# Patient Record
Sex: FEMALE | Race: WHITE | NOT HISPANIC OR LATINO | Employment: FULL TIME | URBAN - METROPOLITAN AREA
[De-identification: names, ages, dates, MRNs, and addresses within clinical notes are randomized per-mention and may not be internally consistent; named-entity substitution may affect disease eponyms.]

---

## 2018-01-11 NOTE — TELEPHONE ENCOUNTER
APPT INFO    Date /Time: 1/24/18 at 2:15PM   Reason for Appt: Wegener's syndrome   Ref Provider/Clinic: Self   Are there internal records? Yes/No?  IF YES, list clinic names: [facility] - returning pt - last seen 2013   Are there outside records? Yes/No? No   Patient Contact (Y/N) & Call Details: No   Action: Chart reviewed

## 2018-01-24 ENCOUNTER — PRE VISIT (OUTPATIENT)
Dept: OTOLARYNGOLOGY | Facility: CLINIC | Age: 68
End: 2018-01-24

## 2018-01-24 ENCOUNTER — OFFICE VISIT (OUTPATIENT)
Dept: OTOLARYNGOLOGY | Facility: CLINIC | Age: 68
End: 2018-01-24
Payer: COMMERCIAL

## 2018-01-24 VITALS — BODY MASS INDEX: 33.79 KG/M2 | HEIGHT: 61 IN | WEIGHT: 179 LBS

## 2018-01-24 DIAGNOSIS — M31.30 GRANULOMATOSIS WITH POLYANGIITIS (H): Primary | ICD-10-CM

## 2018-01-24 ASSESSMENT — PAIN SCALES - GENERAL: PAINLEVEL: NO PAIN (0)

## 2018-01-24 NOTE — LETTER
2018       RE: Susan Liu  [address] [place] [address]     Dear Colleague,    Thank you for referring your patient, Susan Liu, to the [facility] EAR NOSE AND THROAT at [facility]. Please see a copy of my visit note below.    Otolaryngology Adult Consultation    Patient: Susan Liu   : 1950     Patient presents with:  Consult:   Chief Complaint   Patient presents with     Consult     Perla's Syndrome         Referring Provider: Referred Self   Consulting Physician:  Beti Bhardwaj MD       Assessment/Plan: Vidoe recording obtained, will facilitate scheduling a visit and procedure with Dr. Jordan.     HPI: Susan Liu is a 67 year old female seen today in the Otolaryngology Clinic for a history of Wegener's granulomatosis.  She is s/p previous dilations with steroid injections, last was .  She is now noting progressive SOB, feels she is ready for another procedure.  She has previously seen Dr. Jordan - so we discussed today that we would prepare to have her meet with Dr. Jordan for repeat procedure.    Current Outpatient Prescriptions on File Prior to Visit:  dextromethorphan-guaiFENesin (MUCINEX DM)  MG per 12 hr tablet Take 1 tablet by mouth as needed    calcium carbonate-vitamin D 600-200 MG-UNIT TABS Take by mouth every morning    cetirizine (ZYRTEC) 10 MG tablet Take 10 mg by mouth At Bedtime    folic acid (FOLVITE) 1 MG tablet Take 1 mg by mouth every morning    ipratropium - albuterol 0.5 mg/2.5 mg/3 mL (DUONEB) 0.5-2.5 (3) MG/3ML nebulization Take 1 ampule by nebulization every 6 hours as needed   levothyroxine (SYNTHROID, LEVOTHROID) 50 MCG tablet Take 50 mcg by mouth every morning    methotrexate 2.5 MG tablet Take 15 mg by mouth once a week ON FRIDAY   Multiple Vitamin (DAILY MULTIVITAMIN PO) Take by mouth daily (with lunch)    omeprazole 20 MG tablet Take 40 mg by mouth daily (with lunch)    predniSONE (DELTASONE) 20 MG tablet  Take 2.5 mg by mouth every morning    sulfamethoxazole-trimethoprim (BACTRIM) 400-80 MG per tablet Take 1 tablet by mouth every morning      No current facility-administered medications on file prior to visit.        Allergies   Allergen Reactions     Penicillins Anaphylaxis     Codeine Nausea and Vomiting     Codeine Camsylate Nausea and Vomiting     Tramadol GI Disturbance     Tramadol Hcl        Past Medical History:   Diagnosis Date     Allergic rhinitis      Allergic rhinitis, cause unspecified      Anemia      Anxiety      Balance problem      Bleeding disorder (H)      Chronic sinusitis      Depression      Family history of thyroid problem      Hoarseness      Joint disease      Osteoarthritis      Osteoporosis      Reflux      Sinus problem      Skin disease      Thyroid disease      Tinnitus      Wesley syndrome          Review of Systems  [facility] ROS 1/29/2018   Constitutional Unexplained fatigue   Neurology Dizzy spells, Numbness, Unexplained weakness, Headache   Psychology Frequently feeling depressed or sad, Frequently feeling anxious   Eyes Visual loss   Ears, Nose, Throat Hearing loss, Ringing/noise in ears, Nasal congestion or drainage, Trouble swallowing, Hoarseness, Coughing up blood   Cardiopulmonary Cough, Breathing problems, Wheezing   Gastrointestinal/Genitourinary Heartburn/indigestion   Musculoskeletal Sore or stiff joints, Swollen joints, Swollen legs/feet   Allergy/Immunology Skin changes, Rash   Hematologic Easy bruising   Other Rash        14 point ROS neg other than the symptoms noted above.    Physical Exam:    General assessment:  Head and neck exam grossly normal, she is conversant, speaking in full sentences, but does have slightly audible inspiratory stridor and mild hoarseness.    Laryngoscopy  performed to examine the upper airway for pathology, functional or anatomic abnormality.  Verbal consent is obtained.  Topical anesthetic/decongestant solution is applied per patient  request.  The flexible endoscope was passed into each nasal cavity separately, and advanced to the larynx.  Findings are as follows:   Nasal passages without abnormality.     Nasopharynx:  Clear, no lesions, crusting or inflammation   Base of tongue, vallecula, epiglottis, aryepiglottic folds, false vocal folds without  mucosal lesions, masses or anatomic abnormality.   Glottis with normal movement of vocal folds, normal mucosa and no lesions, subglottic larynx visualized and image stream recording performed.   Pyriform sinuses, post-cricoid area, and posterior pharyngeal wall without lesions         Again, thank you for allowing me to participate in the care of your patient.      Sincerely,    Beti Bhardwaj MD

## 2018-01-24 NOTE — MR AVS SNAPSHOT
After Visit Summary   2018    Susan Liu    MRN: [id number]           Patient Information     Date Of Birth          1950        Visit Information        Provider Department      2018 2:15 PM Beti Bhardwaj MD [facility] Ear Nose and Throat        Today's Diagnoses     Granulomatosis with polyangiitis (H)    -  1      Care Instructions    Plan of care:  Dr Bhardwaj will have Dr Jordan review the video from today and see if she is ok scheduling your surgery and between now and the surgery date, have you come and see Dr Jodran.   Dr Jordan's care coordinator will contact [facility] contact information:  1. To schedule an appointment call [phone], option 1  2. To talk to the Triage RN call [phone], option 3  3. If you need to speak to Deanna or get a message to your doctor on a Friday, call the triage RN  4. DeannaRN: [phone]  5. Surgery scheduling:      Dorinda Borja: [phone]      Coretta Redding: [phone]  6. Fax: [phone]  7. [phone]            Follow-ups after your visit        Your next 10 appointments already scheduled     2018 10:40 AM CST   (Arrive by 10:25 AM)   Return Visit with Marcelina Jordan MD   [facility] Ear Nose and Throat ([facility])    [address] [place] [id number] [phone]           This is a multi-disciplinary care team visit as patients with your type of problem are usually seen by a team of an MD and a Speech-Language Pathologist (who is a specialist in disorders of the voice, throat, and breathing).  Please plan about 2 hours for your visit, which will likely include Laryngeal Function Studies, a Voice/Swallow/Breathing Evaluation, and an Endoscopic Laryngeal Examination to provide a comprehensive evaluation.  Please check with your insurance company to ensure you are covered for these services. - It is important to know that if you are evaluated and/or  treated by both a physician and a speech pathologist during your visit, your billing will reflect the input that you receive from both providers as separate professionals. Although most insurance plans do cover these services, we encourage you to contact your insurance company prior to your visit to determine whether there are any coverage limitations that might affect you financially. - Billing/procedure codes that are frequently associated with visits to our clinic include (but are not limited to) the ones listed below. Most patients will not need all of these items, but it may be useful to ask your insurance company's patient . 79940: Flexible fiberoptic laryngoscopy, 09731: Laryngoscopy; flexible or rigid fiberoptic, with stroboscopy, 62487: Flexible endoscopic evaluation of swallowing, 51158: Laryngeal function aerodynamic evaluation, 27291: Evaluation of Voice and Resonance, 52818: Speech pathology treatment for voice, speech, communication, 17658: Speech pathology treatment for swallowing/oral function for feeding - If you have any concerns or questions, or if you would prefer not to have a speech pathologist involved in your visit, please contact our Clinic Coordinator at [phone], at least 24 hours prior to your appointment.              Who to contact     Please call your clinic at [phone] to:    Ask questions about your health    Make or cancel appointments    Discuss your medicines    Learn about your test results    Speak to your doctor   If you have compliments or concerns about an experience at your clinic, or if you wish to file a complaint, please contact [facility] Patient Relations at [phone] or email us at [email or web address]         Additional Information About Your Visit        [employer] Information     [employer] River is an electronic gateway that provides easy, online access to your medical records. With [employer], you can  "request a clinic appointment, read your test results, renew a prescription or communicate with your care team.     To sign up for [facility] visit the website at [email or web address]   You will be asked to enter the access code listed below, as well as some personal information. Please follow the directions to create your username and password.     Your access code is: [email or web address]  Expires: 4/10/2018  6:30 AM     Your access code will  in 90 days. If you need help or a new code, please contact your [facility] or call [phone] for assistance.        Care EveryWhere ID     This is your Care EveryWhere ID. This could be used by other organizations to access your [facility] medical records  [id number]        Your Vitals Were     Height BMI (Body Mass Index)                1.55 m (5' 1.02\") 33.8 kg/m2           Blood Pressure from Last 3 Encounters:   18 138/89   18 (!) 168/95   13 122/62    Weight from Last 3 Encounters:   18 79.9 kg (176 lb 2.4 oz)   18 80.2 kg (176 lb 12.8 oz)   18 80.3 kg (177 lb)              We Performed the Following     IMAGESTREAM RECORDING ORDER     LARYNGOSCOPY FLEX FIBEROPTIC, DIAGNOSTIC        Primary Care Provider    None Specified       [facility] [address] [facility] [address]        Equal Access to Services     [facility] JESSEE AH: Hadii aad ku hadasho Soomaali, waaxda luqadaha, qaybta kaalmada adeegyada, waxay danielle gary'swati . So [facility] [phone].    ATENCIÓN: Si habla español, tiene a apple disposición servicios gratuitos de asistencia lingüística. Llame al [phone].    We comply with applicable federal civil rights laws and [place] laws. We do not discriminate on the basis of race, color, national origin, age, disability, sex, sexual orientation, or gender identity.            Thank you!     Thank you for choosing [facility] EAR NOSE AND THROAT  for your care. Our " goal is always to provide you with excellent care. Hearing back from our patients is one way we can continue to improve our services. Please take a few minutes to complete the written survey that you may receive in the mail after your visit with us. Thank you!             Your Updated Medication List - Protect others around you: Learn how to safely use, store and throw away your medicines at www.disposemymeds.org.          This list is accurate as of 1/24/18 11:59 PM.  Always use your most recent med list.                   Brand Name Dispense Instructions for use Diagnosis    BACTRIM 400-80 MG per tablet   Generic drug:  sulfamethoxazole-trimethoprim      Take 1 tablet by mouth every morning        calcium carbonate-vitamin D 600-200 MG-UNIT Tabs      Take by mouth every morning        cetirizine 10 MG tablet    zyrTEC     Take 10 mg by mouth At Bedtime        DAILY MULTIVITAMIN PO      Take by mouth daily (with lunch)        dextromethorphan-guaiFENesin  MG per 12 hr tablet    MUCINEX DM     Take 1 tablet by mouth as needed        DUONEB 0.5-2.5 (3) MG/3ML neb solution   Generic drug:  ipratropium - albuterol 0.5 mg/2.5 mg/3 mL      Take 1 ampule by nebulization every 6 hours as needed        folic acid 1 MG tablet    FOLVITE     Take 1 mg by mouth every morning        levothyroxine 50 MCG tablet    SYNTHROID/LEVOTHROID     Take 50 mcg by mouth every morning        methotrexate 2.5 MG tablet CHEMO      Take 15 mg by mouth once a week ON FRIDAY        omeprazole 20 MG tablet      Take 40 mg by mouth daily (with lunch)        predniSONE 20 MG tablet    DELTASONE     Take 2.5 mg by mouth every morning        TRAZODONE HCL PO      Take 50 mg by mouth At Bedtime

## 2018-01-24 NOTE — NURSING NOTE
Chief Complaint   Patient presents with     Consult     Perla's Syndrome        Yudith Poe Medical Assistant

## 2018-01-25 ENCOUNTER — TELEPHONE (OUTPATIENT)
Dept: OTOLARYNGOLOGY | Facility: CLINIC | Age: 68
End: 2018-01-25

## 2018-01-25 NOTE — TELEPHONE ENCOUNTER
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  RN left triage number for follow up.    Suzy KRISHNAMURTHYN, RN  [phone]  [facility]   Head & Neck Surgery   1/25/2018 9:24 AM

## 2018-01-29 ENCOUNTER — OFFICE VISIT (OUTPATIENT)
Dept: OTOLARYNGOLOGY | Facility: CLINIC | Age: 68
End: 2018-01-29
Payer: COMMERCIAL

## 2018-01-29 VITALS — WEIGHT: 177 LBS | BODY MASS INDEX: 33.42 KG/M2 | HEIGHT: 61 IN

## 2018-01-29 DIAGNOSIS — J38.6 SUBGLOTTIC STENOSIS: Primary | ICD-10-CM

## 2018-01-29 DIAGNOSIS — M31.30 WEGENER'S GRANULOMATOSIS: ICD-10-CM

## 2018-01-29 ASSESSMENT — PAIN SCALES - GENERAL: PAINLEVEL: NO PAIN (0)

## 2018-01-29 NOTE — PROGRESS NOTES
Dear Colleague:    I had the pleasure of meeting Susan Liu in consultation at the [facility] of the [facility] Otolaryngology Clinic on a self-referred basis, for evaluation of breathing problems. The note from our visit follows.  I appreciate the opportunity to participate in the care of this pleasant patient.    Please feel free to contact me with any questions.    Sincerely yours,    Marcelina Jordan M.D., M.P.H.  , Laryngology  Director, [facility]  Otolaryngology- Head & Neck Surgery  [phone]          =====    History of Present Illness:   Susan Liu is a pleasant 67-year-old female with a medical history including atrial fibrillation, Wegeners, and subglottic stenosis who presents with a three month history of breathing problems. She underwent a Microdirect laryngoscopy with incision, dilation, steroid injection of subglottic stenosis in 2013 and did well for years.       Voice  Her voice has been stable since she was last seen.      Swallowing  She feels like her swallowing has gotten a little weaker over time. She describes herself as a slow eater all her life. Things get down the wrong tube occasionally, mostly if she is trying to rush. She does experience mildly increased swallowing effort with all textures.      Cough/Throat-clearing  She notes that she often has a cough and it is typically exacerbated by spring/fall environmental allergens.      Breathing  Her daughter notes that the patient was wheezy in November, and then her breathing got a little better when it got cold. She is able to walk about 50 feet before she needs to stop to rest. She also has joint pain in her lower extremities which compounds her difficulty.      Throat discomfort  No concerns.       Reflux-type symptoms  She experiences heartburn/indigestion daily. She is taking reflux medications.      Prior EPIC records were reviewed for this visit.  She continues to follow with her rheumatologist Dr. Abilio Greene.      MEDICATIONS:     Current Outpatient Prescriptions   Medication Sig Dispense Refill     TRAZODONE HCL PO        HYDROcodone-acetaminophen 5-325 MG per tablet Take 1-2 tablets by mouth every 4 hours as needed for other (Moderate to Severe Pain) 20 tablet 0     dextromethorphan-guaiFENesin (MUCINEX DM)  MG per 12 hr tablet Take 1 tablet by mouth every 12 hours       albuterol (2.5 MG/3ML) 0.083% nebulizer solution Take 1 ampule by nebulization every 6 hours as needed       calcium carbonate-vitamin D 600-200 MG-UNIT TABS Take by mouth daily       cetirizine (ZYRTEC) 10 MG tablet Take 10 mg by mouth daily       folic acid (FOLVITE) 1 MG tablet Take 1 mg by mouth daily       ipratropium - albuterol 0.5 mg/2.5 mg/3 mL (DUONEB) 0.5-2.5 (3) MG/3ML nebulization Take 1 ampule by nebulization every 6 hours as needed       levothyroxine (SYNTHROID, LEVOTHROID) 50 MCG tablet Take by mouth daily       meloxicam (MOBIC) 15 MG tablet Take 15 mg by mouth daily       methotrexate 2.5 MG tablet Take by mouth once a week       Multiple Vitamin (DAILY MULTIVITAMIN PO) Take by mouth daily       omeprazole 20 MG tablet Take 40 mg by mouth daily        predniSONE (DELTASONE) 20 MG tablet Take by mouth daily       sulfamethoxazole-trimethoprim (BACTRIM) 400-80 MG per tablet Take 1 tablet by mouth 2 times daily         ALLERGIES:    Allergies   Allergen Reactions     Penicillins Anaphylaxis     Codeine Nausea and Vomiting     Codeine Camsylate Nausea and Vomiting     Tramadol GI Disturbance     Tramadol Hcl        PAST MEDICAL HISTORY:   Past Medical History:   Diagnosis Date     Allergic rhinitis      Allergic rhinitis, cause unspecified      Anemia      Anxiety      Atrial fibrillation (H)      Balance problem      Bleeding disorder (H)      Chronic sinusitis      Depression      Family history of thyroid problem      Hoarseness      Joint disease       Osteoarthritis      Osteoporosis      Pneumonia      Reflux      Sinus problem      Skin disease      Thyroid disease      Tinnitus         PAST SURGICAL HISTORY:   Past Surgical History:   Procedure Laterality Date     BUNIONECTOMY      both feet      SECTION      three     dilation for subglotic stenosis[       INJECT STEROID (LOCATION)  2013    Procedure: INJECT STEROID (LOCATION);;  Surgeon: Marcelina Jordan MD;  Location: [facility] OR     LASER CO2 LARYNGOSCOPY, COMPLEX  2013    Procedure: LASER CO2 LARYNGOSCOPY, COMPLEX;  Co2 Lumenis Laser Micro Direct Laryngoscopy, Incision and Dilation Subglottic stenosis, Steroid Injection, Mitomycin application. ;  Surgeon: Marcelina Jordan MD;  Location: [facility] OR       HABITS/SOCIAL HISTORY:    Social History   Substance Use Topics     Smoking status: Never Smoker     Smokeless tobacco: Never Used     Alcohol use Yes      Comment: 2 glasses wine a day         FAMILY HISTORY:    Family History   Problem Relation Age of Onset     Hypertension Mother      Dementia Mother      Depression Mother      Thyroid Disease Mother      Migraines Mother      Hypertension Father      Alcohol/Drug Father      Depression Father      HEART DISEASE Brother      CANCER Brother      Thyroid Disease Daughter      Migraines Daughter      2 daughters       REVIEW OF SYSTEMS:  The patient completed a comprehensive 11 point review of systems (below), which was reviewed. Positives are as noted below; pertinent findings are as noted in the HPI.     Patient Supplied Answers to Review of Systems   ENT ROS 2018   Constitutional Unexplained fatigue   Neurology Dizzy spells, Numbness, Unexplained weakness, Headache   Psychology Frequently feeling depressed or sad, Frequently feeling anxious   Eyes Visual loss   Ears, Nose, Throat Hearing loss, Ringing/noise in ears, Nasal congestion or drainage, Trouble swallowing, Hoarseness, Coughing up blood   Cardiopulmonary Cough,  Breathing problems, Wheezing   Gastrointestinal/Genitourinary Heartburn/indigestion   Musculoskeletal Sore or stiff joints, Swollen joints, Swollen legs/feet   Allergy/Immunology Skin changes, Rash   Hematologic Easy bruising   Other Rash       PHYSICAL EXAMINATION:  General: The patient was alert and conversant, and in no acute distress. Patient accompanied by her daughter.  Eyes: PERRL, conjunctiva and lids normal, sclera nonicteric.  Nose: Anterior rhinoscopy: no gross abnormalities. no  bleeding; no  mucopurulence; septum grossly normal, moderate  mucoid drainage and/or crusting.  Oral cavity/oropharynx: No masses or lesions. Edentulous. Tongue mobility and palate elevation intact and symmetric.  Ears: Normal auricles, external auditory canals bilaterally. Visualized portions of tympanic membranes normal bilaterally. Moderate cerumen noted bilaterally.  Neck: No palpable cervical lymphadenopathy. There was moderate  tenderness to palpation of the thyrohyoid space, which was narrow. No obvious thyroid abnormality. Landmarks palpable.  Resp: Breathing comfortably at rest; audible inhalation.  Neuro: Symmetric facial function. Other cranial nerves as documented above.  Psych: Normal affect, pleasant and cooperative.  Voice/speech: Mild to moderate dysphonia characterized by breathiness, roughness and strain.  Extremities: No cyanosis, clubbing, or edema of the upper extremities.    Intake scores  Total Score for Last Patient-Answered VHI Questionnaire  VHI Total Score 1/29/2018   VHI Total Score 18     Total Score for Last Patient-Answered EAT Questionnaire  EAT Total Score 1/29/2018   EAT Total Score 9     Total Score for Last Patient-Answered CSI Questionnaire  CSI Total Score 1/29/2018   CSI Total Score 18       PROCEDURE:   Flexible fiberoptic laryngoscopy  Indications: This procedure was warranted to evaluate the patient's laryngeal anatomy and function. Risks, benefits, and alternatives were  discussed.  Description: After written informed consent was obtained, a time-out was performed to confirm patient identity, procedure, and procedure site. Topical 3% lidocaine with 0.25% phenylephrine was applied to the nasal cavities. I performed the endoscopy and no complications were apparent.  Performed by: Marcelina Jordan MD MPH  SLP: NA  Findings: Normal nasopharynx. Normal base of tongue, valleculae, and epiglottis. The right true vocal fold demonstrated normal mobility. The left true vocal fold demonstrated normal mobility. The medial edges of the vocal folds appeared smooth and straight. No focal mucosal lesions were observed on the true vocal folds. Glissade produced appropriate elongation. Mucosa of the false vocal folds, aryepiglottic folds, piriform sinuses, and posterior glottis unremarkable. Subglottic airway with visible inflammation beginning just below true vocal folds, moderately narrowed. Normal tracheal rings partially visible distal to stenotic region.           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     We discussed potential means of managing airway stenosis, including open resection, and endoscopic incision and dilation. She was more interested in the endoscopic approach, having undergone it previously with both Dr. Penny and myself. Risks of surgery, including injury to lips/ teeth/ tongue/ jaw/ throat/ airway/lungs, risks of general anesthesia, and temporary/permanent hoarseness were discussed. This included temporary or permanent dysgeusia, hypesthesia, or motion abnormalities of the tongue. We also discussed a potential need for additional procedures in the future, particularly if the stenosis recurs, which is relatively common. We discussed that tracheotomy is rarely necessary, and the airway would be managed with jet ventilation, which does carry a small risk of airway/lung injury. She was comfortable with  steroid injection into the stenosis. She would also like to receive mitomycin again. We discussed that the procedure will be outpatient with a low threshold for overnight observation if there are any concerns, since it is an airway procedure.    After hearing the risks and benefits, she indicated that she would like to proceed with endoscopic surgery. We will get this scheduled over the next few weeks. I appreciate the opportunity to participate in the care of this patient.

## 2018-01-29 NOTE — LETTER
1/29/2018      RE: Susan Liu  [address] [place] [address]       Dear Colleague:    I had the pleasure of meeting Susan Liu in consultation at the [facility] of the [facility] on a self-referred basis, for evaluation of breathing problems. The note from our visit follows.  I appreciate the opportunity to participate in the care of this pleasant patient.    Please feel free to contact me with any questions.    Sincerely yours,    Marcelina Jordan M.D., M.P.H.  , Laryngology  Director, [facility]  Otolaryngology- Head & Neck Surgery  [phone]          =====    History of Present Illness:   Susan Liu is a pleasant 67 year old female with a medical history including atrial fibrillation, Wegeners, and subglottic stenosis who presents with a 3 month history of breathing problems. She underwent a Microdirect laryngoscopy with incision, dilation, steroid injection of subglottic stenosis in 2013 and did well for years.     Voice  Her voice has been stable since she was last seen.    Swallowing  She feels like her swallowing has gotten a little weaker over time; she describes herself as a slow eater all her life. Things get down the wrong tube occasionally, mostly if she is trying to rush. She does experience mildly increased swallowing effort with all textures.    Cough/Throat-clearing  She notes that she often has a cough and it is typically exacerbated by spring/fall environmental allergens.    Breathing  Her daughter notes that the patient was wheezy in November, and then her breathing got a little better when it got cold. She is able to go about 50 ft before she needs to stop to rest; she also has joint pain in her lower extremities which compounds her difficulty.    Throat discomfort  No concerns.     Reflux-type symptoms  She experiences heartburn/indigestion daily. She is taking reflux  medications.    Prior [facility] records were reviewed for this visit. She continues to follow with her rheumatologist Dr. Abilio Greene.      MEDICATIONS:     Current Outpatient Prescriptions   Medication Sig Dispense Refill     TRAZODONE HCL PO        HYDROcodone-acetaminophen 5-325 MG per tablet Take 1-2 tablets by mouth every 4 hours as needed for other (Moderate to Severe Pain) 20 tablet 0     dextromethorphan-guaiFENesin (MUCINEX DM)  MG per 12 hr tablet Take 1 tablet by mouth every 12 hours       albuterol (2.5 MG/3ML) 0.083% nebulizer solution Take 1 ampule by nebulization every 6 hours as needed       calcium carbonate-vitamin D 600-200 MG-UNIT TABS Take by mouth daily       cetirizine (ZYRTEC) 10 MG tablet Take 10 mg by mouth daily       folic acid (FOLVITE) 1 MG tablet Take 1 mg by mouth daily       ipratropium - albuterol 0.5 mg/2.5 mg/3 mL (DUONEB) 0.5-2.5 (3) MG/3ML nebulization Take 1 ampule by nebulization every 6 hours as needed       levothyroxine (SYNTHROID, LEVOTHROID) 50 MCG tablet Take by mouth daily       meloxicam (MOBIC) 15 MG tablet Take 15 mg by mouth daily       methotrexate 2.5 MG tablet Take by mouth once a week       Multiple Vitamin (DAILY MULTIVITAMIN PO) Take by mouth daily       omeprazole 20 MG tablet Take 40 mg by mouth daily        predniSONE (DELTASONE) 20 MG tablet Take by mouth daily       sulfamethoxazole-trimethoprim (BACTRIM) 400-80 MG per tablet Take 1 tablet by mouth 2 times daily         ALLERGIES:    Allergies   Allergen Reactions     Penicillins Anaphylaxis     Codeine Nausea and Vomiting     Codeine Camsylate Nausea and Vomiting     Tramadol GI Disturbance     Tramadol Hcl        PAST MEDICAL HISTORY:   Past Medical History:   Diagnosis Date     Allergic rhinitis      Allergic rhinitis, cause unspecified      Anemia      Anxiety      Atrial fibrillation (H)      Balance problem      Bleeding disorder (H)      Chronic sinusitis      Depression      Family history of  thyroid problem      Hoarseness      Joint disease      Osteoarthritis      Osteoporosis      Pneumonia      Reflux      Sinus problem      Skin disease      Thyroid disease      Tinnitus         PAST SURGICAL HISTORY:   Past Surgical History:   Procedure Laterality Date     BUNIONECTOMY      both feet      SECTION      three     dilation for subglotic stenosis[       INJECT STEROID (LOCATION)  2013    Procedure: INJECT STEROID (LOCATION);;  Surgeon: Marcelina Jordan MD;  Location: [facility] OR     LASER CO2 LARYNGOSCOPY, COMPLEX  2013    Procedure: LASER CO2 LARYNGOSCOPY, COMPLEX;  Co2 Lumenis Laser Micro Direct Laryngoscopy, Incision and Dilation Subglottic stenosis, Steroid Injection, Mitomycin application. ;  Surgeon: Marcelina Jordan MD;  Location: [facility] OR       HABITS/SOCIAL HISTORY:    Social History   Substance Use Topics     Smoking status: Never Smoker     Smokeless tobacco: Never Used     Alcohol use Yes      Comment: 2 glasses wine a day         FAMILY HISTORY:    Family History   Problem Relation Age of Onset     Hypertension Mother      Dementia Mother      Depression Mother      Thyroid Disease Mother      Migraines Mother      Hypertension Father      Alcohol/Drug Father      Depression Father      HEART DISEASE Brother      CANCER Brother      Thyroid Disease Daughter      Migraines Daughter      2 daughters       REVIEW OF SYSTEMS:  The patient completed a comprehensive 11 point review of systems (below), which was reviewed. Positives are as noted below; pertinent findings are as noted in the HPI.     Patient Supplied Answers to Review of Systems  [facility] ENT ROS 2018   Constitutional Unexplained fatigue   Neurology Dizzy spells, Numbness, Unexplained weakness, Headache   Psychology Frequently feeling depressed or sad, Frequently feeling anxious   Eyes Visual loss   Ears, Nose, Throat Hearing loss, Ringing/noise in ears, Nasal congestion or drainage, Trouble swallowing,  Hoarseness, Coughing up blood   Cardiopulmonary Cough, Breathing problems, Wheezing   Gastrointestinal/Genitourinary Heartburn/indigestion   Musculoskeletal Sore or stiff joints, Swollen joints, Swollen legs/feet   Allergy/Immunology Skin changes, Rash   Hematologic Easy bruising   Other Rash       PHYSICAL EXAMINATION:  General: The patient was alert and conversant, and in no acute distress. Patient accompanied by her daughter.  Eyes: PERRL, conjunctiva and lids normal, sclera nonicteric.  Nose: Anterior rhinoscopy: no gross abnormalities. no  bleeding; no  mucopurulence; septum grossly normal, moderate  mucoid drainage and/or crusting.  Oral cavity/oropharynx: No masses or lesions. Edentulous. Tongue mobility and palate elevation intact and symmetric.  Ears: Normal auricles, external auditory canals bilaterally. Visualized portions of tympanic membranes normal bilaterally. Moderate cerumen noted bilaterally.  Neck: No palpable cervical lymphadenopathy. There was moderate  tenderness to palpation of the thyrohyoid space, which was narrow. No obvious thyroid abnormality. Landmarks palpable.  Resp: Breathing comfortably at rest; audible inhalation.  Neuro: Symmetric facial function. Other cranial nerves as documented above.  Psych: Normal affect, pleasant and cooperative.  Voice/speech: Mild to moderate dysphonia characterized by breathiness, roughness and strain.  Extremities: No cyanosis, clubbing, or edema of the upper extremities.    Intake scores  Total Score for Last Patient-Answered VHI Questionnaire  VHI Total Score 1/29/2018   VHI Total Score 18     Total Score for Last Patient-Answered EAT Questionnaire  EAT Total Score 1/29/2018   EAT Total Score 9     Total Score for Last Patient-Answered CSI Questionnaire  CSI Total Score 1/29/2018   CSI Total Score 18       PROCEDURE:   Flexible fiberoptic laryngoscopy  Indications: This procedure was warranted to evaluate the patient's laryngeal anatomy and function.  Risks, benefits, and alternatives were discussed.  Description: After written informed consent was obtained, a time-out was performed to confirm patient identity, procedure, and procedure site. Topical 3% lidocaine with 0.25% phenylephrine was applied to the nasal cavities. I performed the endoscopy and no complications were apparent.  Performed by: Marcelina Jordan MD MPH  SLP: NA  Findings: Normal nasopharynx. Normal base of tongue, valleculae, and epiglottis. The right true vocal fold demonstrated normal mobility. The left true vocal fold demonstrated normal mobility. The medial edges of the vocal folds appeared smooth and straight. No focal mucosal lesions were observed on the true vocal folds. Glissade produced appropriate elongation. Mucosa of the false vocal folds, aryepiglottic folds, piriform sinuses, and posterior glottis unremarkable. Subglottic airway with visible inflammation beginning just below true vocal folds, moderately narrowed. Normal tracheal rings partially visible distal to stenotic region.           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 We discussed potential means of managing airway stenosis, including open resection, and endoscopic incision and dilation. She was more interested in the endoscopic approach, having undergone it previously with both Dr Penny and myself. Risks of surgery, including injury to lips/ teeth/ tongue/ jaw/ throat/ airway/lungs, risks of general anesthesia, and temporary/permanent hoarseness were discussed. This included temporary or permanent dysgeusia, hypesthesia, or motion abnormalities of the tongue. We also discussed a potential need for additional procedures in the future, particularly if the stenosis recurs, which is relatively common. We discussed that tracheotomy is rarely necessary, and the airway would be managed with jet ventilation, which does carry a small risk of airway/lung  injury. She was comfortable with steroid injection into the stenosis. She would also like to receive mitomycin again. We discussed that the procedure will be outpatient with a low threshold for overnight observation if there are any concerns, since it is an airway procedure.    After hearing the risks and benefits, she indicated that she would like to proceed with endoscopic surgery. We will get this scheduled over the next few weeks. I appreciate the opportunity to participate in the care of this patient.             Marcelina Jordan MD

## 2018-01-29 NOTE — NURSING NOTE
"Chief Complaint   Patient presents with     RECHECK     Follow up for subglotic stenosis      Height 1.549 m (5' 1\"), weight 80.3 kg (177 lb).    Nelson Abreu    "

## 2018-01-29 NOTE — MR AVS SNAPSHOT
After Visit Summary   1/29/2018    Susan Liu    MRN: [id number]           Patient Information     Date Of Birth          1950        Visit Information        Provider Department      1/29/2018 10:40 AM Marcelina Jordan MD [facility] Ear Nose and Throat        Today's Diagnoses     Subglottic stenosis    -  1    Wegener's granulomatosis (H)          Care Instructions    1.  You were seen in the ENT Clinic today by Dr. Jordan.  If you have any questions or concerns after your appointment, please call [phone].  Press option #1 for scheduling related needs.  Press option #3 for Nurse advice.  2.  Plan is to schedule surgery for dilation.      Suzy POLK, RN  [facility] ENT   Head & Neck Surgery               Follow-ups after your visit        Additional Services     PAC Visit Referral (For [facility] Only)       Does this visit require an Anesthesia consult?  Yes - Evaluate for medical necessity related to one of the following conditions:  Hx Wegener's granulomatosis     H&P done by:  PAC      Please be aware that coverage of these services is subject to the terms and limitations of your health insurance plan.  Call member services at your health plan with any benefit or coverage questions.      Please bring the following to your appointment:  >>   Any x-rays, CTs or MRIs which have been performed.  Contact the facility where they were done to arrange for  prior to your scheduled appointment.  Any new CT, MRI or other procedures ordered by your specialist must be performed at a [place] facility or coordinated by your clinic's referral office.    >>   List of current medications  >>   This referral request   >>   Any documents/labs given to you for this referral                  Your next 10 appointments already scheduled     Feb 01, 2018   Procedure with Marcelina Jordan MD   [facility], [place], Same Day Surgery (--)    [address] [address]    [phone]            Feb 26, 2018 10:40 AM CST   (Arrive by 10:25 AM)   Return Visit with Marcelina Jordan MD   [facility] Ear Nose and Throat ([facility] [place])    [address] [id number] [phone]           This is a multi-disciplinary care team visit as patients with your type of problem are usually seen by a team of an MD and a Speech-Language Pathologist (who is a specialist in disorders of the voice, throat, and breathing).  Please plan about 2 hours for your visit, which will likely include Laryngeal Function Studies, a Voice/Swallow/Breathing Evaluation, and an Endoscopic Laryngeal Examination to provide a comprehensive evaluation.  Please check with your insurance company to ensure you are covered for these services. - It is important to know that if you are evaluated and/or treated by both a physician and a speech pathologist during your visit, your billing will reflect the input that you receive from both providers as separate professionals. Although most insurance plans do cover these services, we encourage you to contact your insurance company prior to your visit to determine whether there are any coverage limitations that might affect you financially. - Billing/procedure codes that are frequently associated with visits to our clinic include (but are not limited to) the ones listed below. Most patients will not need all of these items, but it may be useful to ask your insurance company's patient . 04762: Flexible fiberoptic laryngoscopy, 30400: Laryngoscopy; flexible or rigid fiberoptic, with stroboscopy, 00459: Flexible endoscopic evaluation of swallowing, 03067: Laryngeal function aerodynamic evaluation, 12144: Evaluation of Voice and Resonance, 47476: Speech pathology treatment for voice, speech, communication, 88590: Speech pathology treatment for swallowing/oral function for feeding - If you have any  "concerns or questions, or if you would prefer not to have a speech pathologist involved in your visit, please contact our Clinic Coordinator at [phone], at least 24 hours prior to your appointment.              Who to contact     Please call your clinic at [phone] to:    Ask questions about your health    Make or cancel appointments    Discuss your medicines    Learn about your test results    Speak to your doctor   If you have compliments or concerns about an experience at your clinic, or if you wish to file a complaint, please contact [facility] Patient Relations at [phone] or email us at [email or web address]         Additional Information About Your Visit        [employer] Information     MTPV is an electronic gateway that provides easy, online access to your medical records. With MTPV, you can request a clinic appointment, read your test results, renew a prescription or communicate with your care team.     To sign up for MTPV visit the website at [email or web address]   You will be asked to enter the access code listed below, as well as some personal information. Please follow the directions to create your username and password.     Your access code is: [email or web address]  Expires: 4/10/2018  6:30 AM     Your access code will  in 90 days. If you need help or a new code, please contact your [facility] Clinic or call [phone] for assistance.        Care EveryWhere ID     This is your Care EveryWhere ID. This could be used by other organizations to access your [place] medical records  [id number]        Your Vitals Were     Height BMI (Body Mass Index)                1.549 m (5' 1\") 33.44 kg/m2           Blood Pressure from Last 3 Encounters:   18 (!) 168/95   13 122/62    Weight from Last 3 Encounters:   18 80.2 kg (176 lb 12.8 oz)   18 80.3 kg (177 lb)   18 81.2 kg (179 lb)            "   We Performed the Following     IMAGESTREAM RECORDING ORDER     LARYNGOSCOPY FLEX FIBEROPTIC, DIAGNOSTIC     PAC Visit Referral (For [facility] Only)     Molly-Operative Worksheet        Primary Care Provider Office Phone # Fax #    Dea Tolbert [phone]       [facility] [address]  [place] [address]        Equal Access to Services     [facility] : Hadii aad ku hadasho Soomaali, waaxda luqadaha, qaybta kaalmada adeegyada, waxay idiin hayaan adeeg kharash la'aan ah. So [facility] [phone].    ATENCIÓN: Si habla español, tiene a apple disposición servicios gratuitos de asistencia lingüística. Pierce al [phone].    We comply with applicable federal civil rights laws and [place] laws. We do not discriminate on the basis of race, color, national origin, age, disability, sex, sexual orientation, or gender identity.            Thank you!     Thank you for choosing [facility] EAR NOSE AND THROAT  for your care. Our goal is always to provide you with excellent care. Hearing back from our patients is one way we can continue to improve our services. Please take a few minutes to complete the written survey that you may receive in the mail after your visit with us. Thank you!             Your Updated Medication List - Protect others around you: Learn how to safely use, store and throw away your medicines at www.disposemymeds.org.          This list is accurate as of 1/29/18 11:59 PM.  Always use your most recent med list.                   Brand Name Dispense Instructions for use Diagnosis    BACTRIM 400-80 MG per tablet   Generic drug:  sulfamethoxazole-trimethoprim      Take 1 tablet by mouth every morning        calcium carbonate-vitamin D 600-200 MG-UNIT Tabs      Take by mouth every morning        cetirizine 10 MG tablet    zyrTEC     Take 10 mg by mouth At Bedtime        DAILY MULTIVITAMIN PO      Take by mouth daily (with lunch)        dextromethorphan-guaiFENesin  MG per 12 hr  tablet    MUCINEX DM     Take 1 tablet by mouth as needed        DUONEB 0.5-2.5 (3) MG/3ML neb solution   Generic drug:  ipratropium - albuterol 0.5 mg/2.5 mg/3 mL      Take 1 ampule by nebulization every 6 hours as needed        folic acid 1 MG tablet    FOLVITE     Take 1 mg by mouth every morning        levothyroxine 50 MCG tablet    SYNTHROID/LEVOTHROID     Take 50 mcg by mouth every morning        methotrexate 2.5 MG tablet CHEMO      Take 15 mg by mouth once a week ON FRIDAY        omeprazole 20 MG tablet      Take 40 mg by mouth daily (with lunch)        predniSONE 20 MG tablet    DELTASONE     Take 2.5 mg by mouth every morning        TRAZODONE HCL PO      Take 50 mg by mouth At Bedtime

## 2018-01-29 NOTE — PATIENT INSTRUCTIONS
1.  You were seen in the ENT Clinic today by Dr. Jordan.  If you have any questions or concerns after your appointment, please call [phone].  Press option #1 for scheduling related needs.  Press option #3 for Nurse advice.  2.  Plan is to schedule surgery for dilation.      Suzy KRISHNAMURTHYN, RN  [facility] ENT   Head & Neck Surgery

## 2018-01-30 ENCOUNTER — ANESTHESIA EVENT (OUTPATIENT)
Dept: SURGERY | Facility: CLINIC | Age: 68
End: 2018-01-30
Payer: MEDICARE

## 2018-01-31 ENCOUNTER — ALLIED HEALTH/NURSE VISIT (OUTPATIENT)
Dept: SURGERY | Facility: CLINIC | Age: 68
End: 2018-01-31
Payer: COMMERCIAL

## 2018-01-31 ENCOUNTER — APPOINTMENT (OUTPATIENT)
Dept: SURGERY | Facility: CLINIC | Age: 68
End: 2018-01-31
Payer: COMMERCIAL

## 2018-01-31 ENCOUNTER — OFFICE VISIT (OUTPATIENT)
Dept: SURGERY | Facility: CLINIC | Age: 68
End: 2018-01-31
Payer: COMMERCIAL

## 2018-01-31 VITALS
HEART RATE: 91 BPM | SYSTOLIC BLOOD PRESSURE: 168 MMHG | TEMPERATURE: 98.2 F | HEIGHT: 61 IN | WEIGHT: 176.8 LBS | RESPIRATION RATE: 18 BRPM | OXYGEN SATURATION: 98 % | BODY MASS INDEX: 33.38 KG/M2 | DIASTOLIC BLOOD PRESSURE: 95 MMHG

## 2018-01-31 DIAGNOSIS — J38.6 SUBGLOTTIC STENOSIS: ICD-10-CM

## 2018-01-31 DIAGNOSIS — Z01.818 PRE-OP EXAM: Primary | ICD-10-CM

## 2018-01-31 RX ORDER — PHENOL 1.4 %
10 AEROSOL, SPRAY (ML) MUCOUS MEMBRANE AT BEDTIME
COMMUNITY

## 2018-01-31 RX ORDER — CALCIUM CARBONATE 300MG(750)
400 TABLET,CHEWABLE ORAL AT BEDTIME
COMMUNITY

## 2018-01-31 ASSESSMENT — ENCOUNTER SYMPTOMS: DYSRHYTHMIAS: 1

## 2018-01-31 NOTE — PATIENT INSTRUCTIONS
Preparing for Your Surgery      Name:  Susan Liu   MRN:  [id number]   :  1950   Today's Date:  2018     Arriving for surgery:  Surgery date:  18  Arrival time:  09:10 a.m.  Please come to:       [facility] [address]  [address] [place]  [address]    -   parking is available in front of the hospital from 5:15 am to 8:00 pm    -  Stop at the Information Desk in the lobby    -   Inform the information person that you are here for surgery. An escort to 3c will be provided. If you would not like an escort, please proceed to 3C on the 3rd floor. [phone]     What can I eat or drink?  -  You may have solid food or milk products until 8 hours prior to your surgery  midnight  -  You may have water, apple juice or 7up/Sprite until 2 hours prior to your surgery  09:10 a.m.    Which medicines can I take?  -  Do NOT take these medications in the morning, the day of surgery:     Calcium, folic acid, Meloxicam, methotrexate, multivitamin, evening primrose, magnesium    -  Please take these medications the day of surgery:      Mucinex as needed, Zyrtec, Omeprazole, prednisone, bactrim, synthroid  AFTER YOUR SURGERY  Breathing exercises   Breathing exercises help you recover faster. Take deep breaths and let the air out slowly. This will:     Help you wake up after surgery.    Help prevent complications like pneumonia.  Preventing complications will help you go home sooner.   We may give you a breathing device (incentive spirometer) to encourage you to breathe deeply.   Nausea and vomiting   You may feel sick to your stomach after surgery; if so, let your nurse know.    Pain control:  After surgery, you may have pain. Our goal is to help you manage your pain. Pain medicine will help you feel comfortable enough to do activities that will help you heal.  These activities may include breathing exercises, walking and physical therapy.   To help your health care  team treat your pain we will ask: 1) If you have pain  2) where it is located 3) describe your pain in your words  Methods of pain control include medications given by mouth, vein or by nerve block for some surgeries.  We may give you a pain control pump that will:  1) Deliver the medicine through a tube placed in your vein  2) Control the amount of medicine you receive  3) Allow you to push a button to deliver a dose of pain medicine  Sequential Compression Device (SCD) or Pneumo Boots:  You may need to wear SCD S on your legs or feet. These are wraps connected to a machine that pumps in air and releases it. The repeated pumping helps prevent blood clots from forming.     How do I prepare myself?  -  Take two showers: one the night before surgery; and one the morning of surgery.         Use Scrubcare or Hibiclens to wash from neck down.  You may use your own shampoo and conditioner. No other hair products.   -  Do NOT use lotion, powder, deodorant, or antiperspirant the day of your surgery.  -  Do NOT wear any makeup, fingernail polish or jewelry.  -Do not bring your own medications to the hospital, except for inhalers and eye drops.  -  Bring your ID and insurance card.    Questions or Concerns:  If you have questions or concerns, please call the  Preoperative Assessment Center, Monday-Friday 7AM-7PM:  [phone]

## 2018-01-31 NOTE — Clinical Note
Dr Jordan,  I had the pleasure of seeing your patient in the PAC. Please find the attached H&P.  If you have any questions or concerns I can be reached in the PAC.  Best Regards,  MARIANO Salazar  [facility] Preoperative Assessment Center Phone: [phone] Fax: [phone]

## 2018-01-31 NOTE — H&P
Pre-Operative H & P     CC:  Preoperative exam to assess for increased cardiopulmonary risk while undergoing surgery and anesthesia.    Date of Encounter: 2018  Primary Care Physician:  Dea Tolbert  Susan Liu is a 67 year old female who presents for pre-operative H & P in preparation for Microdirect Laryngoscopy With Incision/Dilation Of Subglottic Stenosis Possible Steroid Injection, Possible Mitomycin Application With CO2 Laser   with Dr. Jordan on 18 at [facility].  She has a history of subglottic stenosis and had her last laryngoscopy in .  She noticed increased difficulty with breathing approximately 3 months ago.  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      She denies any other acute symptoms of cough, congestion, sore throat, or fever/chills.     History is obtained from the patient.     Past Medical History  Past Medical History:   Diagnosis Date     Allergic rhinitis      Allergic rhinitis, cause unspecified      Anemia      Anxiety      Balance problem      Bleeding disorder (H)      Chronic sinusitis      Depression      Family history of thyroid problem      Hoarseness      Joint disease      Osteoarthritis      Osteoporosis      Pneumonia      Reflux      Sinus problem      Skin disease      Thyroid disease      Tinnitus        Past Surgical History  Past Surgical History:   Procedure Laterality Date     BUNIONECTOMY      both feet      SECTION      three     dilation for subglotic stenosis[       INJECT STEROID (LOCATION)  2013    Procedure: INJECT STEROID (LOCATION);;  Surgeon: Marcelina Jordan MD;  Location: [facility] OR     LASER CO2 LARYNGOSCOPY, COMPLEX  2013    Procedure: LASER CO2 LARYNGOSCOPY, COMPLEX;  Co2 Lumenis Laser Micro Direct Laryngoscopy, Incision and Dilation Subglottic stenosis, Steroid Injection, Mitomycin  application. ;  Surgeon: Marcelina Jordan MD;  Location: [facility] OR       Hx of Blood transfusions/reactions:  Denies    Hx of abnormal bleeding or anti-platelet use: No    Menstrual history: No LMP recorded. Patient is postmenopausal.:     Steroid use in the last year: Daily steroid    Personal or FH with difficulty with Anesthesia:  Denies    Prior to Admission Medications  Current Outpatient Prescriptions   Medication Sig Dispense Refill     Melatonin 10 MG TABS tablet Take 10 mg by mouth At Bedtime       Magnesium 400 MG TABS Take 400 mg by mouth At Bedtime       EVENING PRIMROSE OIL PO Take by mouth daily (with lunch)       TRAZODONE HCL PO Take 50 mg by mouth At Bedtime        dextromethorphan-guaiFENesin (MUCINEX DM)  MG per 12 hr tablet Take 1 tablet by mouth as needed        calcium carbonate-vitamin D 600-200 MG-UNIT TABS Take by mouth every morning        cetirizine (ZYRTEC) 10 MG tablet Take 10 mg by mouth At Bedtime        folic acid (FOLVITE) 1 MG tablet Take 1 mg by mouth every morning        ipratropium - albuterol 0.5 mg/2.5 mg/3 mL (DUONEB) 0.5-2.5 (3) MG/3ML nebulization Take 1 ampule by nebulization every 6 hours as needed       levothyroxine (SYNTHROID, LEVOTHROID) 50 MCG tablet Take 50 mcg by mouth every morning        methotrexate 2.5 MG tablet Take 15 mg by mouth once a week ON FRIDAY       Multiple Vitamin (DAILY MULTIVITAMIN PO) Take by mouth daily (with lunch)        omeprazole 20 MG tablet Take 40 mg by mouth daily (with lunch)        predniSONE (DELTASONE) 20 MG tablet Take 2.5 mg by mouth every morning        sulfamethoxazole-trimethoprim (BACTRIM) 400-80 MG per tablet Take 1 tablet by mouth every morning          Allergies  Allergies   Allergen Reactions     Penicillins Anaphylaxis     Codeine Nausea and Vomiting     Codeine Camsylate Nausea and Vomiting     Tramadol GI Disturbance     Tramadol Hcl        Social History  Social History     Social History     Marital status:  "     Spouse name: N/A     Number of children: N/A     Years of education: N/A     Occupational History     Not on file.     Social History Main Topics     Smoking status: Never Smoker     Smokeless tobacco: Never Used     Alcohol use Yes      Comment: 2 glasses wine a day     Drug use: No     Sexual activity: No     Other Topics Concern     Not on file     Social History Narrative       Family History  Family History   Problem Relation Age of Onset     Hypertension Mother      Dementia Mother      Depression Mother      Thyroid Disease Mother      Migraines Mother      Hypertension Father      Alcohol/Drug Father      Depression Father      HEART DISEASE Brother      CANCER Brother      Thyroid Disease Daughter      Migraines Daughter      2 daughters       Review of Systems  Preprocedure Note     Last edited 01/31/18 0930 by Kina Polk APRN CNP                       ROS/MED HX    ENT/Pulmonary:  - neg pulmonary ROS     Neurologic:  - neg neurologic ROS     Cardiovascular: Comment:  A fibrillation 20 years ago    (+) ----. : . . . :. dysrhythmias a-fib, . Previous cardiac testing       METS/Exercise Tolerance: Comment: Can walk 50 feet prior to SOB 1 - Eating, dressing   Hematologic:  - neg hematologic  ROS       Musculoskeletal:         GI/Hepatic:     (+) GERD Asymptomatic on medication,       Renal/Genitourinary:  - ROS Renal section negative       Endo:     (+) thyroid problem hypothyroidism, .      Psychiatric:     (+) psychiatric history anxiety      Infectious Disease:  - neg infectious disease ROS       Malignancy:      - no malignancy   Other:    (+) No chance of pregnancy C-spine cleared: N/A, no H/O Chronic Pain,no other significant disability                The complete review of systems is negative other than noted in the HPI or here.   Temp: 98.2  F (36.8  C) Temp src: Oral BP: (!) 168/95 Pulse: 91   Resp: 18 SpO2: 98 %         176 lbs 12.8 oz  5' 1\"   Body mass index is 33.41 " kg/(m^2).       Physical Exam  Constitutional: Awake, alert, cooperative, no apparent distress, and appears stated age.  Eyes: Pupils equal, round and reactive to light, extra ocular muscles intact, sclera clear, conjunctiva normal.  HENT: Normocephalic, oral pharynx with moist mucus membranes,  Edentulous dentition. No goiter appreciated.   Respiratory: Clear to auscultation bilaterally, no crackles or wheezing.  Cardiovascular: Regular rate and rhythm, normal S1 and S2, and no murmur noted.  Carotids +2, no bruits. No edema. Palpable pulses to radial  DP and PT arteries.   GI: Normal bowel sounds, soft, non-distended, non-tender, no masses palpated, no hepatosplenomegaly.    Lymph/Hematologic: No cervical lymphadenopathy and no supraclavicular lymphadenopathy.  Genitourinary:  N/A  Skin: Warm and dry.  No rashes at anticipated surgical site.   Musculoskeletal: Full ROM of neck. There is no redness, warmth, or swelling of the joints. Gross motor strength is normal.    Neurologic: Awake, alert, oriented to name, place and time. Cranial nerves II-XII are grossly intact. Gait is normal.   Neuropsychiatric: Calm, cooperative. Normal affect.     Labs: (personally reviewed)  None indicated today  EKG: NSR     Outside records reviewed from:     ASSESSMENT and PLAN  Susan Liu is a 67 year old female scheduled to undergo  Microdirect Laryngoscopy With Incision/Dilation Of Subglottic Stenosis Possible Steroid Injection, Possible Mitomycin Application With CO2 Laser   with Dr. Jordan on 2/1/18 at [facility]. She has the following specific operative considerations:     1. Cardiac- < 4 METS d/t subglottic stenosis, EKG- NSR, no known cardiac disease no further work up indicated  2. Wesley's- well managed, continue Methotrexate and Prednisone- 2.5 mg daily, consider stress dose steroids  Instructed to continue inhalers as indicated  3. Hypothyroidism- stable, last level in Nov  currently euthyroid. - instructed to continue synthroid the DOS  4. Anesthesia- denies previous complications, airway appears feasible, edentulous    - RCRI : 0.4%  risk of major adverse cardiac event.   - Anesthesia considerations:  Refer to PAC assessment in anesthesia records  - VTE risk: 0.5%  - YANNA # of risks /8 = Low risk   - Risk of PONV score = 1  If > 2, anti-emetic intervention recommended.    Patient was discussed with Dr Quijano.    YAJAIRA Bridges CNP  Preoperative Assessment Center  [facility]  [facility]  Phone: [phone]  Fax: [phone]

## 2018-01-31 NOTE — ANESTHESIA PREPROCEDURE EVALUATION
Susan Liu is a 67 year old female with a PMH of  Subglottic Stenosis, Wegener's Granulomatosis   who is scheduled for Procedure(s):  Microdirect Laryngoscopy With Incision/Dilation Of Subglottic Stenosis Possible Steroid Injection, Possible Mitomycin Application With CO2 Laser  - Wound Class: II-Clean Contaminated   - Wound Class: I-Clean    NPO Status: Adequate.  > 6 hours solids, > 2 hours clear liquids.       Past Surgical History:   Procedure Laterality Date     BUNIONECTOMY      both feet      SECTION      three     dilation for subglotic stenosis[       INJECT STEROID (LOCATION)  2013    Procedure: INJECT STEROID (LOCATION);;  Surgeon: Marcelina Jordan MD;  Location: [facility] OR     LASER CO2 LARYNGOSCOPY, COMPLEX  2013    Procedure: LASER CO2 LARYNGOSCOPY, COMPLEX;  Co2 Lumenis Laser Micro Direct Laryngoscopy, Incision and Dilation Subglottic stenosis, Steroid Injection, Mitomycin application. ;  Surgeon: Marcelina Jordan MD;  Location: [facility] OR       Anesthesia Evaluation     . Pt has had prior anesthetic. Type: General           ROS/MED HX    ENT/Pulmonary:  - neg pulmonary ROS     Neurologic:  - neg neurologic ROS     Cardiovascular: Comment:  A fibrillation 20 years ago  BP elevated today however normally in the 130s/80s    (+) ----. : . . . :. dysrhythmias a-fib, . Previous cardiac testing date:results:date: results:ECG reviewed date: results:NSR, possible right atrial enlargement. date: results:          METS/Exercise Tolerance: Comment: Can walk 50 feet prior to SOB 1 - Eating, dressing   Hematologic:  - neg hematologic  ROS       Musculoskeletal:         GI/Hepatic:     (+) GERD Asymptomatic on medication,       Renal/Genitourinary:  - ROS Renal section negative       Endo:     (+) thyroid problem hypothyroidism, .      Psychiatric:     (+) psychiatric history anxiety      Infectious Disease:  - neg infectious disease ROS       Malignancy:      - no malignancy    Other:    (+) No chance of pregnancy C-spine cleared: N/A, no H/O Chronic Pain,no other significant disability                    Physical Exam  Normal systems: cardiovascular and pulmonary    Airway   Mallampati: III  TM distance: <3 FB  Neck ROM: full    Dental   (+) upper dentures and lower dentures  Comment: Will not bring the DOS    Cardiovascular   Rhythm and rate: regular and normal      Pulmonary    breath sounds clear to auscultation               PAC Discussion and Assessment    ASA Classification: 3  Case is suitable for: [place]  Anesthetic techniques and relevant risks discussed: GA  Invasive monitoring and risk discussed: Yes  Types:   Possibility and Risk of blood transfusion discussed: No  NPO instructions given: NPO after midnight  Additional anesthetic preparation and risks discussed:   Needs early admission to pre-op area:   Other:     PAC Resident/NP Anesthesia Assessment:  ASSESSMENT and PLAN  Susan Liu is a 67 year old female scheduled to undergo  Microdirect Laryngoscopy With Incision/Dilation Of Subglottic Stenosis Possible Steroid Injection, Possible Mitomycin Application With CO2 Laser   with Dr. Jordan on 2/1/18 at [facility]. She has the following specific operative considerations:     1. Cardiac- < 4 METS d/t subglottic stenosis, EKG- NSR, no known cardiac disease no further work up indicated  2. Wesley's- well managed, continue Methotrexate and Prednisone- 2.5 mg daily, consider stress dose steroids  Instructed to continue inhalers as indicated  3. Hypothyroidism- stable (last level in Nov) currently euthyroid. - instructed to continue synthroid the DOS  4. Anesthesia- denies previous complications, airway appears feasible, edentulous    - RCRI : 0.4%  risk of major adverse cardiac event.   - VTE risk: 0.5%  - YANNA # of risks /8 = Low risk   - Risk of PONV score = 1  If > 2, anti-emetic intervention recommended.    Patient was  discussed with Dr Quijaon.    YAJAIRA Bridges CNP    Reviewed and Signed by PAC Mid-Level Provider/Resident  Mid-Level Provider/Resident:   Date:   Time:     Attending Anesthesiologist Anesthesia Assessment:  67 year old for laser laryngoscopy for subglottic stenosis. This stenosis likely due to Carl's granulomatosis; she is stable at this point. She has inspiratory stridor, and is becoming limited in exercise capacity due to the stenosis, but no known cardiac or pulmonary disease other than a fib noted on her problem list - she is not in a fib now.    Patient/case discussed with LACI. No need to see patient. Patient is appropriate for the planned procedure without further work-up or medical management.      Reviewed and Signed by PAC Anesthesiologist  Anesthesiologist: gloria  Date: 1/31/2018  Time:   Pass/Fail: Pass  Disposition:     PAC Pharmacist Assessment:        Pharmacist:   Date:   Time:      Anesthesia Plan      History & Physical Review  History and physical reviewed and following examination; no interval change.    ASA Status:  3 .        Plan for General and Other (Jet ventilation) with Intravenous induction.   PONV prophylaxis:  Ondansetron (or other 5HT-3) and Dexamethasone or Solumedrol  Remains on low dose prednisone - 2.5 mg daily - unlikely to need stress dose steroids. Will give decadron 10 mg as per ENT.      Postoperative Care  Postoperative pain management:  Oral pain medications.      Consents  Anesthetic plan, risks, benefits and alternatives discussed with:  Patient..          Lincoln Freeman MD  11:10 AM February 1, 2018                     .

## 2018-01-31 NOTE — MR AVS SNAPSHOT
After Visit Summary   2018    Susan Liu    MRN: [id number]           Patient Information     Date Of Birth          1950        Visit Information        Provider Department      2018 10:00 AM Rn, [facility] Preoperative Assessment [facility]        Care Instructions    Preparing for Your Surgery      Name:  Susan Liu   MRN:  [id number]   :  1950   Today's Date:  2018     Arriving for surgery:  Surgery date:  18  Arrival time:  09:10 a.m.  Please come to:       [facility] [address]  [address] [place]  [address]       parking is available in front of the hospital from 5:15 am to 8:00 pm    -  Stop at the Information Desk in the lobby    -   Inform the information person that you are here for surgery. An escort to 3c will be provided. If you would not like an escort, please proceed to 3C on the 3rd floor. [phone]     What can I eat or drink?  -  You may have solid food or milk products until 8 hours prior to your surgery  midnight  -  You may have water, apple juice or 7up/Sprite until 2 hours prior to your surgery  09:10 a.m.    Which medicines can I take?  -  Do NOT take these medications in the morning, the day of surgery:     Calcium, folic acid, Meloxicam, methotrexate, multivitamin, evening primrose, magnesium    -  Please take these medications the day of surgery:      Mucinex as needed, Zyrtec, Omeprazole, prednisone, bactrim  AFTER YOUR SURGERY  Breathing exercises   Breathing exercises help you recover faster. Take deep breaths and let the air out slowly. This will:     Help you wake up after surgery.    Help prevent complications like pneumonia.  Preventing complications will help you go home sooner.   We may give you a breathing device (incentive spirometer) to encourage you to breathe deeply.   Nausea and vomiting   You may feel sick to your stomach after surgery; if so, let your nurse know.     Pain control:  After surgery, you may have pain. Our goal is to help you manage your pain. Pain medicine will help you feel comfortable enough to do activities that will help you heal.  These activities may include breathing exercises, walking and physical therapy.   To help your health care team treat your pain we will ask: 1) If you have pain  2) where it is located 3) describe your pain in your words  Methods of pain control include medications given by mouth, vein or by nerve block for some surgeries.  We may give you a pain control pump that will:  1) Deliver the medicine through a tube placed in your vein  2) Control the amount of medicine you receive  3) Allow you to push a button to deliver a dose of pain medicine  Sequential Compression Device (SCD) or Pneumo Boots:  You may need to wear SCD S on your legs or feet. These are wraps connected to a machine that pumps in air and releases it. The repeated pumping helps prevent blood clots from forming.     How do I prepare myself?  -  Take two showers: one the night before surgery; and one the morning of surgery.         Use Scrubcare or Hibiclens to wash from neck down.  You may use your own shampoo and conditioner. No other hair products.   -  Do NOT use lotion, powder, deodorant, or antiperspirant the day of your surgery.  -  Do NOT wear any makeup, fingernail polish or jewelry.  -Do not bring your own medications to the hospital, except for inhalers and eye drops.  -  Bring your ID and insurance card.    Questions or Concerns:  If you have questions or concerns, please call the  Preoperative Assessment Center, Monday-Friday 7AM-7PM:  [phone]                    Follow-ups after your visit        Your next 10 appointments already scheduled     Jan 31, 2018 10:00 AM CST   (Arrive by 9:45 AM)   PAC RN ASSESSMENT with Zahra Pac Rn   [facility] Preoperative [facility] ([facility])    [address] [facility]  [address] [facility]  [address]   [phone]            Jan 31, 2018 10:40 AM CST   (Arrive by 10:25 AM)   PAC Anesthesia Consult with  Pac Anesthesiologist   [facility] Preoperative Assessment Center ([facility])    [address] [place] [address]  [address] [facility] [address]   [phone]            Jan 31, 2018 11:00 AM CST   LAB with  LAB   [facility] Lab ([facility])    [address] [place] [id number] [phone]           Please do not eat 10-12 hours before your appointment if you are coming in fasting for labs on lipids, cholesterol, or glucose (sugar). This does not apply to pregnant women. Water, hot tea and black coffee (with nothing added) are okay. Do not drink other fluids, diet soda or chew gum.            Feb 01, 2018   Procedure with Marcelina Jordan MD   [facility] [place], Same Day Surgery (--)    [address] [facility] [address]   [phone]            Feb 26, 2018 10:40 AM CST   (Arrive by 10:25 AM)   Return Visit with Marcelina Jordan MD   [facility] Ear Nose and Throat ([facility])    [address] [place] [id number] [phone]           This is a multi-disciplinary care team visit as patients with your type of problem are usually seen by a team of an MD and a Speech-Language Pathologist (who is a specialist in disorders of the voice, throat, and breathing).  Please plan about 2 hours for your visit, which will likely include Laryngeal Function Studies, a Voice/Swallow/Breathing Evaluation, and an Endoscopic Laryngeal Examination to provide a comprehensive evaluation.  Please check with your insurance company to ensure you are covered for these services. - It is important to know that if you are evaluated and/or treated by both a physician and a speech pathologist during your visit, your billing will reflect the input that you receive from both providers as separate  professionals. Although most insurance plans do cover these services, we encourage you to contact your insurance company prior to your visit to determine whether there are any coverage limitations that might affect you financially. - Billing/procedure codes that are frequently associated with visits to our clinic include (but are not limited to) the ones listed below. Most patients will not need all of these items, but it may be useful to ask your insurance company's patient . 99693: Flexible fiberoptic laryngoscopy, 13367: Laryngoscopy; flexible or rigid fiberoptic, with stroboscopy, 10317: Flexible endoscopic evaluation of swallowing, 14122: Laryngeal function aerodynamic evaluation, 63110: Evaluation of Voice and Resonance, 49178: Speech pathology treatment for voice, speech, communication, 47726: Speech pathology treatment for swallowing/oral function for feeding - If you have any concerns or questions, or if you would prefer not to have a speech pathologist involved in your visit, please contact our Clinic Coordinator at [phone], at least 24 hours prior to your appointment.              Who to contact     Please call your clinic at [phone] to:    Ask questions about your health    Make or cancel appointments    Discuss your medicines    Learn about your test results    Speak to your doctor   If you have compliments or concerns about an experience at your clinic, or if you wish to file a complaint, please contact [facility] Patient Relations at [phone] or email us at [email or web address]         Additional Information About Your Visit        [employer] Information     [employer] is an electronic gateway that provides easy, online access to your medical records. With [employer], you can request a clinic appointment, read your test results, renew a prescription or communicate with your care team.     To sign up for [employer] visit the website at  [email or web address]   You will be asked to enter the access code listed below, as well as some personal information. Please follow the directions to create your username and password.     Your access code is: [email or web address]  Expires: 4/10/2018  6:30 AM     Your access code will  in 90 days. If you need help or a new code, please contact your [facility] or call [phone] for assistance.        Care EveryWhere ID     This is your Care EveryWhere ID. This could be used by other organizations to access your [place] medical records  [id number]         Blood Pressure from Last 3 Encounters:   18 (!) 168/95   13 122/62    Weight from Last 3 Encounters:   18 80.2 kg (176 lb 12.8 oz)   18 80.3 kg (177 lb)   18 81.2 kg (179 lb)              Today, you had the following     No orders found for display       Primary Care Provider Office Phone # Fax #    Dea Tomasz [phone]       [facility] [address] [address]        Equal Access to Services     TEN HOOKS : Hadii aad ku hadasho Soomaali, waaxda luqadaha, qaybta kaalmada adeegyada, waxay idiin hayaan adeeg justyn lafran . So [facility] [phone].    ATENCIÓN: Si habla español, tiene a apple disposición servicios gratuitos de asistencia lingüística. Llame al [phone].    We comply with applicable federal civil rights laws and [place] laws. We do not discriminate on the basis of race, color, national origin, age, disability, sex, sexual orientation, or gender identity.            Thank you!     Thank you for choosing [facility] PREOPERATIVE ASSESSMENT CENTER  for your care. Our goal is always to provide you with excellent care. Hearing back from our patients is one way we can continue to improve our services. Please take a few minutes to complete the written survey that you may receive in the mail after your visit with us. Thank you!              Your Updated Medication List - Protect others around you: Learn how to safely use, store and throw away your medicines at www.disposemymeds.org.          This list is accurate as of 1/31/18  9:40 AM.  Always use your most recent med list.                   Brand Name Dispense Instructions for use Diagnosis    BACTRIM 400-80 MG per tablet   Generic drug:  sulfamethoxazole-trimethoprim      Take 1 tablet by mouth every morning        calcium carbonate-vitamin D 600-200 MG-UNIT Tabs      Take by mouth every morning        cetirizine 10 MG tablet    zyrTEC     Take 10 mg by mouth At Bedtime        DAILY MULTIVITAMIN PO      Take by mouth daily (with lunch)        dextromethorphan-guaiFENesin  MG per 12 hr tablet    MUCINEX DM     Take 1 tablet by mouth as needed        DUONEB 0.5-2.5 (3) MG/3ML neb solution   Generic drug:  ipratropium - albuterol 0.5 mg/2.5 mg/3 mL      Take 1 ampule by nebulization every 6 hours as needed        EVENING PRIMROSE OIL PO      Take by mouth daily (with lunch)        folic acid 1 MG tablet    FOLVITE     Take 1 mg by mouth every morning        levothyroxine 50 MCG tablet    SYNTHROID/LEVOTHROID     Take 50 mcg by mouth every morning        Magnesium 400 MG Tabs      Take 400 mg by mouth At Bedtime        Melatonin 10 MG Tabs tablet      Take 10 mg by mouth At Bedtime        methotrexate 2.5 MG tablet CHEMO      Take 15 mg by mouth once a week ON FRIDAY        omeprazole 20 MG tablet      Take 40 mg by mouth daily (with lunch)        predniSONE 20 MG tablet    DELTASONE     Take 2.5 mg by mouth every morning        TRAZODONE HCL PO      Take 50 mg by mouth At Bedtime

## 2018-02-01 ENCOUNTER — HOSPITAL ENCOUNTER (OUTPATIENT)
Facility: CLINIC | Age: 68
Discharge: HOME | End: 2018-02-01
Attending: OTOLARYNGOLOGY | Admitting: OTOLARYNGOLOGY
Payer: MEDICARE

## 2018-02-01 ENCOUNTER — APPOINTMENT (OUTPATIENT)
Dept: GENERAL RADIOLOGY | Facility: CLINIC | Age: 68
End: 2018-02-01
Attending: OTOLARYNGOLOGY
Payer: MEDICARE

## 2018-02-01 ENCOUNTER — SURGERY (OUTPATIENT)
Age: 68
End: 2018-02-01

## 2018-02-01 ENCOUNTER — ANESTHESIA (OUTPATIENT)
Dept: SURGERY | Facility: CLINIC | Age: 68
End: 2018-02-01
Payer: MEDICARE

## 2018-02-01 VITALS
OXYGEN SATURATION: 100 % | TEMPERATURE: 97.8 F | SYSTOLIC BLOOD PRESSURE: 138 MMHG | RESPIRATION RATE: 16 BRPM | DIASTOLIC BLOOD PRESSURE: 89 MMHG | BODY MASS INDEX: 33.26 KG/M2 | WEIGHT: 176.15 LBS | HEIGHT: 61 IN | HEART RATE: 78 BPM

## 2018-02-01 DIAGNOSIS — J38.6 SUBGLOTTIC STENOSIS: Primary | ICD-10-CM

## 2018-02-01 LAB — GLUCOSE BLDC GLUCOMTR-MCNC: 94 MG/DL (ref 70–99)

## 2018-02-01 PROCEDURE — 40000170 ZZH STATISTIC PRE-PROCEDURE ASSESSMENT II: Performed by: OTOLARYNGOLOGY

## 2018-02-01 PROCEDURE — 25000128 H RX IP 250 OP 636: Performed by: OTOLARYNGOLOGY

## 2018-02-01 PROCEDURE — 25000125 ZZHC RX 250: Performed by: NURSE ANESTHETIST, CERTIFIED REGISTERED

## 2018-02-01 PROCEDURE — 71045 X-RAY EXAM CHEST 1 VIEW: CPT

## 2018-02-01 PROCEDURE — 25000128 H RX IP 250 OP 636: Performed by: NURSE ANESTHETIST, CERTIFIED REGISTERED

## 2018-02-01 PROCEDURE — 25000128 H RX IP 250 OP 636: Performed by: ANESTHESIOLOGY

## 2018-02-01 PROCEDURE — 71000027 ZZH RECOVERY PHASE 2 EACH 15 MINS: Performed by: OTOLARYNGOLOGY

## 2018-02-01 PROCEDURE — 25000125 ZZHC RX 250: Performed by: OTOLARYNGOLOGY

## 2018-02-01 PROCEDURE — 27210794 ZZH OR GENERAL SUPPLY STERILE: Performed by: OTOLARYNGOLOGY

## 2018-02-01 PROCEDURE — 27210995 ZZH RX 272: Performed by: OTOLARYNGOLOGY

## 2018-02-01 PROCEDURE — 36000062 ZZH SURGERY LEVEL 4 1ST 30 MIN - UMMC: Performed by: OTOLARYNGOLOGY

## 2018-02-01 PROCEDURE — 87186 SC STD MICRODIL/AGAR DIL: CPT | Performed by: OTOLARYNGOLOGY

## 2018-02-01 PROCEDURE — 82962 GLUCOSE BLOOD TEST: CPT

## 2018-02-01 PROCEDURE — 87070 CULTURE OTHR SPECIMN AEROBIC: CPT | Performed by: OTOLARYNGOLOGY

## 2018-02-01 PROCEDURE — C1726 CATH, BAL DIL, NON-VASCULAR: HCPCS | Performed by: OTOLARYNGOLOGY

## 2018-02-01 PROCEDURE — 87077 CULTURE AEROBIC IDENTIFY: CPT | Performed by: OTOLARYNGOLOGY

## 2018-02-01 PROCEDURE — C9399 UNCLASSIFIED DRUGS OR BIOLOG: HCPCS | Performed by: NURSE ANESTHETIST, CERTIFIED REGISTERED

## 2018-02-01 PROCEDURE — 37000009 ZZH ANESTHESIA TECHNICAL FEE, EACH ADDTL 15 MIN: Performed by: OTOLARYNGOLOGY

## 2018-02-01 PROCEDURE — 71000014 ZZH RECOVERY PHASE 1 LEVEL 2 FIRST HR: Performed by: OTOLARYNGOLOGY

## 2018-02-01 PROCEDURE — 37000008 ZZH ANESTHESIA TECHNICAL FEE, 1ST 30 MIN: Performed by: OTOLARYNGOLOGY

## 2018-02-01 PROCEDURE — 88305 TISSUE EXAM BY PATHOLOGIST: CPT | Performed by: OTOLARYNGOLOGY

## 2018-02-01 PROCEDURE — 87102 FUNGUS ISOLATION CULTURE: CPT | Performed by: OTOLARYNGOLOGY

## 2018-02-01 PROCEDURE — 36000064 ZZH SURGERY LEVEL 4 EA 15 ADDTL MIN - UMMC: Performed by: OTOLARYNGOLOGY

## 2018-02-01 RX ORDER — PROPOFOL 10 MG/ML
INJECTION, EMULSION INTRAVENOUS PRN
Status: DISCONTINUED | OUTPATIENT
Start: 2018-02-01 | End: 2018-02-01

## 2018-02-01 RX ORDER — MAGNESIUM HYDROXIDE 1200 MG/15ML
LIQUID ORAL PRN
Status: DISCONTINUED | OUTPATIENT
Start: 2018-02-01 | End: 2018-02-01 | Stop reason: HOSPADM

## 2018-02-01 RX ORDER — FENTANYL CITRATE 50 UG/ML
25-50 INJECTION, SOLUTION INTRAMUSCULAR; INTRAVENOUS EVERY 5 MIN PRN
Status: DISCONTINUED | OUTPATIENT
Start: 2018-02-01 | End: 2018-02-01 | Stop reason: HOSPADM

## 2018-02-01 RX ORDER — CLINDAMYCIN PHOSPHATE 900 MG/50ML
900 INJECTION, SOLUTION INTRAVENOUS
Status: COMPLETED | OUTPATIENT
Start: 2018-02-01 | End: 2018-02-01

## 2018-02-01 RX ORDER — SODIUM CHLORIDE, SODIUM LACTATE, POTASSIUM CHLORIDE, CALCIUM CHLORIDE 600; 310; 30; 20 MG/100ML; MG/100ML; MG/100ML; MG/100ML
INJECTION, SOLUTION INTRAVENOUS CONTINUOUS
Status: DISCONTINUED | OUTPATIENT
Start: 2018-02-01 | End: 2018-02-01 | Stop reason: HOSPADM

## 2018-02-01 RX ORDER — LIDOCAINE HYDROCHLORIDE 40 MG/ML
SOLUTION TOPICAL PRN
Status: DISCONTINUED | OUTPATIENT
Start: 2018-02-01 | End: 2018-02-01 | Stop reason: HOSPADM

## 2018-02-01 RX ORDER — LIDOCAINE HYDROCHLORIDE 20 MG/ML
INJECTION, SOLUTION INFILTRATION; PERINEURAL PRN
Status: DISCONTINUED | OUTPATIENT
Start: 2018-02-01 | End: 2018-02-01

## 2018-02-01 RX ORDER — ONDANSETRON 4 MG/1
4 TABLET, ORALLY DISINTEGRATING ORAL EVERY 30 MIN PRN
Status: DISCONTINUED | OUTPATIENT
Start: 2018-02-01 | End: 2018-02-01 | Stop reason: HOSPADM

## 2018-02-01 RX ORDER — ONDANSETRON 2 MG/ML
4 INJECTION INTRAMUSCULAR; INTRAVENOUS EVERY 30 MIN PRN
Status: DISCONTINUED | OUTPATIENT
Start: 2018-02-01 | End: 2018-02-01 | Stop reason: HOSPADM

## 2018-02-01 RX ORDER — OXYCODONE HYDROCHLORIDE 5 MG/1
5-10 TABLET ORAL
Qty: 10 TABLET | Refills: 0 | Status: SHIPPED | OUTPATIENT
Start: 2018-02-01 | End: 2018-03-09

## 2018-02-01 RX ORDER — ONDANSETRON 2 MG/ML
INJECTION INTRAMUSCULAR; INTRAVENOUS PRN
Status: DISCONTINUED | OUTPATIENT
Start: 2018-02-01 | End: 2018-02-01

## 2018-02-01 RX ORDER — AMOXICILLIN 250 MG
1-2 CAPSULE ORAL 2 TIMES DAILY
Qty: 30 TABLET | Refills: 0 | Status: SHIPPED | OUTPATIENT
Start: 2018-02-01 | End: 2018-03-09

## 2018-02-01 RX ORDER — PROPOFOL 10 MG/ML
INJECTION, EMULSION INTRAVENOUS CONTINUOUS PRN
Status: DISCONTINUED | OUTPATIENT
Start: 2018-02-01 | End: 2018-02-01

## 2018-02-01 RX ORDER — ONDANSETRON 8 MG/1
4 TABLET, FILM COATED ORAL EVERY 8 HOURS PRN
Qty: 9 TABLET | Refills: 0 | Status: SHIPPED | OUTPATIENT
Start: 2018-02-01 | End: 2018-03-09

## 2018-02-01 RX ORDER — SODIUM CHLORIDE, SODIUM LACTATE, POTASSIUM CHLORIDE, CALCIUM CHLORIDE 600; 310; 30; 20 MG/100ML; MG/100ML; MG/100ML; MG/100ML
INJECTION, SOLUTION INTRAVENOUS CONTINUOUS PRN
Status: DISCONTINUED | OUTPATIENT
Start: 2018-02-01 | End: 2018-02-01

## 2018-02-01 RX ORDER — DEXAMETHASONE SODIUM PHOSPHATE 4 MG/ML
10 INJECTION, SOLUTION INTRA-ARTICULAR; INTRALESIONAL; INTRAMUSCULAR; INTRAVENOUS; SOFT TISSUE ONCE
Status: COMPLETED | OUTPATIENT
Start: 2018-02-01 | End: 2018-02-01

## 2018-02-01 RX ORDER — TRIAMCINOLONE ACETONIDE 40 MG/ML
INJECTION, SUSPENSION INTRA-ARTICULAR; INTRAMUSCULAR PRN
Status: DISCONTINUED | OUTPATIENT
Start: 2018-02-01 | End: 2018-02-01 | Stop reason: HOSPADM

## 2018-02-01 RX ORDER — NALOXONE HYDROCHLORIDE 0.4 MG/ML
.1-.4 INJECTION, SOLUTION INTRAMUSCULAR; INTRAVENOUS; SUBCUTANEOUS
Status: DISCONTINUED | OUTPATIENT
Start: 2018-02-01 | End: 2018-02-01 | Stop reason: HOSPADM

## 2018-02-01 RX ORDER — FENTANYL CITRATE 50 UG/ML
INJECTION, SOLUTION INTRAMUSCULAR; INTRAVENOUS PRN
Status: DISCONTINUED | OUTPATIENT
Start: 2018-02-01 | End: 2018-02-01

## 2018-02-01 RX ORDER — SULFAMETHOXAZOLE/TRIMETHOPRIM 800-160 MG
1 TABLET ORAL 2 TIMES DAILY
Qty: 14 TABLET | Refills: 0 | Status: SHIPPED | OUTPATIENT
Start: 2018-02-01 | End: 2018-02-08

## 2018-02-01 RX ADMIN — ROCURONIUM BROMIDE 40 MG: 10 INJECTION INTRAVENOUS at 10:21

## 2018-02-01 RX ADMIN — LIDOCAINE HYDROCHLORIDE 80 MG: 20 INJECTION, SOLUTION INFILTRATION; PERINEURAL at 10:18

## 2018-02-01 RX ADMIN — FENTANYL CITRATE 50 MCG: 50 INJECTION, SOLUTION INTRAMUSCULAR; INTRAVENOUS at 10:30

## 2018-02-01 RX ADMIN — ROCURONIUM BROMIDE 15 MG: 10 INJECTION INTRAVENOUS at 12:16

## 2018-02-01 RX ADMIN — CLINDAMYCIN PHOSPHATE 900 MG: 18 INJECTION, SOLUTION INTRAVENOUS at 10:22

## 2018-02-01 RX ADMIN — PROPOFOL 175 MCG/KG/MIN: 10 INJECTION, EMULSION INTRAVENOUS at 10:21

## 2018-02-01 RX ADMIN — FENTANYL CITRATE 75 MCG: 50 INJECTION, SOLUTION INTRAMUSCULAR; INTRAVENOUS at 12:13

## 2018-02-01 RX ADMIN — MITOMYCIN 2 ML: 40 INJECTION, POWDER, LYOPHILIZED, FOR SOLUTION INTRAVENOUS at 12:29

## 2018-02-01 RX ADMIN — PROPOFOL 50 MG: 10 INJECTION, EMULSION INTRAVENOUS at 12:12

## 2018-02-01 RX ADMIN — FENTANYL CITRATE 50 MCG: 50 INJECTION, SOLUTION INTRAMUSCULAR; INTRAVENOUS at 10:15

## 2018-02-01 RX ADMIN — PROPOFOL 50 MG: 10 INJECTION, EMULSION INTRAVENOUS at 12:10

## 2018-02-01 RX ADMIN — ONDANSETRON 4 MG: 2 INJECTION INTRAMUSCULAR; INTRAVENOUS at 11:56

## 2018-02-01 RX ADMIN — ROCURONIUM BROMIDE 10 MG: 10 INJECTION INTRAVENOUS at 11:08

## 2018-02-01 RX ADMIN — SODIUM CHLORIDE, POTASSIUM CHLORIDE, SODIUM LACTATE AND CALCIUM CHLORIDE: 600; 310; 30; 20 INJECTION, SOLUTION INTRAVENOUS at 10:00

## 2018-02-01 RX ADMIN — EPINEPHRINE 10 ML: 0.1 INJECTION, SOLUTION ENDOTRACHEAL; INTRACARDIAC; INTRAVENOUS at 11:26

## 2018-02-01 RX ADMIN — DEXAMETHASONE SODIUM PHOSPHATE 10 MG: 4 INJECTION, SOLUTION INTRA-ARTICULAR; INTRALESIONAL; INTRAMUSCULAR; INTRAVENOUS; SOFT TISSUE at 10:23

## 2018-02-01 RX ADMIN — LIDOCAINE HYDROCHLORIDE 4 ML: 40 SOLUTION TOPICAL at 12:08

## 2018-02-01 RX ADMIN — SODIUM CHLORIDE 1000 ML: 900 IRRIGANT IRRIGATION at 12:08

## 2018-02-01 RX ADMIN — SUGAMMADEX 320 MG: 100 INJECTION, SOLUTION INTRAVENOUS at 12:34

## 2018-02-01 RX ADMIN — ONDANSETRON 4 MG: 2 INJECTION INTRAMUSCULAR; INTRAVENOUS at 13:13

## 2018-02-01 RX ADMIN — TRIAMCINOLONE ACETONIDE 60 MG: 40 INJECTION, SUSPENSION INTRA-ARTICULAR; INTRAMUSCULAR at 12:07

## 2018-02-01 RX ADMIN — FENTANYL CITRATE 25 MCG: 50 INJECTION, SOLUTION INTRAMUSCULAR; INTRAVENOUS at 13:31

## 2018-02-01 RX ADMIN — MIDAZOLAM 1 MG: 1 INJECTION INTRAMUSCULAR; INTRAVENOUS at 10:03

## 2018-02-01 RX ADMIN — PROPOFOL 50 MG: 10 INJECTION, EMULSION INTRAVENOUS at 10:19

## 2018-02-01 ASSESSMENT — PAIN DESCRIPTION - DESCRIPTORS: DESCRIPTORS: DISCOMFORT;SORE

## 2018-02-01 NOTE — IP AVS SNAPSHOT
"                  MRN:[id number]                      After Visit Summary   2/1/2018    Susan Liu    MRN: [id number]           Thank you!     Thank you for choosing [facility] for your care. Our goal is always to provide you with excellent care. Hearing back from our patients is one way we can continue to improve our services. Please take a few minutes to complete the written survey that you may receive in the mail after you visit with us. Thank you!        Patient Information     Date Of Birth          1950        About your hospital stay     You were admitted on:  February 1, 2018 You last received care in the:  Same Day Surgery [facility]    You were discharged on:  February 1, 2018       Who to Call     For medical emergencies, please call 911.  For non-urgent questions about your medical care, please call your primary care provider or clinic, [phone]  For questions related to your surgery, please call your surgery clinic        Attending Provider     Provider Marcelina Fay MD Otolaryngology       Primary Care Provider Office Phone # Fax #    Dea Tolbert [phone]      After Care Instructions     Diet Instructions       Resume pre procedure diet            No Alcohol       For 24 hours following procedure            No driving or operating machinery       until the day after procedure            Notify Physician        Please notify your doctor if have any increased shortness of breath, difffculty breathing or swallowing or fevers > 101.4. If you have questions or concerns during the day please call ENT clinic at [phone]. If at night you can call [facility] at [phone] and ask for the \"ENT resident on call\".                  Your next 10 appointments already scheduled     Feb 26, 2018 10:40 AM CST   (Arrive by 10:25 AM)   Return Visit with Marcelina Jordan MD   [facility] ([facility]    [address]  4th Floor  [facility] [address] [phone]           This is a multi-disciplinary care team visit as patients with your type of problem are usually seen by a team of an MD and a Speech-Language Pathologist (who is a specialist in disorders of the voice, throat, and breathing).  Please plan about 2 hours for your visit, which will likely include Laryngeal Function Studies, a Voice/Swallow/Breathing Evaluation, and an Endoscopic Laryngeal Examination to provide a comprehensive evaluation.  Please check with your insurance company to ensure you are covered for these services. - It is important to know that if you are evaluated and/or treated by both a physician and a speech pathologist during your visit, your billing will reflect the input that you receive from both providers as separate professionals. Although most insurance plans do cover these services, we encourage you to contact your insurance company prior to your visit to determine whether there are any coverage limitations that might affect you financially. - Billing/procedure codes that are frequently associated with visits to our clinic include (but are not limited to) the ones listed below. Most patients will not need all of these items, but it may be useful to ask your insurance company's patient . 45038: Flexible fiberoptic laryngoscopy, 24044: Laryngoscopy; flexible or rigid fiberoptic, with stroboscopy, 75292: Flexible endoscopic evaluation of swallowing, 35687: Laryngeal function aerodynamic evaluation, 81043: Evaluation of Voice and Resonance, 31864: Speech pathology treatment for voice, speech, communication, 91191: Speech pathology treatment for swallowing/oral function for feeding - If you have any concerns or questions, or if you would prefer not to have a speech pathologist involved in your visit, please contact our Clinic Coordinator at [phone], at least 24 hours prior to  your appointment.              Further instructions from your care team         [facility], [place]  Same-Day Surgery   Adult Discharge Orders & Instructions     For 24 hours after surgery    1. Get plenty of rest.  A responsible adult must stay with you for at least 24 hours after you leave the hospital.   2. Do not drive or use heavy equipment.  If you have weakness or tingling, don't drive or use heavy equipment until this feeling goes away.  3. Do not drink alcohol.  4. Avoid strenuous or risky activities.  Ask for help when climbing stairs.   5. You may feel lightheaded.  IF so, sit for a few minutes before standing.  Have someone help you get up.   6. If you have nausea (feel sick to your stomach): Drink only clear liquids such as apple juice, ginger ale, broth or 7-Up.  Rest may also help.  Be sure to drink enough fluids.  Move to a regular diet as you feel able.  7. You may have a slight fever. Call the doctor if your fever is over 100 F (37.7 C) (taken under the tongue) or lasts longer than 24 hours.  8. You may have a dry mouth, a sore throat, muscle aches or trouble sleeping.  These should go away after 24 hours.  9. Do not make important or legal decisions.   Call your doctor for any of the followin.  Signs of infection (fever, growing tenderness at the surgery site, a large amount of drainage or bleeding, severe pain, foul-smelling drainage, redness, swelling).    2. It has been over 8 to 10 hours since surgery and you are still not able to urinate (pass water).    3.  Headache for over 24 hours.    To contact a doctor, call Dr Jordan's office at [phone] or:        [phone] and ask for the resident on call for Laser ENT  (answered 24 hours a day)      Emergency Department:    [facility]: [phone]       (TTY for hearing impaired: [phone])        Pending Results     Date and Time Order Name Status Description    2018 1253 XR Chest Port 1  "View In process     2018 1146 Surgical pathology exam In process     2018 1031 Fungus Culture, non-blood Preliminary     2018 1031 Fluid Culture Aerobic Bacterial Preliminary             Admission Information     Date & Time Provider Department Dept. Phone    2018 Marcelina Jordan MD Same Day Surgery [facility] [phone]      Your Vitals Were     Blood Pressure Pulse Temperature Respirations Height Weight    148/86 78 97.6  F (36.4  C) (Oral) 18 1.55 m (5' 1.02\") 79.9 kg (176 lb 2.4 oz)    Pulse Oximetry BMI (Body Mass Index)                100% 33.26 kg/m2          MyChart Information     [employer] lets you send messages to your doctor, view your test results, renew your prescriptions, schedule appointments and more. To sign up, go to [email or web address] . Click on \"Log in\" on the left side of the screen, which will take you to the Welcome page. Then click on \"Sign up Now\" on the right side of the page.     You will be asked to enter the access code listed below, as well as some personal information. Please follow the directions to create your username and password.     Your access code is: [email or web address]  Expires: 4/10/2018  6:30 AM     Your access code will  in 90 days. If you need help or a new code, please call your [facility] clinic or [phone].        Care EveryWhere ID     This is your Care EveryWhere ID. This could be used by other organizations to access your [facility] medical records  [id number]        Equal Access to Services     [facility] : Hadii rogerio baker Soregi, waaxda luqadaha, qaybta kaalmada mayi tatum. So [facility] [phone].    ATENCIÓN: Si habla español, tiene a apple disposición servicios gratuitos de asistencia lingüística. Llame al [phone].    We comply with applicable federal civil rights laws and [place] laws. We do not discriminate on the basis of race, color, national origin, age, disability, " sex, sexual orientation, or gender identity.               Review of your medicines      START taking        Dose / Directions    ondansetron 8 MG tablet   Commonly known as:  ZOFRAN   Used for:  Subglottic stenosis        Dose:  4 mg   Take 0.5 tablets (4 mg) by mouth every 8 hours as needed for nausea   Quantity:  9 tablet   Refills:  0       oxyCODONE IR 5 MG tablet   Commonly known as:  ROXICODONE   Used for:  Subglottic stenosis        Dose:  5-10 mg   Take 1-2 tablets (5-10 mg) by mouth every 3 hours as needed for pain or other (Moderate to Severe)   Quantity:  10 tablet   Refills:  0       senna-docusate 8.6-50 MG per tablet   Commonly known as:  SENOKOT-S;PERICOLACE   Used for:  Subglottic stenosis        Dose:  1-2 tablet   Take 1-2 tablets by mouth 2 times daily Take while on oral narcotics to prevent or treat constipation.   Quantity:  30 tablet   Refills:  0         CONTINUE these medicines which may have CHANGED, or have new prescriptions. If we are uncertain of the size of tablets/capsules you have at home, strength may be listed as something that might have changed.        Dose / Directions    * BACTRIM 400-80 MG per tablet   This may have changed:  Another medication with the same name was added. Make sure you understand how and when to take each.   Generic drug:  sulfamethoxazole-trimethoprim        Dose:  1 tablet   Take 1 tablet by mouth every morning   Refills:  0       * sulfamethoxazole-trimethoprim 800-160 MG per tablet   Commonly known as:  BACTRIM DS/SEPTRA DS   This may have changed:  You were already taking a medication with the same name, and this prescription was added. Make sure you understand how and when to take each.   Used for:  Subglottic stenosis        Dose:  1 tablet   Take 1 tablet by mouth 2 times daily for 7 days   Quantity:  14 tablet   Refills:  0       * Notice:  This list has 2 medication(s) that are the same as other medications prescribed for you. Read the directions  carefully, and ask your doctor or other care provider to review them with you.      CONTINUE these medicines which have NOT CHANGED        Dose / Directions    calcium carbonate-vitamin D 600-200 MG-UNIT Tabs        Take by mouth every morning   Refills:  0       cetirizine 10 MG tablet   Commonly known as:  zyrTEC        Dose:  10 mg   Take 10 mg by mouth At Bedtime   Refills:  0       DAILY MULTIVITAMIN PO        Take by mouth daily (with lunch)   Refills:  0       dextromethorphan-guaiFENesin  MG per 12 hr tablet   Commonly known as:  MUCINEX DM        Dose:  1 tablet   Take 1 tablet by mouth as needed   Refills:  0       DUONEB 0.5-2.5 (3) MG/3ML neb solution   Generic drug:  ipratropium - albuterol 0.5 mg/2.5 mg/3 mL        Dose:  1 ampule   Take 1 ampule by nebulization every 6 hours as needed   Refills:  0       EVENING PRIMROSE OIL PO        Take by mouth daily (with lunch)   Refills:  0       folic acid 1 MG tablet   Commonly known as:  FOLVITE        Dose:  1 mg   Take 1 mg by mouth every morning   Refills:  0       levothyroxine 50 MCG tablet   Commonly known as:  SYNTHROID/LEVOTHROID        Dose:  50 mcg   Take 50 mcg by mouth every morning   Refills:  0       Magnesium 400 MG Tabs        Dose:  400 mg   Take 400 mg by mouth At Bedtime   Refills:  0       Melatonin 10 MG Tabs tablet        Dose:  10 mg   Take 10 mg by mouth At Bedtime   Refills:  0       methotrexate 2.5 MG tablet CHEMO        Dose:  15 mg   Take 15 mg by mouth once a week ON FRIDAY   Refills:  0       omeprazole 20 MG tablet        Dose:  40 mg   Take 40 mg by mouth daily (with lunch)   Refills:  0       predniSONE 20 MG tablet   Commonly known as:  DELTASONE        Dose:  2.5 mg   Take 2.5 mg by mouth every morning   Refills:  0       TRAZODONE HCL PO        Dose:  50 mg   Take 50 mg by mouth At Bedtime   Refills:  0            Where to get your medicines      These medications were sent to [facility] [address] [place] [address]  - [address]  [address] [address]     Phone:  [phone]     ondansetron 8 MG tablet    senna-docusate 8.6-50 MG per tablet    sulfamethoxazole-trimethoprim 800-160 MG per tablet         Some of these will need a paper prescription and others can be bought over the counter. Ask your nurse if you have questions.     Bring a paper prescription for each of these medications     oxyCODONE IR 5 MG tablet                Protect others around you: Learn how to safely use, store and throw away your medicines at www.disposemymeds.org.        ANTIBIOTIC INSTRUCTION     You've Been Prescribed an Antibiotic - Now What?  Your healthcare team thinks that you or your loved one might have an infection. Some infections can be treated with antibiotics, which are powerful, life-saving drugs. Like all medications, antibiotics have side effects and should only be used when necessary. There are some important things you should know about your antibiotic treatment.      Your healthcare team may run tests before you start taking an antibiotic.    Your team may take samples (e.g., from your blood, urine or other areas) to run tests to look for bacteria. These test can be important to determine if you need an antibiotic at all and, if you do, which antibiotic will work best.      Within a few days, your healthcare team might change or even stop your antibiotic.    Your team may start you on an antibiotic while they are working to find out what is making you sick.    Your team might change your antibiotic because test results show that a different antibiotic would be better to treat your infection.    In some cases, once your team has more information, they learn that you do not need an antibiotic at all. They may find out that you don't have an infection, or that the antibiotic you're taking won't work against your infection. For example, an infection caused by a virus can't be treated with antibiotics.  Staying on an antibiotic when you don't need it is more likely to be harmful than helpful.      You may experience side effects from your antibiotic.    Like all medications, antibiotics have side effects. Some of these can be serious.    Let you healthcare team know if you have any known allergies when you are admitted to the hospital.    One significant side effect of nearly all antibiotics is the risk of severe and sometimes deadly diarrhea caused by Clostridium difficile (C. Difficile). This occurs when a person takes antibiotics because some good germs are destroyed. Antibiotic use allows C. diificile to take over, putting patients at high risk for this serious infection.    As a patient or caregiver, it is important to understand your or your loved one's antibiotic treatment. It is especially important for caregivers to speak up when patients can't speak for themselves. Here are some important questions to ask your healthcare team.    What infection is this antibiotic treating and how do you know I have that infection?    What side effects might occur from this antibiotic?    How long will I need to take this antibiotic?    Is it safe to take this antibiotic with other medications or supplements (e.g., vitamins) that I am taking?     Are there any special directions I need to know about taking this antibiotic? For example, should I take it with food?    How will I be monitored to know whether my infection is responding to the antibiotic?    What tests may help to make sure the right antibiotic is prescribed for me?      Information provided by:  www.cdc.gov/getsmart  U.S. Department of Health and Human Services  Centers for disease Control and Prevention  National Center for Emerging and Zoonotic Infectious Diseases  Division of Healthcare Quality Promotion        Information about OPIOIDS     PRESCRIPTION OPIOIDS: WHAT YOU NEED TO KNOW    Prescription opioids can be used to help relieve moderate to severe  pain and are often prescribed following a surgery or injury, or for certain health conditions. These medications can be an important part of treatment but also come with serious risks. It is important to work with your health care provider to make sure you are getting the safest, most effective care.    WHAT ARE THE RISKS AND SIDE EFFECTS OF OPIOID USE?  Prescription opioids carry serious risks of addiction and overdose, especially with prolonged use. An opioid overdose, often marked by slowed breathing can cause sudden death. The use of prescription opioids can have a number of side effects as well, even when taken as directed:      Tolerance - meaning you might need to take more of a medication for the same pain relief    Physical dependence - meaning you have symptoms of withdrawal when a medication is stopped    Increased sensitivity to pain    Constipation    Nausea, vomiting, and dry mouth    Sleepiness and dizziness    Confusion    Depression    Low levels of testosterone that can result in lower sex drive, energy, and strength    Itching and sweating    RISKS ARE GREATER WITH:    History of drug misuse, substance use disorder, or overdose    Mental health conditions (such as depression or anxiety)    Sleep apnea    Older age (65 years or older)    Pregnancy    Avoid alcohol while taking prescription opioids.   Also, unless specifically advised by your health care provider, medications to avoid include:    Benzodiazepines (such as Xanax or Valium)    Muscle relaxants (such as Soma or Flexeril)    Hypnotics (such as Ambien or Lunesta)    Other prescription opioids    KNOW YOUR OPTIONS:  Talk to your health care provider about ways to manage your pain that do not involve prescription opioids. Some of these options may actually work better and have fewer risks and side effects:    Pain relievers such as acetaminophen, ibuprofen, and naproxen    Some medications that are also used for depression or  seizures    Physical therapy and exercise    Cognitive behavioral therapy, a psychological, goal-directed approach, in which patients learn how to modify physical, behavioral, and emotional triggers of pain and stress    IF YOU ARE PRESCRIBED OPIOIDS FOR PAIN:    Never take opioids in greater amounts or more often than prescribed    Follow up with your primary health care provider and work together to create a plan on how to manage your pain.    Talk about ways to help manage your pain that do not involve prescription opioids    Talk about all concerns and side effects    Help prevent misuse and abuse    Never sell or share prescription opioids    Never use another person's prescription opioids    Store prescription opioids in a secure place and out of reach of others (this may include visitors, children, friends, and family)    Visit www.cdc.gov/drugoverdose to learn about risks of opioid abuse and overdose    If you believe you may be struggling with addiction, tell your health care provider and ask for guidance or call [facility]'s National Helpline at 6-219-270-HELP    LEARN MORE / www.cdc.gov/drugoverdose/prescribing/guideline.html    Safely dispose of unused prescription opioids: Find your local drug take-back programs and more information about the importance of safe disposal at www.doseofreality.mn.gov             Medication List: This is a list of all your medications and when to take them. Check marks below indicate your daily home schedule. Keep this list as a reference.      Medications           Morning Afternoon Evening Bedtime As Needed    * BACTRIM 400-80 MG per tablet   Take 1 tablet by mouth every morning   Generic drug:  sulfamethoxazole-trimethoprim                                * sulfamethoxazole-trimethoprim 800-160 MG per tablet   Commonly known as:  BACTRIM DS/SEPTRA DS   Take 1 tablet by mouth 2 times daily for 7 days                                calcium carbonate-vitamin D 600-200 MG-UNIT  Tabs   Take by mouth every morning                                cetirizine 10 MG tablet   Commonly known as:  zyrTEC   Take 10 mg by mouth At Bedtime                                DAILY MULTIVITAMIN PO   Take by mouth daily (with lunch)                                dextromethorphan-guaiFENesin  MG per 12 hr tablet   Commonly known as:  MUCINEX DM   Take 1 tablet by mouth as needed                                DUONEB 0.5-2.5 (3) MG/3ML neb solution   Take 1 ampule by nebulization every 6 hours as needed   Generic drug:  ipratropium - albuterol 0.5 mg/2.5 mg/3 mL                                EVENING PRIMROSE OIL PO   Take by mouth daily (with lunch)                                folic acid 1 MG tablet   Commonly known as:  FOLVITE   Take 1 mg by mouth every morning                                levothyroxine 50 MCG tablet   Commonly known as:  SYNTHROID/LEVOTHROID   Take 50 mcg by mouth every morning                                Magnesium 400 MG Tabs   Take 400 mg by mouth At Bedtime                                Melatonin 10 MG Tabs tablet   Take 10 mg by mouth At Bedtime                                methotrexate 2.5 MG tablet CHEMO   Take 15 mg by mouth once a week ON FRIDAY                                omeprazole 20 MG tablet   Take 40 mg by mouth daily (with lunch)                                ondansetron 8 MG tablet   Commonly known as:  ZOFRAN   Take 0.5 tablets (4 mg) by mouth every 8 hours as needed for nausea                                oxyCODONE IR 5 MG tablet   Commonly known as:  ROXICODONE   Take 1-2 tablets (5-10 mg) by mouth every 3 hours as needed for pain or other (Moderate to Severe)                                predniSONE 20 MG tablet   Commonly known as:  DELTASONE   Take 2.5 mg by mouth every morning                                senna-docusate 8.6-50 MG per tablet   Commonly known as:  SENOKOT-S;PERICOLACE   Take 1-2 tablets by mouth 2 times daily Take while on  oral narcotics to prevent or treat constipation.                                TRAZODONE HCL PO   Take 50 mg by mouth At Bedtime                                * Notice:  This list has 2 medication(s) that are the same as other medications prescribed for you. Read the directions carefully, and ask your doctor or other care provider to review them with you.

## 2018-02-01 NOTE — DISCHARGE INSTRUCTIONS
[facility]  Same-Day Surgery   Adult Discharge Orders & Instructions     For 24 hours after surgery    1. Get plenty of rest.  A responsible adult must stay with you for at least 24 hours after you leave the hospital.   2. Do not drive or use heavy equipment.  If you have weakness or tingling, don't drive or use heavy equipment until this feeling goes away.  3. Do not drink alcohol.  4. Avoid strenuous or risky activities.  Ask for help when climbing stairs.   5. You may feel lightheaded.  IF so, sit for a few minutes before standing.  Have someone help you get up.   6. If you have nausea (feel sick to your stomach): Drink only clear liquids such as apple juice, ginger ale, broth or 7-Up.  Rest may also help.  Be sure to drink enough fluids.  Move to a regular diet as you feel able.  7. You may have a slight fever. Call the doctor if your fever is over 100 F (37.7 C) (taken under the tongue) or lasts longer than 24 hours.  8. You may have a dry mouth, a sore throat, muscle aches or trouble sleeping.  These should go away after 24 hours.  9. Do not make important or legal decisions.   Call your doctor for any of the followin.  Signs of infection (fever, growing tenderness at the surgery site, a large amount of drainage or bleeding, severe pain, foul-smelling drainage, redness, swelling).    2. It has been over 8 to 10 hours since surgery and you are still not able to urinate (pass water).    3.  Headache for over 24 hours.    To contact a doctor, call Dr Jordan's office at [phone] or:        [phone] and ask for the resident on call for Laser ENT  (answered 24 hours a day)      Emergency Department:    [facility]: [phone]       (TTY for hearing impaired: [phone])

## 2018-02-01 NOTE — IP AVS SNAPSHOT
Same Day Surgery [address] [address]    Phone:  [phone]                                       After Visit Summary   2/1/2018    Susan Liu    MRN: [id number]           After Visit Summary Signature Page     I have received my discharge instructions, and my questions have been answered. I have discussed any challenges I see with this plan with the nurse or doctor.    ..........................................................................................................................................  Patient/Patient Representative Signature      ..........................................................................................................................................  Patient Representative Print Name and Relationship to Patient    ..................................................               ................................................  Date                                            Time    ..........................................................................................................................................  Reviewed by Signature/Title    ...................................................              ..............................................  Date                                                            Time

## 2018-02-01 NOTE — OP NOTE
PROCEDURE(S):  Microdirect laryngoscopy  CO2 laser incision of subglottic stenosis under microscopic visualization  Steroid injection to subglottic stenosis under telescopic visualization  Balloon dilation of subglottic stenosis under telescopic visualization  Mitomycin c application to subglottic stenosis under telescopic visualization    PRE-OPERATIVE DIAGNOSIS:   Subglottic Stenosis    POST-OPERATIVE DIAGNOSIS:   As above    SURGEON: Marcelina Jordan MD MPH    ASSISTANT(S): Danielle Cervantes MD    ANAESTHESIA: General, with jet ventilation    INDICATIONS FOR PROCEDURE:   Susan Liu is a 67 year old year old female with a history of Wegener's and dyspnea who was 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  Operative intervention was recommended. After the risks, benefits, and alternatives had been discussed, the patient wished to proceed and presented for the procedure(s) listed above.    DESCRIPTION OF PROCEDURE:   The patient was brought to the operating room and placed supine. A time-out was called to verify patient identity, operative site, and planned procedure. The operative site was prepped in the usual clean fashion. Moist gauze eye pads were placed and secured. A head wrap was placed, and custom molded thermoplastic dental guards were placed over the gingiva. General anesthesia was induced with mask ventilation by Anesthesia. Direct laryngoscopy was then performed with an Ossoff-Pilling laryngoscope, which was placed in suspension. Jet ventilation was established and good chest rise was observed. The vocal folds were examined with 0 degree telescope and the area of stenosis was noted to begin 19 mm below the vocal folds with a thickness of approximately 16 mm. The diameter of the stenotic opening was approximately 3 mm. The laryngoscope was slightly advanced to allow improved access to the area of stenosis. Tracheoscopy revealed no additional lesions or areas of narrowing.    The operating microscope  was then brought into position. Laser safety precautions were utilized at all times, including eye protection for the patient and staff, use of wet towels, and holding jet ventilation during laser use. A laser safety checklist was completed by all staff in the room. As needed, moistened cottonoids or laser-safe backstops were used. The Lumenis CO2 Accublade laser was attached to the microscope and used at a depth setting of 1-2 and 8 Bravo. Incisions were made in the most prominent areas of the stenosis, at 12, 2, and 8 o'clock. Jet ventilation was held and resumed as appropriate, with the O2 sats maintained above 94% at all times and above 97% almost exclusively. Cottonoids soaked in 1:10,000 epinephrine were sparingly used to maintain hemostasis. A small tissue biopsy specimen was submitted given the significant inflammation. The operating microscope was then moved out of position.    After the incisions were completed, 1.7 cc of Kenalog-40 was injected into the stenosis with particular attention to the incision sites.    A Cre pulmonary dilating balloon was then used to dilate the stenotic site under telescopic visualization. The balloon was inflated to approximately 5.5 amber, which corresponds to approximately 14 mm diameter, and held for one minute. This was repeated for a total of three times with ventilation in between.    Mitomycin-c was then topically applied under telescopic visualization on a pledget; this was applied three times for 1 min with saline pledget rinses in between.    As needed during the procedure and at the conclusion of the procedure, the operating microscope was moved out of position and the 0 degree rigid endoscope was again used to examine the airway and confirm completion of the stated objectives of the procedure. After cottonoid and sponge counts were confirmed correct, 2 cc of 4% lidocaine were applied transglottically for laryngotracheal anesthesia. The laryngoscope and tooth guards  were removed. The lips, gums, and tongue were examined and no injuries were noted. Care of the patient was returned to Anesthesia.      FINDINGS:   Subglottic stenosis, starting 19 mm below the vocal folds, 16 mm long. Maximal diameter 3 mm.   Good laryngeal exposure.    SPECIMEN(S):   Subglottic stenosis    DRAINS: None    ESTIMATED BLOOD LOSS: Minimal    COMPLICATIONS: None apparent    DISPOSITION: Stable to PACU    ATTENDING PRESENCE STATEMENT:  I was present and participating for the entire procedure from beginning to completion.      Addendum:  Please note that the patient underwent a prior Microdirect laryngoscopy with incision, dilation, steroid injection, and mitomycin application to subglottic stenosis in 2013.

## 2018-02-01 NOTE — ANESTHESIA CARE TRANSFER NOTE
Patient: Susan Liu    Procedure(s):  Microdirect Laryngoscopy With Incision/Dilation Of Subglottic Stenosis, Steroid Injection,  Mitomycin Application With CO2 Laser  - Wound Class: II-Clean Contaminated   - Wound Class: I-Clean    Diagnosis: Subglottic Stenosis, Wegener's Granulomatosis    Diagnosis Additional Information: No value filed.    Anesthesia Type:   No value filed.     Note:  Airway :Face Mask  Patient transferred to:PACU  Comments: vss spont. rr 12-14.  Patent airway.  Handoff Report: Identifed the Patient, Identified the Reponsible Provider, Reviewed the pertinent medical history, Discussed the surgical course, Reviewed Intra-OP anesthesia mangement and issues during anesthesia, Set expectations for post-procedure period and Allowed opportunity for questions and acknowledgement of understanding      Vitals: (Last set prior to Anesthesia Care Transfer)    CRNA VITALS  2/1/2018 1214 - 2/1/2018 1254      2/1/2018             Pulse: 64    SpO2: 100 %    Resp Rate (observed): (!)  2                Electronically Signed By: YAJAIRA Ruvalcaba CRNA  February 1, 2018  12:54 PM

## 2018-02-01 NOTE — OR NURSING
Patient arrived to PACu with oral airway in place. Patient remained lethargic and requiring jaw thrust and chin lift for airway management. Patient became more alert and able to maintain patency of airway. Talkative and able to communicate needs. Mild nausea. Cool cloth to forehead and ice chips. Nausea decreased. Denies pain except sore throat.

## 2018-02-01 NOTE — ANESTHESIA POSTPROCEDURE EVALUATION
Patient: Susan Liu    Procedure(s):  Microdirect Laryngoscopy With Incision/Dilation Of Subglottic Stenosis, Steroid Injection,  Mitomycin Application With CO2 Laser  - Wound Class: II-Clean Contaminated   - Wound Class: I-Clean    Diagnosis:Subglottic Stenosis, Wegener's Granulomatosis    Diagnosis Additional Information: No value filed.    Anesthesia Type:  General    Note:  Anesthesia Post Evaluation    Patient location during evaluation: PACU  Patient participation: Able to fully participate in evaluation  Level of consciousness: awake and alert  Pain management: adequate  Airway patency: patent  Cardiovascular status: acceptable  Respiratory status: acceptable  Hydration status: acceptable  PONV: controlled     Anesthetic complications: None          Last vitals:  Vitals:    02/01/18 0900 02/01/18 1245 02/01/18 1300   BP: (!) 129/93     Pulse: 78     Resp: 16 12 12   Temp: 37  C (98.6  F) 36.1  C (96.9  F)    SpO2: 99% 100%          Electronically Signed By: Lincoln Freeman MD  February 1, 2018  1:21 PM

## 2018-02-02 LAB — COPATH REPORT: NORMAL

## 2018-02-03 LAB
BACTERIA SPEC CULT: ABNORMAL
Lab: ABNORMAL
SPECIMEN SOURCE: ABNORMAL

## 2018-02-03 NOTE — PROGRESS NOTES
Otolaryngology Adult Consultation    Patient: Susan Liu   : 1950     Patient presents with:  Consult:   Chief Complaint   Patient presents with     Consult     Perla's Syndrome         Referring Provider: Referred Self   Consulting Physician:  Beti Bhardwaj MD       Assessment/Plan: Vidoe recording obtained, will facilitate scheduling a visit and procedure with Dr. Jordan.     HPI: Susan Liu is a 67 year old female seen today in the Otolaryngology Clinic for a history of Wegener's granulomatosis.  She is s/p previous dilations with steroid injections, last was .  She is now noting progressive SOB, feels she is ready for another procedure.  She has previously seen Dr. Jordan - so we discussed today that we would prepare to have her meet with Dr. Jordan for repeat procedure.    Current Outpatient Prescriptions on File Prior to Visit:  dextromethorphan-guaiFENesin (MUCINEX DM)  MG per 12 hr tablet Take 1 tablet by mouth as needed    calcium carbonate-vitamin D 600-200 MG-UNIT TABS Take by mouth every morning    cetirizine (ZYRTEC) 10 MG tablet Take 10 mg by mouth At Bedtime    folic acid (FOLVITE) 1 MG tablet Take 1 mg by mouth every morning    ipratropium - albuterol 0.5 mg/2.5 mg/3 mL (DUONEB) 0.5-2.5 (3) MG/3ML nebulization Take 1 ampule by nebulization every 6 hours as needed   levothyroxine (SYNTHROID, LEVOTHROID) 50 MCG tablet Take 50 mcg by mouth every morning    methotrexate 2.5 MG tablet Take 15 mg by mouth once a week ON FRIDAY   Multiple Vitamin (DAILY MULTIVITAMIN PO) Take by mouth daily (with lunch)    omeprazole 20 MG tablet Take 40 mg by mouth daily (with lunch)    predniSONE (DELTASONE) 20 MG tablet Take 2.5 mg by mouth every morning    sulfamethoxazole-trimethoprim (BACTRIM) 400-80 MG per tablet Take 1 tablet by mouth every morning      No current facility-administered medications on file prior to visit.        Allergies   Allergen Reactions     Penicillins Anaphylaxis      Codeine Nausea and Vomiting     Codeine Camsylate Nausea and Vomiting     Tramadol GI Disturbance     Tramadol Hcl        Past Medical History:   Diagnosis Date     Allergic rhinitis      Allergic rhinitis, cause unspecified      Anemia      Anxiety      Balance problem      Bleeding disorder (H)      Chronic sinusitis      Depression      Family history of thyroid problem      Hoarseness      Joint disease      Osteoarthritis      Osteoporosis      Reflux      Sinus problem      Skin disease      Thyroid disease      Tinnitus      Wesley syndrome          Review of Systems   ENT ROS 1/29/2018   Constitutional Unexplained fatigue   Neurology Dizzy spells, Numbness, Unexplained weakness, Headache   Psychology Frequently feeling depressed or sad, Frequently feeling anxious   Eyes Visual loss   Ears, Nose, Throat Hearing loss, Ringing/noise in ears, Nasal congestion or drainage, Trouble swallowing, Hoarseness, Coughing up blood   Cardiopulmonary Cough, Breathing problems, Wheezing   Gastrointestinal/Genitourinary Heartburn/indigestion   Musculoskeletal Sore or stiff joints, Swollen joints, Swollen legs/feet   Allergy/Immunology Skin changes, Rash   Hematologic Easy bruising   Other Rash        14 point ROS neg other than the symptoms noted above.    Physical Exam:    General assessment:  Head and neck exam grossly normal, she is conversant, speaking in full sentences, but does have slightly audible inspiratory stridor and mild hoarseness.    Laryngoscopy  performed to examine the upper airway for pathology, functional or anatomic abnormality.  Verbal consent is obtained.  Topical anesthetic/decongestant solution is applied per patient request.  The flexible endoscope was passed into each nasal cavity separately, and advanced to the larynx.  Findings are as follows:   Nasal passages without abnormality.     Nasopharynx:  Clear, no lesions, crusting or inflammation   Base of tongue, vallecula, epiglottis, aryepiglottic  folds, false vocal folds without  mucosal lesions, masses or anatomic abnormality.   Glottis with normal movement of vocal folds, normal mucosa and no lesions, subglottic larynx visualized and image stream recording performed.   Pyriform sinuses, post-cricoid area, and posterior pharyngeal wall without lesions

## 2018-02-05 ENCOUNTER — TELEPHONE (OUTPATIENT)
Dept: OTOLARYNGOLOGY | Facility: CLINIC | Age: 68
End: 2018-02-05

## 2018-02-05 DIAGNOSIS — A49.9 BACTERIAL INFECTION: Primary | ICD-10-CM

## 2018-02-05 DIAGNOSIS — A49.9 BACTERIAL INFECTION: ICD-10-CM

## 2018-02-05 RX ORDER — CLINDAMYCIN HCL 300 MG
300 CAPSULE ORAL 3 TIMES DAILY
Qty: 21 CAPSULE | Refills: 0 | Status: SHIPPED | OUTPATIENT
Start: 2018-02-05 | End: 2018-02-26

## 2018-02-05 RX ORDER — CLINDAMYCIN HCL 150 MG
150 CAPSULE ORAL 3 TIMES DAILY
Qty: 21 CAPSULE | Refills: 0 | Status: SHIPPED | OUTPATIENT
Start: 2018-02-05 | End: 2018-02-05

## 2018-02-05 NOTE — PROGRESS NOTES
Rx updated for Clindamycin.  Order changed and pharmacy was called and updated.  Pt's daughter was called and updated.    Suzy KRISHNAMURTHYN, RN  [phone]  [facility]   Head & Neck Surgery   2/5/2018 2:11 PM

## 2018-02-26 ENCOUNTER — OFFICE VISIT (OUTPATIENT)
Dept: OTOLARYNGOLOGY | Facility: CLINIC | Age: 68
End: 2018-02-26
Payer: COMMERCIAL

## 2018-02-26 VITALS — BODY MASS INDEX: 33.23 KG/M2 | WEIGHT: 176 LBS | HEIGHT: 61 IN

## 2018-02-26 DIAGNOSIS — A49.9 BACTERIAL INFECTION: ICD-10-CM

## 2018-02-26 DIAGNOSIS — J38.6 SUBGLOTTIC STENOSIS: Primary | ICD-10-CM

## 2018-02-26 RX ORDER — CLINDAMYCIN HCL 300 MG
300 CAPSULE ORAL 3 TIMES DAILY
Qty: 15 CAPSULE | Refills: 0 | Status: SHIPPED | OUTPATIENT
Start: 2018-02-26 | End: 2018-03-03

## 2018-02-26 ASSESSMENT — PAIN SCALES - GENERAL: PAINLEVEL: NO PAIN (0)

## 2018-02-26 NOTE — MR AVS SNAPSHOT
After Visit Summary   2018    Susan Liu    MRN: [id number]           Patient Information     Date Of Birth          1950        Visit Information        Provider Department      2018 10:40 AM Marcelina Jordan MD [facility] Ear Nose and Throat        Today's Diagnoses     Subglottic stenosis    -  1    Bacterial infection          Care Instructions    1.  You were seen in the ENT Clinic today by Dr. Jordan.  If you have any questions or concerns after your appointment, please call [phone].  Press option #1 for scheduling related needs.  Press option #3 for Nurse advice.  2.  Plan is to return to clinic in 2 weeks for Kenalog injection to subglottis.  3. Please use saline nebulizer, Mucinex.  A refill antibiotic has been sent to your preferred pharmacy.      Suzy POLK, RN  [facility]   Head & Neck Surgery               Follow-ups after your visit        Who to contact     Please call your clinic at [phone] to:    Ask questions about your health    Make or cancel appointments    Discuss your medicines    Learn about your test results    Speak to your doctor            Additional Information About Your Visit        [employer] Information     [employer] is an electronic gateway that provides easy, online access to your medical records. With [employer], you can request a clinic appointment, read your test results, renew a prescription or communicate with your care team.     To sign up for [employer] visit the website at [email or web address]   You will be asked to enter the access code listed below, as well as some personal information. Please follow the directions to create your username and password.     Your access code is: [email or web address]  Expires: 4/10/2018  6:30 AM     Your access code will  in 90 days. If you need help or a new code, please contact your [facility] or call [phone] for assistance.        Care  "EveryWhere ID     This is your Care EveryWhere ID. This could be used by other organizations to access your [facility] medical records  [id number]        Your Vitals Were     Height BMI (Body Mass Index)                1.55 m (5' 1.02\") 33.23 kg/m2           Blood Pressure from Last 3 Encounters:   02/01/18 138/89   01/31/18 (!) 168/95   09/12/13 122/62    Weight from Last 3 Encounters:   02/26/18 79.8 kg (176 lb)   02/01/18 79.9 kg (176 lb 2.4 oz)   01/31/18 80.2 kg (176 lb 12.8 oz)              We Performed the Following     IMAGESTREAM RECORDING ORDER     LARYNGOSCOPY FLEX FIBEROPTIC, DIAGNOSTIC          Today's Medication Changes          These changes are accurate as of 2/26/18  1:58 PM.  If you have any questions, ask your nurse or doctor.               Start taking these medicines.        Dose/Directions    clindamycin 300 MG capsule   Commonly known as:  CLEOCIN   Used for:  Bacterial infection   Started by:  Marcelina Jordan MD        Dose:  300 mg   Take 1 capsule (300 mg) by mouth 3 times daily for 5 days   Quantity:  15 capsule   Refills:  0            Where to get your medicines      These medications were sent to [facility] - [place], [place] - [address]  [address] [place] [address]     Phone:  [phone]     clindamycin 300 MG capsule                Primary Care Provider Office Phone # Fax #    Dea Tomasz [phone]       [facility] [address]   [place] [facility] [place] [address]        Equal Access to Services     [facility]: Hadii rogerio ku hadasho Soomaali, waaxda luqadaha, qaybta kaalmada adejoey, mayi wei. So [facility] [phone].    ATENCIÓN: Si habla español, tiene a apple disposición servicios gratuitos de asistencia lingüística. Llame al [phone].    We comply with applicable federal civil rights laws and [place] laws. We do not discriminate on the basis of race, color, national origin, " age, disability, sex, sexual orientation, or gender identity.            Thank you!     Thank you for choosing [facility] EAR NOSE AND THROAT  for your care. Our goal is always to provide you with excellent care. Hearing back from our patients is one way we can continue to improve our services. Please take a few minutes to complete the written survey that you may receive in the mail after your visit with us. Thank you!             Your Updated Medication List - Protect others around you: Learn how to safely use, store and throw away your medicines at www.disposemymeds.org.          This list is accurate as of 2/26/18  1:58 PM.  Always use your most recent med list.                   Brand Name Dispense Instructions for use Diagnosis    BACTRIM 400-80 MG per tablet   Generic drug:  sulfamethoxazole-trimethoprim      Take 1 tablet by mouth every morning        calcium carbonate-vitamin D 600-200 MG-UNIT Tabs      Take by mouth every morning        cetirizine 10 MG tablet    zyrTEC     Take 10 mg by mouth At Bedtime        clindamycin 300 MG capsule    CLEOCIN    15 capsule    Take 1 capsule (300 mg) by mouth 3 times daily for 5 days    Bacterial infection       DAILY MULTIVITAMIN PO      Take by mouth daily (with lunch)        dextromethorphan-guaiFENesin  MG per 12 hr tablet    MUCINEX DM     Take 1 tablet by mouth as needed        DUONEB 0.5-2.5 (3) MG/3ML neb solution   Generic drug:  ipratropium - albuterol 0.5 mg/2.5 mg/3 mL      Take 1 ampule by nebulization every 6 hours as needed        EVENING PRIMROSE OIL PO      Take by mouth daily (with lunch)        folic acid 1 MG tablet    FOLVITE     Take 1 mg by mouth every morning        levothyroxine 50 MCG tablet    SYNTHROID/LEVOTHROID     Take 50 mcg by mouth every morning        Magnesium 400 MG Tabs      Take 400 mg by mouth At Bedtime        Melatonin 10 MG Tabs tablet      Take 10 mg by mouth At Bedtime        methotrexate 2.5 MG tablet CHEMO      Take 15  mg by mouth once a week ON FRIDAY        omeprazole 20 MG tablet      Take 40 mg by mouth daily (with lunch)        ondansetron 8 MG tablet    ZOFRAN    9 tablet    Take 0.5 tablets (4 mg) by mouth every 8 hours as needed for nausea    Subglottic stenosis       oxyCODONE IR 5 MG tablet    ROXICODONE    10 tablet    Take 1-2 tablets (5-10 mg) by mouth every 3 hours as needed for pain or other (Moderate to Severe)    Subglottic stenosis       predniSONE 20 MG tablet    DELTASONE     Take 2.5 mg by mouth every morning        senna-docusate 8.6-50 MG per tablet    SENOKOT-S;PERICOLACE    30 tablet    Take 1-2 tablets by mouth 2 times daily Take while on oral narcotics to prevent or treat constipation.    Subglottic stenosis       TRAZODONE HCL PO      Take 50 mg by mouth At Bedtime

## 2018-02-26 NOTE — PATIENT INSTRUCTIONS
1.  You were seen in the ENT Clinic today by Dr. Jordan.  If you have any questions or concerns after your appointment, please call [phone].  Press option #1 for scheduling related needs.  Press option #3 for Nurse advice.  2.  Plan is to return to clinic in 2 weeks for Kenalog injection to subglottis.  3. Please use saline nebulizer, Mucinex.  A refill antibiotic has been sent to your preferred pharmacy.      Suzy KRISHNAMURTHYN, RN  [facility]   Head & Neck Surgery

## 2018-02-26 NOTE — PROGRESS NOTES
Dear Colleague:  Susan Liu recently returned for follow-up at the [facility]. My clinic note from our visit is enclosed below.     I appreciate the ongoing opportunity to participate in this patient's care.    Please feel free to contact me with any questions.      Sincerely yours,  Marcelina Jordan M.D., M.P.H.  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 She is s/p Microdirect laryngoscopy with incision, dilation, steroid injection of subglottic stenosis on 2/1/18, and previously in 2013.    She reports having had the flu postoperatively. Her breathing is much better. She also notes her voice is much better. She is coughing up thick sticky secretions still, and also notes that she usually takes Mucinex which does help.        MEDICATIONS:     Current Outpatient Prescriptions   Medication Sig Dispense Refill     ondansetron (ZOFRAN) 8 MG tablet Take 0.5 tablets (4 mg) by mouth every 8 hours as needed for nausea 9 tablet 0     Melatonin 10 MG TABS tablet Take 10 mg by mouth At Bedtime       Magnesium 400 MG TABS Take 400 mg by mouth At Bedtime       EVENING PRIMROSE OIL PO Take by mouth daily (with lunch)       TRAZODONE HCL PO Take 50 mg by mouth At Bedtime        dextromethorphan-guaiFENesin (MUCINEX DM)  MG per 12 hr tablet Take 1 tablet by mouth as needed        calcium carbonate-vitamin D 600-200 MG-UNIT TABS Take by mouth every morning        cetirizine (ZYRTEC) 10 MG tablet Take 10 mg by mouth At Bedtime        folic acid (FOLVITE) 1 MG tablet Take 1 mg by mouth every morning        ipratropium - albuterol 0.5 mg/2.5 mg/3 mL (DUONEB) 0.5-2.5 (3) MG/3ML nebulization Take 1 ampule by nebulization every 6 hours  as needed       levothyroxine (SYNTHROID, LEVOTHROID) 50 MCG tablet Take 50 mcg by mouth every morning        methotrexate 2.5 MG tablet Take 15 mg by mouth once a week ON FRIDAY       Multiple Vitamin (DAILY MULTIVITAMIN PO) Take by mouth daily (with lunch)        omeprazole 20 MG tablet Take 40 mg by mouth daily (with lunch)        predniSONE (DELTASONE) 20 MG tablet Take 2.5 mg by mouth every morning        sulfamethoxazole-trimethoprim (BACTRIM) 400-80 MG per tablet Take 1 tablet by mouth every morning        oxyCODONE IR (ROXICODONE) 5 MG tablet Take 1-2 tablets (5-10 mg) by mouth every 3 hours as needed for pain or other (Moderate to Severe) (Patient not taking: Reported on 2/26/2018) 10 tablet 0     senna-docusate (SENOKOT-S;PERICOLACE) 8.6-50 MG per tablet Take 1-2 tablets by mouth 2 times daily Take while on oral narcotics to prevent or treat constipation. (Patient not taking: Reported on 2/26/2018) 30 tablet 0       ALLERGIES:    Allergies   Allergen Reactions     Penicillins Anaphylaxis     Codeine Nausea and Vomiting     Codeine Camsylate Nausea and Vomiting     Tramadol GI Disturbance     Tramadol Hcl        NEW PMH/PSH: None    REVIEW OF SYSTEMS:  The patient completed a comprehensive 11 point review of systems (below), which was reviewed. Positives are as noted below.  Patient Supplied Answers to Review of Systems  [facility] ENT ROS 2/26/2018   Constitutional Appetite change, Unexplained fever or night sweats   Neurology Dizzy spells, Numbness, Headache   Psychology Frequently feeling anxious   Eyes Visual loss   Ears, Nose, Throat Hearing loss, Ringing/noise in ears, Nasal congestion or drainage, Trouble swallowing, Hoarseness   Cardiopulmonary Wheezing   Gastrointestinal/Genitourinary Heartburn/indigestion   Musculoskeletal Sore or stiff joints   Allergy/Immunology Allergies or hay fever   Hematologic Easy bruising   Endocrine Thirst, Heat or cold intolerance   Other Rash       PHYSICAL EXAM:  General: The  patient was alert and conversant, and in no acute distress. Patient accompanied by her daughter.  Resp: Breathing comfortably, no stridor or stertor. Mildly noisy breahing.  Neuro: Symmetric facial function. Other cranial nerve function as documented above.  Psych: Normal affect, pleasant and cooperative.  Voice/speech: Mild dysphonia characterized by roughness.      Intake scores  Last 2 Scores for Patient-Answered VHI Questionnaire  VHI Total Score 1/29/2018 2/26/2018   VHI Total Score 18 26      Last 2 Scores for Patient-Answered EAT Questionnaire  EAT Total Score 1/29/2018 2/26/2018   EAT Total Score 9 21      Last 2 Scores for Patient-Answered CSI Questionnaire  CSI Total Score 1/29/2018 2/26/2018   CSI Total Score 18 18       Procedure:   Flexible fiberoptic laryngoscopy  Indications: This procedure was warranted to evaluate the patient's laryngeal anatomy and function. Risks, benefits, and alternatives were discussed.  Description: After written informed consent was obtained, a time-out was performed to confirm patient identity, procedure, and procedure site. Topical 3% lidocaine with 0.25% phenylephrine was applied to the nasal cavities. I performed the endoscopy and no complications were apparent.  Performed by: Marcelina Jordan MD MPH  SLP: NA  Findings: Normal nasopharynx. Normal base of tongue, valleculae, and epiglottis. The right true vocal fold demonstrated normal mobility. The left true vocal fold demonstrated normal mobility. The medial edges of the vocal folds appeared smooth and straight. No focal mucosal lesions were observed on the true vocal folds. Glissade produced appropriate elongation.  Mucosa of the false vocal folds, aryepiglottic folds, piriform sinuses, and posterior glottis unremarkable. Subglottis wider than pre-operatively; copious thick sticky secretions throughout. Somewhat improved with coughing, but still with limited visualization of lateral subglottis on the right. Diffuse  mild erythema of subglottis as well.               IMPRESSION AND PLAN:   Susan Liu returns with improved airway caliber but has significant thick sticky yellowish secretions. She also reports having had the flu post-operatively. She is interested in a steroid injection in clinic to help keep her airway patent.  Recommendations:  1) Repeat course of clindamycin given culture results from surgery  2) Hydrate, use saline nebs, use Mucinex  3) Return to clinic in ~2 weeks for subglottic steroid injection procedure.    I appreciate the opportunity to participate in the care of this very pleasant patient.

## 2018-03-01 LAB
FUNGUS SPEC CULT: NORMAL
Lab: NORMAL
SPECIMEN SOURCE: NORMAL

## 2018-03-09 ENCOUNTER — OFFICE VISIT (OUTPATIENT)
Dept: OTOLARYNGOLOGY | Facility: CLINIC | Age: 68
End: 2018-03-09
Payer: COMMERCIAL

## 2018-03-09 VITALS — WEIGHT: 181 LBS | BODY MASS INDEX: 35.53 KG/M2 | HEIGHT: 60 IN

## 2018-03-09 DIAGNOSIS — J38.6 SUBGLOTTIC STENOSIS: Primary | ICD-10-CM

## 2018-03-09 DIAGNOSIS — M31.30 WEGENER'S GRANULOMATOSIS: ICD-10-CM

## 2018-03-09 PROBLEM — M81.0 OSTEOPOROSIS: Status: ACTIVE | Noted: 2017-01-20

## 2018-03-09 PROBLEM — M17.0 PRIMARY OSTEOARTHRITIS OF BOTH KNEES: Status: ACTIVE | Noted: 2017-01-20

## 2018-03-09 RX ORDER — TRIAMCINOLONE ACETONIDE 40 MG/ML
INJECTION, SUSPENSION INTRA-ARTICULAR; INTRAMUSCULAR
Qty: 4 ML | Refills: 0
Start: 2018-03-09 | End: 2024-04-22

## 2018-03-09 RX ORDER — LIDOCAINE HYDROCHLORIDE 20 MG/ML
INJECTION, SOLUTION EPIDURAL; INFILTRATION; INTRACAUDAL; PERINEURAL
Start: 2018-03-09 | End: 2024-04-22

## 2018-03-09 RX ORDER — SULFAMETHOXAZOLE AND TRIMETHOPRIM 200; 40 MG/5ML; MG/5ML
10 SUSPENSION ORAL 2 TIMES DAILY
COMMUNITY

## 2018-03-09 ASSESSMENT — PAIN SCALES - GENERAL: PAINLEVEL: NO PAIN (0)

## 2018-03-09 NOTE — NURSING NOTE
Procedure: Flexible transnasal laryngoscopy with endoscopic steroid injection to subglottis   Reason: Subglottic stenosis   PREPROCEDURE:   Yes Patient ID verified with 2 identifiers (name and  or MRN)   Yes: Procedure and site verified with patient/designee (when able)   Yes: Accurate consent documentation in medical record   No: Marking not required. Reason: [ Procedure does not require site marking. ][ Provider is in continuous attendance with the patient from consent through completion of procedure. ][ Marking unable or refused by patient (see scanned diagram).   TIME OUT:   Yes: Time-Out performed immediately prior to starting procedure, including verbal and active participation of all team members addressing:   * Correct patient identity   * Confirmed that the correct side and site are marked   * An accurate procedure consent form   * Agreement on the procedure to be done   * Correct patient position   * Relevant images and results are properly labeled and appropriately displayed   * The need to administer antibiotics or fluids for irrigation purposes during the procedure as applicable   * Safety precations based on patient history or medication use   DURING PROCEDURE: Verification of correct person, site, and procedure occurs any time the responsibility for care of the patient is transferred to another member of the care team.   Suzy Sandoval RN  P Otolaryngology/Head & Neck Surgery

## 2018-03-09 NOTE — NURSING NOTE
"Chief Complaint   Patient presents with     Procedure     Kenalog injection     Height 1.53 m (5' 0.24\"), weight 82.1 kg (181 lb).    Tadeo Santana LPN    "

## 2018-03-09 NOTE — PROGRESS NOTES
PROCEDURE: Flexible fiberoptic transnasal laryngoscopy with steroid injection to subglottis  PREOPERATIVE DIAGNOSIS: Subglottic stenosis  POSTOPERATIVE DIAGNOSIS: Same.   SURGEON: Marcelina Jordan MD.   ASSISTANT: Suzy Sandoval RN.   INDICATIONS: Susan Liu is a pleasant 67 year old female with subglottic stenosis and Wegener's who presented for post-op in-clinic steroid injections. We discussed options for intervention, and the patient opted for an in-clinic steroid injection after hearing about the risks, benefits, and alternatives. The injection was performed under fiberoptic visualization, which was necessary to confirm appropriate placement of the injectate.  FINDINGS: Mild subglottic stenosis with associated inflammation. A total of ~1.6 cc of Kenalog-40 was injected into the subglottis, with an additional ~1.0 cc that appeared to extrude. Dramatically improved crusting compared to last visit.  DESCRIPTION OF PROCEDURE: After written informed consent was obtained, a time-out was performed to confirm patient identity, procedure, and procedure site. Two-three atomizer puffs of topical 3% lidocaine with 0.25% phenylephrine was applied to the patient's oropharynx and nasal cavities bilaterally, followed by temporary placement of saturated cottonoids to further ensure topical anesthesia. The transnasal flexible laryngoscope was then introduced. To achieve adequate laryngeal anesthesia, a total of  18 cc of 2% lidocaine was then applied using laryngeal gargle technique. The patient was instructed to cough and expectorate as much of the gargle solution as possible. The 25G endoscopic needle was then introduced through the endoscope sheath. Kenalog-40 was injected as above. Once all areas of prominence had been injected, which was targeted at the anterior and lateral aspects, the procedure was completed. The airway remained patent.   COMPLICATIONS: None apparent.   DISPOSITION: Stable to home.   PLAN: Return in  4-6 weeks for repeat steroid injection.

## 2018-03-09 NOTE — LETTER
3/9/2018       RE: Susan Liu  [address] [place] [address]     Dear Colleague,    Thank you for referring your patient, Susan Liu, to the [facility] EAR NOSE AND THROAT at [facility]. Please see a copy of my visit note below.    PROCEDURE: Flexible fiberoptic transnasal laryngoscopy with steroid injection to subglottis  PREOPERATIVE DIAGNOSIS: Subglottic stenosis  POSTOPERATIVE DIAGNOSIS: Same.   SURGEON: Marcelina Jordan MD.   ASSISTANT: Suzy Sandoval RN.   INDICATIONS: Susan Liu is a pleasant 67 year old female with subglottic stenosis and Wegener's who presented for post-op in-clinic steroid injections. We discussed options for intervention, and the patient opted for an in-clinic steroid injection after hearing about the risks, benefits, and alternatives. The injection was performed under fiberoptic visualization, which was necessary to confirm appropriate placement of the injectate.  FINDINGS: Mild subglottic stenosis with associated inflammation. A total of ~1.6 cc of Kenalog-40 was injected into the subglottis, with an additional ~1.0 cc that appeared to extrude. Dramatically improved crusting compared to last visit.  DESCRIPTION OF PROCEDURE: After written informed consent was obtained, a time-out was performed to confirm patient identity, procedure, and procedure site. Two-three atomizer puffs of topical 3% lidocaine with 0.25% phenylephrine was applied to the patient's oropharynx and nasal cavities bilaterally, followed by temporary placement of saturated cottonoids to further ensure topical anesthesia. The transnasal flexible laryngoscope was then introduced. To achieve adequate laryngeal anesthesia, a total of  18 cc of 2% lidocaine was then applied using laryngeal gargle technique. The patient was instructed to cough and expectorate as much of the gargle solution as possible. The 25G endoscopic needle was then introduced through the endoscope  sheath. Kenalog-40 was injected as above. Once all areas of prominence had been injected, which was targeted at the anterior and lateral aspects, the procedure was completed. The airway remained patent.   COMPLICATIONS: None apparent.   DISPOSITION: Stable to home.   PLAN: Return in 4-6 weeks for repeat steroid injection.      Again, thank you for allowing me to participate in the care of your patient.      Sincerely,    Marcelina Jordan MD

## 2018-03-09 NOTE — PATIENT INSTRUCTIONS
1.  You were seen in the ENT Clinic today by Dr. Jordan.  If you have any questions or concerns after your appointment, please call [phone].  Press option #1 for scheduling related needs.  Press option #3 for Nurse advice.  2.  Plan is to return to clinic in 4-6 weeks for follow up Kenalog injection.      Suzy KRISHNAMURTHYN, RN  [facility] ENT   Head & Neck Surgery

## 2018-03-09 NOTE — MR AVS SNAPSHOT
After Visit Summary   3/9/2018    Susan Liu    MRN: [id number]           Patient Information     Date Of Birth          1950        Visit Information        Provider Department      3/9/2018 11:40 AM Marcelina Jordan MD;  ENT PROCEDURE ROOM [facility] Ear Nose and Throat        Today's Diagnoses     Subglottic stenosis    -  1    Wegener's granulomatosis (H)          Care Instructions    1.  You were seen in the ENT Clinic today by Dr. Jordan.  If you have any questions or concerns after your appointment, please call [phone].  Press option #1 for scheduling related needs.  Press option #3 for Nurse advice.  2.  Plan is to return to clinic in 4-6 weeks for follow up Kenalog injection.      Suzy POLK, RN  [facility] ENT   Head & Neck Surgery               Follow-ups after your visit        Your next 10 appointments already scheduled     Apr 23, 2018  1:10 PM CDT   (Arrive by 12:55 PM)   Procedure with Marcelina Jordan MD,  ENT PROCEDURE ROOM   [facility] Ear Nose and Throat [facility] [place])    [address] [place] [id number] [phone]           This is a multi-disciplinary care team visit as patients with your type of problem are usually seen by a team of an MD and a Speech-Language Pathologist (who is a specialist in disorders of the voice, throat, and breathing).  Please plan about 2 hours for your visit, which will likely include Laryngeal Function Studies, a Voice/Swallow/Breathing Evaluation, and an Endoscopic Laryngeal Examination to provide a comprehensive evaluation.  Please check with your insurance company to ensure you are covered for these services. - It is important to know that if you are evaluated and/or treated by both a physician and a speech pathologist during your visit, your billing will reflect the input that you receive from both providers as separate professionals. Although  most insurance plans do cover these services, we encourage you to contact your insurance company prior to your visit to determine whether there are any coverage limitations that might affect you financially. - Billing/procedure codes that are frequently associated with visits to our clinic include (but are not limited to) the ones listed below. Most patients will not need all of these items, but it may be useful to ask your insurance company's patient . 28531: Flexible fiberoptic laryngoscopy, 02782: Laryngoscopy; flexible or rigid fiberoptic, with stroboscopy, 62605: Flexible endoscopic evaluation of swallowing, 18196: Laryngeal function aerodynamic evaluation, 15196: Evaluation of Voice and Resonance, 09853: Speech pathology treatment for voice, speech, communication, 61873: Speech pathology treatment for swallowing/oral function for feeding - If you have any concerns or questions, or if you would prefer not to have a speech pathologist involved in your visit, please contact our Clinic Coordinator at [phone], at least 24 hours prior to your appointment.              Who to contact     Please call your clinic at [phone] to:    Ask questions about your health    Make or cancel appointments    Discuss your medicines    Learn about your test results    Speak to your doctor            Additional Information About Your Visit        RAP Indexhart Information     [employer] is an electronic gateway that provides easy, online access to your medical records. With [employer], you can request a clinic appointment, read your test results, renew a prescription or communicate with your care team.     To sign up for [employer] visit the website at [email or web address]   You will be asked to enter the access code listed below, as well as some personal information. Please follow the directions to create your username and password.     Your access code is: [email or web address]  Expires: 4/10/2018  6:30 AM     Your  "access code will  in 90 days. If you need help or a new code, please contact your [facility] or call [phone] for assistance.        Care EveryWhere ID     This is your Care EveryWhere ID. This could be used by other organizations to access your [place] medical records  [id number]        Your Vitals Were     Height BMI (Body Mass Index)                1.53 m (5' 0.24\") 35.07 kg/m2           Blood Pressure from Last 3 Encounters:   18 138/89   18 (!) 168/95   13 122/62    Weight from Last 3 Encounters:   18 82.1 kg (181 lb)   18 79.8 kg (176 lb)   18 79.9 kg (176 lb 2.4 oz)              We Performed the Following     IMAGESTREAM RECORDING ORDER     LARYNGOSCOPY,INDIRECT+INJECT CORD          Today's Medication Changes          These changes are accurate as of 3/9/18  4:01 PM.  If you have any questions, ask your nurse or doctor.               Start taking these medicines.        Dose/Directions    lidocaine (PF) 2 % Soln injection   Commonly known as:  XYLOCAINE   Used for:  Subglottic stenosis, Wegener's granulomatosis (H)   Started by:  Marcelina Jordan MD        The following medication was given:   MEDICATION:  Lidocaine 2% Soln ROUTE: Laryngeal gargle SITE: Larynx DOSE: 18 mL LOT #: [id number] : [employer]  EXPIRATION DATE:  NDC#: 67304-471-40  Was there drug waste? Yes  Amount of drug waste (mL): 2.  Reason for waste:  Single use vial  Suzy Sandoval RN  3/9/2018 3:04 PM   Refills:  0       triamcinolone acetonide 40 MG/ML injection   Commonly known as:  KENALOG   Used for:  Subglottic stenosis, Wegener's granulomatosis (H)   Started by:  Marcelina Jordan MD        The following medication was given:   MEDICATION:  Kenalog 40 mg/1mL - 4 vials  ROUTE: Injection  SITE: Subglottis DOSE: 2.6 LOT #: [id number] : [employer] EXPIRATION DATE: 2019, 2019  " NDC#: 3273-5551-28  Was there drug waste? Yes  Amount of drug waste (mL): 1.4  Reason for waste:  Single use vial   Suzy Sandoval RN  3/9/2018 3:05 PM   Quantity:  4 mL   Refills:  0         These medicines have changed or have updated prescriptions.        Dose/Directions    sulfamethoxazole-trimethoprim suspension   Commonly known as:  BACTRIM/SEPTRA   This may have changed:  Another medication with the same name was removed. Continue taking this medication, and follow the directions you see here.   Changed by:  Marcelina Jordan MD        Dose:  10 mg/kg/day   Take 10 mg/kg/day by mouth 2 times daily Dose based on TMP component.   Refills:  0         Stop taking these medicines if you haven't already. Please contact your care team if you have questions.     ondansetron 8 MG tablet   Commonly known as:  ZOFRAN   Stopped by:  Marcelina Jordan MD           oxyCODONE IR 5 MG tablet   Commonly known as:  ROXICODONE   Stopped by:  Marcelina Jordan MD           senna-docusate 8.6-50 MG per tablet   Commonly known as:  SENOKOT-S;PERICOLACE   Stopped by:  Marcelina Jordan MD                Where to get your medicines      Some of these will need a paper prescription and others can be bought over the counter.  Ask your nurse if you have questions.     You don't need a prescription for these medications     lidocaine (PF) 2 % Soln injection    triamcinolone acetonide 40 MG/ML injection                Primary Care Provider Office Phone # Fax #    Dea Hermanncer [phone]       [facility] [address] [facility] [address]        Equal Access to Services     [facility] AH: Hadii rogerio ku hadasho Soomaali, waaxda luqadaha, qaybta kaalmada rc, mayi wei. So [facility] [phone].    ATENCIÓN: Si habla español, tiene a apple disposición servicios gratuitos de asistencia lingüística. Llame al [phone].    We comply with  applicable federal civil rights laws and [place] laws. We do not discriminate on the basis of race, color, national origin, age, disability, sex, sexual orientation, or gender identity.            Thank you!     Thank you for choosing [facility] EAR NOSE AND THROAT  for your care. Our goal is always to provide you with excellent care. Hearing back from our patients is one way we can continue to improve our services. Please take a few minutes to complete the written survey that you may receive in the mail after your visit with us. Thank you!             Your Updated Medication List - Protect others around you: Learn how to safely use, store and throw away your medicines at www.disposemymeds.org.          This list is accurate as of 3/9/18  4:01 PM.  Always use your most recent med list.                   Brand Name Dispense Instructions for use Diagnosis    calcium carbonate-vitamin D 600-200 MG-UNIT Tabs      Take by mouth every morning        cetirizine 10 MG tablet    zyrTEC     Take 10 mg by mouth At Bedtime        DAILY MULTIVITAMIN PO      Take by mouth daily (with lunch)        dextromethorphan-guaiFENesin  MG per 12 hr tablet    MUCINEX DM     Take 1 tablet by mouth as needed        DUONEB 0.5-2.5 (3) MG/3ML neb solution   Generic drug:  ipratropium - albuterol 0.5 mg/2.5 mg/3 mL      Take 1 ampule by nebulization every 6 hours as needed        EVENING PRIMROSE OIL PO      Take by mouth daily (with lunch)        folic acid 1 MG tablet    FOLVITE     Take 1 mg by mouth every morning        levothyroxine 50 MCG tablet    SYNTHROID/LEVOTHROID     Take 50 mcg by mouth every morning        lidocaine (PF) 2 % Soln injection    XYLOCAINE     The following medication was given:   MEDICATION:  Lidocaine 2% Soln ROUTE: Laryngeal gargle SITE: Larynx DOSE: 18 mL LOT #: [id number] : [employer]  EXPIRATION DATE: 08/21 NDC#: 76189-079-98  Was there drug waste? Yes  Amount of drug waste (mL): 2.  Reason  for waste:  Single use vial  Suzy Sandoval RN  3/9/2018 3:04 PM    Subglottic stenosis, Wegener's granulomatosis (H)       Magnesium 400 MG Tabs      Take 400 mg by mouth At Bedtime        Melatonin 10 MG Tabs tablet      Take 10 mg by mouth At Bedtime        methotrexate 2.5 MG tablet CHEMO      Take 15 mg by mouth once a week ON FRIDAY        omeprazole 20 MG tablet      Take 40 mg by mouth daily (with lunch)        predniSONE 20 MG tablet    DELTASONE     Take 2.5 mg by mouth every morning        sulfamethoxazole-trimethoprim suspension    BACTRIM/SEPTRA     Take 10 mg/kg/day by mouth 2 times daily Dose based on TMP component.        TRAZODONE HCL PO      Take 50 mg by mouth At Bedtime        triamcinolone acetonide 40 MG/ML injection    KENALOG    4 mL    The following medication was given:   MEDICATION:  Kenalog 40 mg/1mL - 4 vials  ROUTE: Injection  SITE: Subglottis DOSE: 2.6 LOT #: [id number] : [employer] EXPIRATION DATE: June 2019, August 2019  NDC#: 9586-5201-02  Was there drug waste? Yes  Amount of drug waste (mL): 1.4  Reason for waste:  Single use vial   Suzy Sandoval RN  3/9/2018 3:05 PM    Subglottic stenosis, Wegener's granulomatosis (H)

## 2018-04-23 ENCOUNTER — OFFICE VISIT (OUTPATIENT)
Dept: OTOLARYNGOLOGY | Facility: CLINIC | Age: 68
End: 2018-04-23
Payer: COMMERCIAL

## 2018-04-23 DIAGNOSIS — M31.30 WEGENER'S GRANULOMATOSIS: ICD-10-CM

## 2018-04-23 DIAGNOSIS — J38.6 SUBGLOTTIC STENOSIS: Primary | ICD-10-CM

## 2018-04-23 RX ORDER — TRIAMCINOLONE ACETONIDE 40 MG/ML
INJECTION, SUSPENSION INTRA-ARTICULAR; INTRAMUSCULAR
Qty: 4 ML | Refills: 0
Start: 2018-04-23 | End: 2024-04-22

## 2018-04-23 RX ORDER — LIDOCAINE HYDROCHLORIDE 20 MG/ML
INJECTION, SOLUTION EPIDURAL; INFILTRATION; INTRACAUDAL; PERINEURAL
Start: 2018-04-23 | End: 2024-04-22

## 2018-04-23 ASSESSMENT — PAIN SCALES - GENERAL: PAINLEVEL: MODERATE PAIN (5)

## 2018-04-23 NOTE — MR AVS SNAPSHOT
After Visit Summary   2018    Susan Liu    MRN: [id number]           Patient Information     Date Of Birth          1950        Visit Information        Provider Department      2018 1:10 PM Marcelina Jordan MD;  ENT PROCEDURE ROOM [facility]        Today's Diagnoses     Subglottic stenosis    -  1    Wegener's granulomatosis (H)          Care Instructions    1.  You were seen in the ENT Clinic today by Dr. Jordan.  If you have any questions or concerns after your appointment, please call [phone].  Press option #1 for scheduling related needs.  Press option #3 for Nurse advice.  2.  Plan is to return to clinic in 2-3 months for follow up exam.     Suzy POLK, RN  [facility] ENT   Head & Neck Surgery               Follow-ups after your visit        Who to contact     Please call your clinic at [phone] to:    Ask questions about your health    Make or cancel appointments    Discuss your medicines    Learn about your test results    Speak to your doctor            Additional Information About Your Visit        [facility] [facility] [employer] Information     [employer] is an electronic gateway that provides easy, online access to your medical records. With [employer], you can request a clinic appointment, read your test results, renew a prescription or communicate with your care team.     To sign up for [employer] visit the website at [email or web address]   You will be asked to enter the access code listed below, as well as some personal information. Please follow the directions to create your username and password.     Your access code is: [email or web address]  Expires: 2018  7:24 PM     Your access code will  in 90 days. If you need help or a new code, please contact your [facility] or call [phone] for assistance.        Care EveryWhere ID     This is your Care EveryWhere ID. This could be used by other  organizations to access your [place] medical records  [id number]         Blood Pressure from Last 3 Encounters:   02/01/18 138/89   01/31/18 (!) 168/95   09/12/13 122/62    Weight from Last 3 Encounters:   03/09/18 82.1 kg (181 lb)   02/26/18 79.8 kg (176 lb)   02/01/18 79.9 kg (176 lb 2.4 oz)              We Performed the Following     IMAGESTREAM RECORDING ORDER     LARYNGOSCOPY,INDIRECT+INJECT CORD          Today's Medication Changes          These changes are accurate as of 4/23/18 11:59 PM.  If you have any questions, ask your nurse or doctor.               These medicines have changed or have updated prescriptions.        Dose/Directions    * lidocaine (PF) 2 % Soln injection   Commonly known as:  XYLOCAINE   This may have changed:  Another medication with the same name was added. Make sure you understand how and when to take each.   Used for:  Subglottic stenosis, Wegener's granulomatosis (H)   Changed by:  Marcelina Jordan MD        The following medication was given:   MEDICATION:  Lidocaine 2% Soln ROUTE: Laryngeal gargle SITE: Larynx DOSE: 18 mL LOT #: [id number] : [employer]  EXPIRATION DATE: 08/21 NDC#: 56365-056-02  Was there drug waste? Yes  Amount of drug waste (mL): 2.  Reason for waste:  Single use vial  Suzy Sandoval RN  3/9/2018 3:04 PM   Refills:  0       * lidocaine (PF) 2 % Soln injection   Commonly known as:  XYLOCAINE   This may have changed:  You were already taking a medication with the same name, and this prescription was added. Make sure you understand how and when to take each.   Used for:  Subglottic stenosis, Wegener's granulomatosis (H)   Changed by:  Marcelina Jordan MD        The following medication was given:   MEDICATION:  Lidocaine 2% Soln ROUTE: Laryngeal gargle SITE: Larynx DOSE: 20 mL LOT #: [id number] : [employer]   EXPIRATION DATE: 5/1/2018 NDC#: 4921-6532-64  Was there drug waste? No  Suzy Sandoval RN  4/23/2018 1:42 PM    Refills:  0       * triamcinolone acetonide 40 MG/ML injection   Commonly known as:  KENALOG   This may have changed:  Another medication with the same name was added. Make sure you understand how and when to take each.   Used for:  Subglottic stenosis, Wegener's granulomatosis (H)   Changed by:  Marcelina Jordan MD        The following medication was given:   MEDICATION:  Kenalog 40 mg/1mL - 4 vials  ROUTE: Injection  SITE: Subglottis DOSE: 2.6 LOT #: [id number] : [employer] [facility] EXPIRATION DATE: June 2019, August 2019  NDC#: 5662-7403-22  Was there drug waste? Yes  Amount of drug waste (mL): 1.4  Reason for waste:  Single use vial   Suzy Sandoval RN  3/9/2018 3:05 PM   Quantity:  4 mL   Refills:  0       * triamcinolone acetonide 40 MG/ML injection   Commonly known as:  KENALOG   This may have changed:  You were already taking a medication with the same name, and this prescription was added. Make sure you understand how and when to take each.   Used for:  Subglottic stenosis, Wegener's granulomatosis (H)   Changed by:  Marcelina Jordan MD        The following medication was given:   MEDICATION:  Kenalog 40 mg/1mL ROUTE: Injection  SITE: Subglottis DOSE: 1.4 mL LOT #: [id number] (4 vials) : [facility] EXPIRATION DATE: 12/2019 NDC#: [address]-5  Was there drug waste? Yes  Amount of drug waste (mL): 2.6  Reason for waste:  Single use vial   Suzy Sandoval RN  4/23/2018 1:40 PM   Quantity:  4 mL   Refills:  0       * Notice:  This list has 4 medication(s) that are the same as other medications prescribed for you. Read the directions carefully, and ask your doctor or other care provider to review them with you.         Where to get your medicines      Some of these will need a paper prescription and others can be bought over the counter.  Ask your nurse if you have questions.     You don't need a prescription for these medications     lidocaine  (PF) 2 % Soln injection    triamcinolone acetonide 40 MG/ML injection                Primary Care Provider Office Phone # Fax #    Dea Tolbert [phone]       [facility] [address]  [place] [address]        Equal Access to Services     TEN HOOKS : Hadii aad ku hadasho Soomaali, waaxda luqadaha, qaybta kaalmada adeegyada, waxay idiin hayaan adeeg kharash la'aan . So [facility] [phone].    ATENCIÓN: Si habla español, tiene a apple disposición servicios gratuitos de asistencia lingüística. Llame al [phone].    We comply with applicable federal civil rights laws and [place] laws. We do not discriminate on the basis of race, color, national origin, age, disability, sex, sexual orientation, or gender identity.            Thank you!     Thank you for choosing [facility] EAR NOSE [facility] TH[facility]AT  for your care. Our goal is always to provide you with excellent care. Hearing back from our patients is one way we can continue to improve our services. Please take a few minutes to complete the written survey that you may receive in the mail after your visit with us. Thank you!             Your Updated Medication List - Protect others around you: Learn how to safely use, store and throw away your medicines at www.disposemymeds.org.          This list is accurate as of 4/23/18 11:59 PM.  Always use your most recent med list.                   Brand Name Dispense Instructions for use Diagnosis    calcium carbonate-vitamin D 600-200 MG-UNIT Tabs      Take by mouth every morning        cetirizine 10 MG tablet    zyrTEC     Take 10 mg by mouth At Bedtime        DAILY MULTIVITAMIN PO      Take by mouth daily (with lunch)        dextromethorphan-guaiFENesin  MG per 12 hr tablet    MUCINEX DM     Take 1 tablet by mouth as needed        DUONEB 0.5-2.5 (3) MG/3ML neb solution   Generic drug:  ipratropium - albuterol 0.5 mg/2.5 mg/3 mL      Take 1 ampule by nebulization every 6 hours as  needed        EVENING PRIMROSE OIL PO      Take by mouth daily (with lunch)        folic acid 1 MG tablet    FOLVITE     Take 1 mg by mouth every morning        levothyroxine 50 MCG tablet    SYNTHROID/LEVOTHROID     Take 50 mcg by mouth every morning        * lidocaine (PF) 2 % Soln injection    XYLOCAINE     The following medication was given:   MEDICATION:  Lidocaine 2% Soln ROUTE: Laryngeal gargle SITE: Larynx DOSE: 18 mL LOT #: [id number] : [employer]  EXPIRATION DATE: 08/21 NDC#: 38878-045-48  Was there drug waste? Yes  Amount of drug waste (mL): 2.  Reason for waste:  Single use vial  Suzy Sandoval RN  3/9/2018 3:04 PM    Subglottic stenosis, Wegener's granulomatosis (H)       * lidocaine (PF) 2 % Soln injection    XYLOCAINE     The following medication was given:   MEDICATION:  Lidocaine 2% Soln ROUTE: Laryngeal gargle SITE: Larynx DOSE: 20 mL LOT #: [id number] : [facility]   EXPIRATION DATE: 5/1/2018 NDC#: 7566-6761-19  Was there drug waste? No  Suzy Sandoval, RN  4/23/2018 1:42 PM    Subglottic stenosis, Wegener's granulomatosis (H)       Magnesium 400 MG Tabs      Take 400 mg by mouth At Bedtime        Melatonin 10 MG Tabs tablet      Take 10 mg by mouth At Bedtime        methotrexate 2.5 MG tablet CHEMO      Take 15 mg by mouth once a week ON FRIDAY        omeprazole 20 MG tablet      Take 40 mg by mouth daily (with lunch)        predniSONE 20 MG tablet    DELTASONE     Take 2.5 mg by mouth every morning        sulfamethoxazole-trimethoprim suspension    BACTRIM/SEPTRA     Take 10 mg/kg/day by mouth 2 times daily Dose based on TMP component.        TRAZODONE HCL PO      Take 50 mg by mouth At Bedtime        * triamcinolone acetonide 40 MG/ML injection    KENALOG    4 mL    The following medication was given:   MEDICATION:  Kenalog 40 mg/1mL - 4 vials  ROUTE: Injection  SITE: Subglottis DOSE: 2.6 LOT #: [id number] : [employer] EXPIRATION DATE:  June 2019, August 2019  NDC#: 1397-9495-45  Was there drug waste? Yes  Amount of drug waste (mL): 1.4  Reason for waste:  Single use vial   Suzy Sandoval RN  3/9/2018 3:05 PM    Subglottic stenosis, Wegener's granulomatosis (H)       * triamcinolone acetonide 40 MG/ML injection    KENALOG    4 mL    The following medication was given:   MEDICATION:  Kenalog 40 mg/1mL ROUTE: Injection  SITE: Subglottis DOSE: 1.4 mL LOT #: [id number] (4 vials) : [employer] EXPIRATION DATE: 12/2019 NDC#: [address]-9  Was there drug waste? Yes  Amount of drug waste (mL): 2.6  Reason for waste:  Single use vial   Suyz Sandoval RN  4/23/2018 1:40 PM    Subglottic stenosis, Wegener's granulomatosis (H)       * Notice:  This list has 4 medication(s) that are the same as other medications prescribed for you. Read the directions carefully, and ask your doctor or other care provider to review them with you.

## 2018-04-23 NOTE — PROGRESS NOTES
PROCEDURE: Flexible fiberoptic transnasal laryngoscopy with steroid injection to subglottis  PREOPERATIVE DIAGNOSIS: Subglottic stenosis  POSTOPERATIVE DIAGNOSIS: Same.   SURGEON: Marcelina Jordan MD.   ASSISTANT: Suzy Sandoval RN.   INDICATIONS: Susan Liu is a pleasant 67 year old female with Wegeners and subglottic stenosis who presented with persistent subglottic inflammation. She felt that the prior steroid injection helped a lot and is interested in additional steroids to keep her airway open. We discussed options for intervention, and the patient opted for an in-clinic steroid injection after hearing about the risks, benefits, and alternatives. The injection was performed under fiberoptic visualization, which was necessary to confirm appropriate placement of the injectate.  FINDINGS: Mild subglottic stenosis with associated inflammation. A total of ~0.8 cc of Kenalog-40 was injected into the subglottis, with an additional ~0.6 cc that appeared to extrude.  DESCRIPTION OF PROCEDURE: After written informed consent was obtained, a time-out was performed to confirm patient identity, procedure, and procedure site. Two-three atomizer puffs of topical 3% lidocaine with 0.25% phenylephrine was applied to the patient's oropharynx and nasal cavities bilaterally, followed by temporary placement of saturated cottonoids to further ensure topical anesthesia. The transnasal flexible laryngoscope was then introduced. To achieve adequate laryngeal anesthesia, a total of  20 cc of 2% lidocaine was then applied using laryngeal gargle technique. The patient was instructed to cough and expectorate as much of the gargle solution as possible. The 25G endoscopic needle was then introduced through the endoscope sheath. Kenalog-40 was injected as above. Once all areas of prominence had been injected, which was targeted at the anterior, and lateral aspects especially, the procedure was completed. The airway remained patent.    COMPLICATIONS: None apparent.   DISPOSITION: Stable to home.   PLAN: Return in 2-3 months; will call ahead if anticipates needing steroid injection again.

## 2018-04-23 NOTE — NURSING NOTE
Chief Complaint   Patient presents with     Procedure     kenalog injection . Unable to obtain height and weight. Wheel chair bound.       Tadeo Santana LPN

## 2018-04-23 NOTE — LETTER
4/23/2018       RE: Susan Liu  [address] [place] [address]     Dear Colleague,    Thank you for referring your patient, Susan Liu, to the [facility] E[facility] at [facility]. Please see a copy of my visit note below.    PROCEDURE: Flexible fiberoptic transnasal laryngoscopy with steroid injection to subglottis  PREOPERATIVE DIAGNOSIS: Subglottic stenosis  POSTOPERATIVE DIAGNOSIS: Same.   SURGEON: Marcelina Jordan MD.   ASSISTANT: Suzy Sandoval RN.   INDICATIONS: Susan Liu is a pleasant 67 year old female with Wegeners and subglottic stenosis who presented with persistent subglottic inflammation. She felt that the prior steroid injection helped a lot and is interested in additional steroids to keep her airway open. We discussed options for intervention, and the patient opted for an in-clinic steroid injection after hearing about the risks, benefits, and alternatives. The injection was performed under fiberoptic visualization, which was necessary to confirm appropriate placement of the injectate.  FINDINGS: Mild subglottic stenosis with associated inflammation. A total of ~0.8 cc of Kenalog-40 was injected into the subglottis, with an additional ~0.6 cc that appeared to extrude.  DESCRIPTION OF PROCEDURE: After written informed consent was obtained, a time-out was performed to confirm patient identity, procedure, and procedure site. Two-three atomizer puffs of topical 3% lidocaine with 0.25% phenylephrine was applied to the patient's oropharynx and nasal cavities bilaterally, followed by temporary placement of saturated cottonoids to further ensure topical anesthesia. The transnasal flexible laryngoscope was then introduced. To achieve adequate laryngeal anesthesia, a total of  20 cc of 2% lidocaine was then applied using laryngeal gargle technique. The patient was instructed to cough and expectorate as much of the gargle solution as possible. The 25G  endoscopic needle was then introduced through the endoscope sheath. Kenalog-40 was injected as above. Once all areas of prominence had been injected, which was targeted at the anterior, and lateral aspects especially, the procedure was completed. The airway remained patent.   COMPLICATIONS: None apparent.   DISPOSITION: Stable to home.   PLAN: Return in 2-3 months; will call ahead if anticipates needing steroid injection again.      Again, thank you for allowing me to participate in the care of your patient.      Sincerely,    Marcelina Jordan MD

## 2018-04-23 NOTE — PATIENT INSTRUCTIONS
1.  You were seen in the ENT Clinic today by Dr. Jordan.  If you have any questions or concerns after your appointment, please call [phone].  Press option #1 for scheduling related needs.  Press option #3 for Nurse advice.  2.  Plan is to return to clinic in 2-3 months for follow up exam.     Suzy KRISHNAMURTHYN, RN  [facility] ENT   Head & Neck Surgery

## 2023-06-01 ENCOUNTER — TELEPHONE (OUTPATIENT)
Dept: OTOLARYNGOLOGY | Facility: CLINIC | Age: 73
End: 2023-06-01
Payer: COMMERCIAL

## 2023-08-15 NOTE — TELEPHONE ENCOUNTER
FUTURE VISIT INFORMATION      FUTURE VISIT INFORMATION:  Date: 11/13/23  Time: 10:30am  Location: [facility]  REFERRAL INFORMATION:  Referring provider:    Referring providers clinic:    Reason for visit/diagnosis  r/s from 8/14/23. New per pt, Wegener's Syndrome, per pt. and subglottic stenosis. Last seen 2018. Recs in [facility]. Location verified.     RECORDS REQUESTED FROM:       Clinic name Comments Records Status Imaging Status    ent 4/23/18, 3/9/18 - ov with Marcelina Jordan MD     2/1/18- microdirect laryngoscopy  [facility]     imaging 2/1/18- xr chest  9/12/13- xr chest  [facility] [place] [facility] - Ov with Dea Tolbert PA-C  3/16/23, 9/13/22 - ov with Abilio Greene MD     4/23/18-OV with Alexsandra Bansal PA-C     More Records until 9/10/13 Care everywhere

## 2023-09-11 NOTE — LETTER
2/26/2018      RE: Susan Liu  [address] [place] [address]       Dear Colleague:  Susan Liu recently returned for follow-up at the [facility]. My clinic note from our visit is enclosed below.     I appreciate the ongoing opportunity to participate in this patient's care.    Please feel free to contact me with any questions.      Sincerely yours,  Marcelina Jordan M.D., M.P.H.  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 She is s/p Microdirect laryngoscopy with incision, dilation, steroid injection of subglottic stenosis on 2/1/18, and previously in 2013.    She reports having had the flu postoperatively. Her breathing is much better. She also notes her voice is much better. She is coughing up thick sticky secretions still, and also notes that she usually takes Mucinex which does help.        MEDICATIONS:     Current Outpatient Prescriptions   Medication Sig Dispense Refill     ondansetron (ZOFRAN) 8 MG tablet Take 0.5 tablets (4 mg) by mouth every 8 hours as needed for nausea 9 tablet 0     Melatonin 10 MG TABS tablet Take 10 mg by mouth At Bedtime       Magnesium 400 MG TABS Take 400 mg by mouth At Bedtime       EVENING PRIMROSE OIL PO Take by mouth daily (with lunch)       TRAZODONE HCL PO Take 50 mg by mouth At Bedtime        dextromethorphan-guaiFENesin (MUCINEX DM)  MG per 12 hr tablet Take 1 tablet by mouth as needed        calcium carbonate-vitamin D 600-200 MG-UNIT TABS Take by mouth every morning        cetirizine (ZYRTEC) 10 MG tablet Take 10 mg by mouth At Bedtime        folic acid (FOLVITE) 1 MG tablet Take 1 mg by mouth every morning        ipratropium - albuterol 0.5 mg/2.5 mg/3 mL (DUONEB)  [facility]   Pediatric Hematology Oncology        Follow-Up Visit Note    Patient Name: Chao Philip  Age/Gender: 14 year old male  Encounter Date: 9/11/2023      Chief Complaint:  Chao Philip is a 14y M with stage 2B Hodgkins Lymphoma, here to begin day 1 cycle 2 ABVE PC, treated according to [facility] study protocol AHOD 0031.    History of Present Illness:   Caho Philip is a 14y M with stage 2B Hodgkins Lymphoma, here to begin day 1 cycle 2 ABVE PC, treated according to [facility] study protocol AHOD 0031.  Last cycle of chemotherapy was complicated by myelosuppression that needed a transfusion.  He was last seen in clinic five days ago.  In the interval, he has been well.  No fevers, no illnesses.  His activity and energy is at his baseline.  He appetite is fine.  He is eating and drinking well.  Denies CINV.  Denies abdominal pain, constipation or diarhea.  Denies new lumps or bumps. Denies chest pain, shortness of breath or difficulties breathing.  Denies paresthesias or foot drop.     He is a Freshman in high school.  School has not been able to a  to visit at the home yet.     Current Treatment Plan:  Protocol: AHOD 0031   Not on study   Cycle:  2  Day: 1    Oncology History:  Diagnosis:  Stage IIB Hodgkin's  Date of Diagnosis:  8/15/23  Presentation:  Chest pain, airway compression, night sweats, weight loss, adenopathy  Staging:  In left cervical, supraclavicular, anterior mediastinum, and paramediastinum.    Bone marrow:   negative  Risk Group:  Intermediate  Treatment:  Per [id number]  Central Line:  Right double lumen PICC by VAD  Adverse Effects:  Intolerance to fosaprepitant.    Past Medical History:  Past Medical History:   Diagnosis Date   • Encounter for routine child health examination without abnormal findings 1/9/2019   • HD (Hodgkin's lymphoma) (CMD) 8/16/2023   • History of COVID-19 8/2/2021 7/3 + test, 7/4 negative test   • Learning difficulty 11/1/2018   • Mediastinal mass  0.5-2.5 (3) MG/3ML nebulization Take 1 ampule by nebulization every 6 hours as needed       levothyroxine (SYNTHROID, LEVOTHROID) 50 MCG tablet Take 50 mcg by mouth every morning        methotrexate 2.5 MG tablet Take 15 mg by mouth once a week ON FRIDAY       Multiple Vitamin (DAILY MULTIVITAMIN PO) Take by mouth daily (with lunch)        omeprazole 20 MG tablet Take 40 mg by mouth daily (with lunch)        predniSONE (DELTASONE) 20 MG tablet Take 2.5 mg by mouth every morning        sulfamethoxazole-trimethoprim (BACTRIM) 400-80 MG per tablet Take 1 tablet by mouth every morning        oxyCODONE IR (ROXICODONE) 5 MG tablet Take 1-2 tablets (5-10 mg) by mouth every 3 hours as needed for pain or other (Moderate to Severe) (Patient not taking: Reported on 2/26/2018) 10 tablet 0     senna-docusate (SENOKOT-S;PERICOLACE) 8.6-50 MG per tablet Take 1-2 tablets by mouth 2 times daily Take while on oral narcotics to prevent or treat constipation. (Patient not taking: Reported on 2/26/2018) 30 tablet 0       ALLERGIES:    Allergies   Allergen Reactions     Penicillins Anaphylaxis     Codeine Nausea and Vomiting     Codeine Camsylate Nausea and Vomiting     Tramadol GI Disturbance     Tramadol Hcl        NEW PMH/PSH: None    REVIEW OF SYSTEMS:  The patient completed a comprehensive 11 point review of systems (below), which was reviewed. Positives are as noted below.  Patient Supplied Answers to Review of Systems  [facility] ENT ROS 2/26/2018   Constitutional Appetite change, Unexplained fever or night sweats   Neurology Dizzy spells, Numbness, Headache   Psychology Frequently feeling anxious   Eyes Visual loss   Ears, Nose, Throat Hearing loss, Ringing/noise in ears, Nasal congestion or drainage, Trouble swallowing, Hoarseness   Cardiopulmonary Wheezing   Gastrointestinal/Genitourinary Heartburn/indigestion   Musculoskeletal Sore or stiff joints   Allergy/Immunology Allergies or hay fever   Hematologic Easy bruising   Endocrine  8/14/2023   • Snoring 1/3/2023        Past Surgical History:  Past Surgical History:   Procedure Laterality Date   • Adenoidectomy     • Tonsillectomy          Family History:  No family history on file.     Social History:  Pediatric History   Patient Parents/Guardians   • SOBEIDA DENIS (Grandparent/Guardian)   • JOSE G DENIS (Grandparent/Guardian)     Other Topics Concern   • Not on file   Social History Narrative   • Not on file       Immunizations:  Immunization History   Administered Date(s) Administered   • COVID Pfizer 12Y+ 05/12/2022   • COVID Pfizer 12Y+ (Requires Dilution) 08/06/2021, 08/27/2021   • DTaP(INFANRIX) 11/18/2010, 08/05/2013   • DTaP/HIB/IPV 2009, 01/08/2010, 05/18/2010   • DTaP/Hep B/IPV 05/18/2010   • DTaP/IPV 08/05/2013   • HPV 9-Valent 09/10/2020, 03/10/2021   • Hep A, Unspecified formulation 05/20/2011, 09/27/2017   • Hep B, adult 2009, 10/12/2010   • Hib (PRP-OMP) 05/18/2010   • Influenza, quadrivalent, preserve-free 09/27/2017, 10/03/2018, 09/10/2020, 12/21/2021, 01/03/2023   • Influenza, seasonal, injectable, trivalent 01/17/2013, 01/13/2014, 11/14/2014   • MMR 11/18/2010, 08/05/2013   • Measles Mumps Rubella Varicella 08/05/2013   • Meningococcal Conjugate MCV4O (Menveo) 09/10/2020   • Pneumococcal Conjugate 13 Valent Vacc (Prevnar 13) 2009, 01/08/2010, 05/18/2010, 11/18/2010, 05/02/2011   • Pneumococcal Conjugate 7 Valent 2009, 01/08/2010, 05/18/2010, 11/18/2010, 05/02/2011   • Polio, INACTIVATED 08/05/2013   • Rotavirus - monovalent 2009, 01/08/2010   • Rotavirus - pentavalent 2009, 01/08/2010, 05/18/2010   • Tdap 09/10/2020   • Varicella 11/18/2010, 08/05/2013   Deferred Date(s) Deferred   • Influenza, quadrivalent, preserve-free 11/28/2022       Review of Systems:  Constitutional: Negative for activity change, chills, fever and irritability.    HENT: Negative for congestion, ear discharge, ear pain, hearing loss, nosebleeds, rhinorrhea,  Thirst, Heat or cold intolerance   Other Rash       PHYSICAL EXAM:  General: The patient was alert and conversant, and in no acute distress. Patient accompanied by her daughter.  Resp: Breathing comfortably, no stridor or stertor. Mildly noisy breahing.  Neuro: Symmetric facial function. Other cranial nerve function as documented above.  Psych: Normal affect, pleasant and cooperative.  Voice/speech: Mild dysphonia characterized by roughness.      Intake scores  Last 2 Scores for Patient-Answered VHI Questionnaire  VHI Total Score 1/29/2018 2/26/2018   VHI Total Score 18 26      Last 2 Scores for Patient-Answered EAT Questionnaire  EAT Total Score 1/29/2018 2/26/2018   EAT Total Score 9 21      Last 2 Scores for Patient-Answered CSI Questionnaire  CSI Total Score 1/29/2018 2/26/2018   CSI Total Score 18 18       Procedure:   Flexible fiberoptic laryngoscopy  Indications: This procedure was warranted to evaluate the patient's laryngeal anatomy and function. Risks, benefits, and alternatives were discussed.  Description: After written informed consent was obtained, a time-out was performed to confirm patient identity, procedure, and procedure site. Topical 3% lidocaine with 0.25% phenylephrine was applied to the nasal cavities. I performed the endoscopy and no complications were apparent.  Performed by: Marcelina Jordan MD MPH  SLP: NA  Findings: Normal nasopharynx. Normal base of tongue, valleculae, and epiglottis. The right true vocal fold demonstrated normal mobility. The left true vocal fold demonstrated normal mobility. The medial edges of the vocal folds appeared smooth and straight. No focal mucosal lesions were observed on the true vocal folds. Glissade produced appropriate elongation.  Mucosa of the false vocal folds, aryepiglottic folds, piriform sinuses, and posterior glottis unremarkable. Subglottis wider than pre-operatively; copious thick sticky secretions throughout. Somewhat improved with coughing, but  sore throat and voice change.    Eyes: Negative for pain, discharge.   Respiratory: Negative for shortness of breath and wheezing.    Cardiovascular: Negative for chest pain and palpitations.   Gastrointestinal: Negative for abdominal pain, blood in stool, constipation, diarrhea, nausea and vomiting.   Endocrine: Negative for cold intolerance, heat intolerance, polydipsia, polyphagia and polyuria.   Genitourinary: Negative for difficulty urinating, dysuria, frequency and hematuria.   Musculoskeletal: Negative for joint swelling and myalgias.   Skin: Negative for color change and rash.   Allergic/Immunologic: Negative for environmental allergies and food allergies.   Neurological: Negative for seizures.   Hematological: Negative for adenopathy. Does not bruise/bleed easily.   Psychiatric/Behavioral: Negative for behavioral problems and sleep disturbance.     Physical Exam    Growth Percentiles:    71 %ile (Z= 0.56) based on [facility] (Boys, 2-20 Years) Stature-for-age data based on Stature recorded on 9/11/2023.   43 %ile (Z= -0.18) based on [facility] (Boys, 2-20 Years) weight-for-age data using vitals from 9/11/2023.   Weight    09/11/23 0803   Weight: 49.8 kg       Body surface area is 1.56 meters squared.  Visit Vitals  BP 99/70 (BP Location: LUE - Left upper extremity, Patient Position: Sitting, Cuff Size: Regular)   Pulse 71   Temp 98.4 °F (36.9 °C) (Oral)   Resp 18   Ht 169 cm   Wt 49.8 kg   BMI 17.44 kg/m²     General:  Alert and oriented, No acute distress.    Eye:  Pupils are equal, round and reactive to light, Extraocular movements are intact, Normal conjunctiva, Vision unchanged, No scleral icterus.  Wears corrective lenses.   HENT:  Normocephalic, Tympanic membranes are clear, Normal hearing, Oral mucosa is moist, No pharyngeal erythema, No oral lesions.    Neck:  Supple, Non-tender, No lymphadenopathy.    Respiratory:  Lungs are clear to auscultation, Respirations are non-labored, Symmetrical chest wall expansion,  No chest wall tenderness.    Cardiovascular:  Normal rate, Regular rhythm, No murmur, Good pulses equal in all extremities, Normal peripheral perfusion, No edema.    Gastrointestinal:  Soft, Non-tender, Non-distended, Normal bowel sounds, No organomegaly, No masses.    Genitourinary:  No costovertebral angle tenderness.    Lymphatics:  No lymphadenopathy neck, axilla, groin, Supraclavicular.    Musculoskeletal     Normal range of motion.     Normal strength.     No tenderness.     No swelling.     No deformity.     Normal gait.     Integumentary:  Warm, Pink, Intact, No pallor, No rash.  Alopecia.  Neurologic:  Alert, Oriented, Normal sensory, Normal motor function, No focal deficits, Cranial Nerves II-XII are grossly intact, Normal deep tendon reflexes.    Cognition and Speech:  Oriented, Speech clear and coherent, Functional cognition intact.    Psychiatric:  Cooperative, Appropriate mood & affect, Normal judgment, Non-suicidal.      Allergies:  ALLERGIES:  Lactase   (food or med), Pollen extract, and Fosaprepitant    Medications:  Current Outpatient Medications   Medication Sig Dispense Refill   • Heparin Sod, Pork, Lock Flush (heparin, porcine,) 10 UNIT/ML injectable solution Inject 3 mLs into the vein as needed (SASH). Indications: Prevention of Closure of Indwelling Catheter     • placed in syringe Misc 1 each with sodium chloride 0.9 % Solution flush Inject 10 mLs into the vein daily. Indications: SASH     • polyethylene glycol (MIRALAX) 17 GM/SCOOP powder Take 17 g by mouth daily. Indications: Constipation Stir and dissolve powder in any 4 to 8 ounces of beverage, then drink. If stools become loose or liquid then transition to daily as needed for constipation. 225 g 3   • Gelatin Capsules, Empty, (Empty Capsule Size 00 Clear) Cap 60 capsules as needed (disguise tast of tablet medicines). 60 capsule 3   • hydrOXYzine (ATARAX) 25 MG tablet Take 1 tablet by mouth every 6 hours as needed for Itching. 60 tablet 3  still with limited visualization of lateral subglottis on the right. Diffuse mild erythema of subglottis as well.               IMPRESSION AND PLAN:   Susan Liu returns with improved airway caliber but has significant thick sticky yellowish secretions. She also reports having had the flu post-operatively. She is interested in a steroid injection in clinic to help keep her airway patent.  Recommendations:  1) Repeat course of clindamycin given culture results from surgery  2) Hydrate, use saline nebs, use Mucinex  3) Return to clinic in ~2 weeks for subglottic steroid injection procedure.    I appreciate the opportunity to participate in the care of this very pleasant patient.            • cetirizine (ZyrTEC) 5 MG chewable tablet Chew 2 tablets by mouth daily as needed for Allergies. Indications: Upper Respiratory Tract Allergy 30 tablet 0   • fluticasone (FLONASE) 50 MCG/ACT nasal spray Spray 1 spray in each nostril daily as needed (congestion). Indications: Allergic Rhinitis 1 each 0   • ondansetron (ZOFRAN) 8 MG tablet Take 1 tablet by mouth every 6 hours as needed for Nausea. 90 tablet 5   • famotidine (PEPCID) 20 MG tablet Take 1 tablet by mouth in the morning and 1 tablet in the evening. 60 tablet 3   • sulfamethoxazole-trimethoprim (Bactrim) 400-80 MG per tablet Take 1.5 tablets by mouth 2 times daily on Saturday and Sunday. 24 tablet 11   • Cholecalciferol (Vitamin D) 50 mcg (2,000 units) tablet Take 50 mcg by mouth daily. Indications: Vitamin D Deficiency     • Glycerin-Hypromellose- (DRY EYE RELIEF DROPS OP) Place 1 drop into both eyes daily as needed (dry eyes).     • albuterol 108 (90 Base) MCG/ACT inhaler Inhale 4 puffs into the lungs every 4 hours as needed for Shortness of Breath or Wheezing. Use with spacer every time 3 each 1   • Spacer/Aero-Holding Chambers (OptiChamber Advantage-Lg Mask) Misc Use with inhaler every time 3 each 1     Current Facility-Administered Medications   Medication Dose Route Frequency Provider Last Rate Last Admin   • sodium chloride (NORMAL SALINE) 0.9 % bolus 500 mL  500 mL Intravenous Once PRN Holley Duval  mL/hr at 09/11/23 0927 500 mL at 09/11/23 0927   • dextrose 5 % / sodium chloride 0.9% infusion  125 mL/m2/hr (Treatment Plan Recorded) Intravenous Continuous Holley Duval CNP       • furosemide (LASIX INJECT) injection 20 mg  20 mg Intravenous Q4H PRN Holley Duval CNP       • heparin (porcine) 10 UNIT/ML lock flush 30 Units  3 mL Intravenous PRN [place] DO       • predniSONE (DELTASONE) tablet 30 mg  30 mg Oral Once Holley Duval CNP       • famotidine (PEPCID) tablet 20 mg  20 mg Oral Once Holley Duval CNP        • SODIUM CHLORIDE 0.9 % BOLUS Pyxis Override            • palonosetron (ALOXI) pediatric syringe 0.25 mg  0.25 mg Intravenous Once Denzel Vicente MD       • vinCRIStine (ONCOVIN) 2.2 mg in sodium chloride 0.9 % 25 mL chemo pediatric IVPB  1.4 mg/m2 (Treatment Plan Recorded) Intravenous Once Holley Duval CNP       • bleomycin (BLEOXANE) 3 units/mL in sodium chloride 0.9% chemo pediatric IV syringe 7.8 Units 2.6 mL total volume  5 Units/m2 (Treatment Plan Recorded) Intravenous Once Holley Duval CNP       • DOXOrubicin (ADRIAMYCIN) 1 mg/mL in sodium chloride 0.9% chemo pediatric IV syringe 39 mg 39 mL total volume  25 mg/m2 (Treatment Plan Recorded) Intravenous Once Holley Duval CNP       • dexrazoxane 390 mg in lactated ringers 150 mL total volume chemo IVPB  250 mg/m2 (Treatment Plan Recorded) Intravenous Once Holley Duval CNP       • etoposide (VEPESID) 194 mg in sodium chloride 0.9 % 500 mL chemo pediatric IVPB  125 mg/m2 (Treatment Plan Recorded) Intravenous Once Holley Duval CNP       • cyclophosphamide (CYTOXAN) 1,240 mg in sodium chloride 0.9 % 100 mL chemo pediatric IVPB  800 mg/m2 (Treatment Plan Recorded) Intravenous Once Holley Duval CNP       • dextrose 5 % / sodium chloride 0.9% infusion  125 mL/m2/hr (Treatment Plan Recorded) Intravenous Continuous Holley Duval CNP           Home Medication Compliance:   Compliance with home medication regimen:Yes  Compliance Comments:no missed doses  Compliance information obtained from:  Grandparents/guardians    Karnofsky/Lansky Performance Status:   100 - fully active, normal     Lab and Imaging Studies:  Component      Latest Ref [facility] [place] 9/11/2023  8:23 AM   WBC      4.2 - 11.0 K/mcL 7.2    RBC      3.90 - 5.30 mil/mcL 4.50    HGB      13.0 - 17.0 g/dL 10.5 (L)    HCT      39.0 - 51.0 % 32.7 (L)    MCV      78.0 - 100.0 fl 72.7 (L)    MCH      26.0 - 34.0 pg 23.3 (L)    MCHC      32.0 - 36.5 g/dL 32.1    RDW-CV      11.0 - 15.0 % 17.7 (H)     RDW-SD      35.0 - 47.0 fL 45.0    PLT      140 - 450 K/mcL 765 (H)    NRBC      <=0 /100 WBC 0    Neutrophil      % 54    LYMPH      % 34    MONO      % 10    EOSIN      % 0    BASO      % 2    Immature Granulocytes      % 0    Analyzer ANC      1.8 - 8.0 K/mcl 3.9    Absolute Neutrophil      1.8 - 8.0 K/mcL 3.9    Absolute Lymph      1.5 - 6.5 K/mcL 2.5    Absolute Mono      0.0 - 0.8 K/mcL 0.7    Absolute Eos      0.0 - 0.5 K/mcL 0.0    Absolute Baso      0.0 - 0.2 K/mcL 0.2    Absolute Immature Granulocytes      0.0 - 0.2 K/mcL 0.0    Sodium      135 - 145 mmol/L 140    Potassium      3.4 - 5.1 mmol/L 3.6    Chloride      97 - 110 mmol/L 109    CO2      21 - 32 mmol/L 24    ANION GAP      7 - 19 mmol/L 11    Glucose      70 - 99 mg/dL 120 (H)    BUN      6 - 20 mg/dL 7    Creatinine      0.38 - 1.15 mg/dL 0.80    Glomerular Filtration Rate --    BUN/CREATININE RATIO      7 - 25  9    CALCIUM      8.0 - 11.0 mg/dL 9.6    TOTAL BILIRUBIN      0.2 - 1.0 mg/dL 0.2    AST/SGOT      10 - 45 Units/L 10    ALT/SGPT      10 - 50 Units/L 18    ALK PHOSPHATASE      110 - 450 Units/L 124    Albumin      3.6 - 5.1 g/dL 3.5 (L)    TOTAL PROTEIN      6.0 - 8.0 g/dL 7.2    GLOBULIN      2.0 - 4.0 g/dL 3.7    A/G Ratio, Serum      1.0 - 2.4  0.9 (L)    PHOSPHORUS      2.9 - 5.4 mg/dL 4.1    MAGNESIUM      1.7 - 2.4 mg/dL 2.1         Assessment/Plan:    Visit Diagnosis:    1. 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 CINV (chemotherapy-induced nausea and vomiting)    3. Encounter for antineoplastic chemotherapy    4. Immunosuppressed due to chemotherapy (CMD)      Chao Philip is a 14y M with stage 2B Hodgkins Lymphoma, here to begin day 1 cycle 2 ABVE PC, treated according to [facility] study protocol AHOD 0031. He is doing clinically well.  Blood counts adequate to proceed with chemotherapy today.  No supportive transfusions indicated.    CINV.  Questionble reaction to fosaprepitant with previous  cycle.  Plan to treat with scheduled aloxi 0.2 mg IV q24h x 3 days.  Atarax and ativan as needed for breakthrough.    Immunosuppression secondary to chemotherapy.  Bactrim po BID qS, S for pjp ppx.  No immunizations while on active chemotherapy except for seasonal influenza vaccine and Covid vaccine.    Plan:  To receive chemotherapy outpatient  Rapid hydration and IVF per chemotherapy protocol  Dexrozoxane 250 mg/m2/dose:  390 mg IV 30 minutes prior to doxorubicin, days 1 and 2  Doxorubicin 25 mg/m2/dose:  39 mg IVP, days 1 and 2  Bleomycin 5 units/m2/dose:  7.8 units IVPB, day 1  VCR 1.4 mg/m2/dose (max 2.8 mg):  2.2 mg IVPB, day 1  Etoposide 125 mg/m2/dose:  194 mg IV over 60-90 min, days 1, 2 and 3.  Prednisone 40 mg/m2/dose/day divided BID:  30 mg po BID, days 1-7  Cyclophosphamide 800 mg/m2/dose:  1240 mg IVPB, day 1  Neulasta 6 mg SC x 1 dose, 24 hours after the last dose of chemotherapy. Patient has med at home and will bring to clinic to administer.   Aloxi 0.25 mg IV daily x 3 doses. Atarax and ativan for breakthrough nausea/emesis.  Pepcid po BID with prednisone for GI ppx.  Imaging to assess for response following this cycle is scheduled for 9/27.  It is ordered for CT neck and chest with contrast and PET.    Return for the next 4 days for chemotherapy, then in one week for day 8 cycle 2 chemotherapy to consist of blood work, Bleomycin, VCR and MD visit.    My thoughts and recommendations were discussed with the patient's grandparents.  They acknowledged understanding and all questions were addressed.      Denzel Vicente MD  Pediatric Hematology Oncology  [facility]

## 2023-11-13 ENCOUNTER — PRE VISIT (OUTPATIENT)
Dept: OTOLARYNGOLOGY | Facility: CLINIC | Age: 73
End: 2023-11-13

## 2023-11-13 ENCOUNTER — OFFICE VISIT (OUTPATIENT)
Dept: OTOLARYNGOLOGY | Facility: CLINIC | Age: 73
End: 2023-11-13
Payer: COMMERCIAL

## 2023-11-13 ENCOUNTER — VIRTUAL VISIT (OUTPATIENT)
Dept: OTOLARYNGOLOGY | Facility: CLINIC | Age: 73
End: 2023-11-13
Payer: COMMERCIAL

## 2023-11-13 ENCOUNTER — PATIENT OUTREACH (OUTPATIENT)
Dept: OTOLARYNGOLOGY | Facility: CLINIC | Age: 73
End: 2023-11-13

## 2023-11-13 VITALS
BODY MASS INDEX: 28.17 KG/M2 | DIASTOLIC BLOOD PRESSURE: 76 MMHG | HEIGHT: 63 IN | WEIGHT: 159 LBS | HEART RATE: 80 BPM | OXYGEN SATURATION: 97 % | SYSTOLIC BLOOD PRESSURE: 143 MMHG

## 2023-11-13 DIAGNOSIS — J04.10 ACUTE TRACHEITIS WITHOUT AIRWAY OBSTRUCTION: ICD-10-CM

## 2023-11-13 DIAGNOSIS — J38.6 SUBGLOTTIC STENOSIS: ICD-10-CM

## 2023-11-13 DIAGNOSIS — R49.0 DYSPHONIA: ICD-10-CM

## 2023-11-13 DIAGNOSIS — M31.30 GRANULOMATOSIS WITH POLYANGIITIS, UNSPECIFIED WHETHER RENAL INVOLVEMENT (H): Primary | ICD-10-CM

## 2023-11-13 DIAGNOSIS — J38.6 SUBGLOTTIC STENOSIS: Primary | ICD-10-CM

## 2023-11-13 DIAGNOSIS — M31.30 GRANULOMATOSIS WITH POLYANGIITIS WITHOUT RENAL INVOLVEMENT (H): ICD-10-CM

## 2023-11-13 PROCEDURE — 99205 OFFICE O/P NEW HI 60 MIN: CPT | Mod: 25 | Performed by: OTOLARYNGOLOGY

## 2023-11-13 PROCEDURE — 99207 PR NO BILLABLE SERVICE THIS VISIT: CPT | Performed by: SPEECH-LANGUAGE PATHOLOGIST

## 2023-11-13 PROCEDURE — 31625 BRONCHOSCOPY W/BIOPSY(S): CPT | Performed by: OTOLARYNGOLOGY

## 2023-11-13 PROCEDURE — 31579 LARYNGOSCOPY TELESCOPIC: CPT | Performed by: OTOLARYNGOLOGY

## 2023-11-13 PROCEDURE — 99000 SPECIMEN HANDLING OFFICE-LAB: CPT | Performed by: PATHOLOGY

## 2023-11-13 PROCEDURE — 87205 SMEAR GRAM STAIN: CPT | Performed by: OTOLARYNGOLOGY

## 2023-11-13 PROCEDURE — 87077 CULTURE AEROBIC IDENTIFY: CPT | Mod: 59 | Performed by: OTOLARYNGOLOGY

## 2023-11-13 RX ORDER — SULFAMETHOXAZOLE/TRIMETHOPRIM 800-160 MG
1 TABLET ORAL 2 TIMES DAILY
Qty: 20 TABLET | Refills: 0 | Status: CANCELLED | OUTPATIENT
Start: 2023-11-13 | End: 2023-11-23

## 2023-11-13 ASSESSMENT — PAIN SCALES - GENERAL: PAINLEVEL: NO PAIN (0)

## 2023-11-13 NOTE — PROGRESS NOTES
Dear Colleague:    I had the pleasure of meeting Susan Liu in consultation at the [facility] of the [facility] on a self-referred basis, for evaluation of breathing problems. The note from our visit follows. Speech recognition software may have been used in the documentation below; input is reviewed before signature to the best of my ability. I appreciate the opportunity to participate in the care of this pleasant patient.    Please feel free to contact me with any questions.    Sincerely yours,    Marcelina Jordan M.D., M.P.H.  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 She is s/p Microdirect laryngoscopy with incision, dilation, steroid injection of subglottic stenosis on 2/1/18, and previously in 2013. She was last seen in April 2018. She had made today's appointment just to check in because it had been a while since she was seen, but then her symptoms abruptly worsened in the past month.    Voice  -no major changes  -voice gets crackly when the weather gets cold    Swallowing  -having to chew more lately, over the past month or so  -twice a month or so, things try to get down the wrong tube, worse if she is rushing    Cough/Throat-clearing  -increasing cough/throat-clearing    Breathing  -when the weather gets cold her throat sounds crackly  -able to walk at least a block before she gets short of breath  -not having trouble breathing at rest  -breathing had been stable and started to get worse over just the past month or so  -breathing currently is not as bad as the first time she had airway surgery  -no major new PMH/PSH  -follows with Dr Greene for GPA  -taking low dose Bactrim/prednisone  for SGS    Throat discomfort  -No concerns.     Reflux-type symptoms  She experiences heartburn/indigestion occasionally. She is taking reflux medications sometimes. Can't eat tacos anymore. Tries not to eat late.    Prior EPIC/ outside records were reviewed for this visit, including:  Dea Tolbert PA-C 4/26/2023     MEDICATIONS:     Current Outpatient Medications   Medication Sig Dispense Refill    calcium carbonate-vitamin D 600-200 MG-UNIT TABS Take by mouth every morning       cetirizine (ZYRTEC) 10 MG tablet Take 10 mg by mouth At Bedtime       dextromethorphan-guaiFENesin (MUCINEX DM)  MG per 12 hr tablet Take 1 tablet by mouth as needed       EVENING PRIMROSE OIL PO Take by mouth daily (with lunch)      folic acid (FOLVITE) 1 MG tablet Take 1 mg by mouth every morning       ipratropium - albuterol 0.5 mg/2.5 mg/3 mL (DUONEB) 0.5-2.5 (3) MG/3ML nebulization Take 1 ampule by nebulization every 6 hours as needed      levothyroxine (SYNTHROID, LEVOTHROID) 50 MCG tablet Take 50 mcg by mouth every morning       lidocaine, PF, (XYLOCAINE) 2 % SOLN injection The following medication was given:     MEDICATION:  Lidocaine 2% Soln  ROUTE: Laryngeal gargle  SITE: Larynx  DOSE: 20 mL  LOT #: [id number]  : Hospira    EXPIRATION DATE: 5/1/2018  NDC#: 2302-7581-36   Was there drug waste? No    Suzy Sandoval RN   4/23/2018 1:42 PM      lidocaine, PF, (XYLOCAINE) 2 % SOLN injection The following medication was given:     MEDICATION:  Lidocaine 2% Soln  ROUTE: Laryngeal gargle  SITE: Larynx  DOSE: 18 mL  LOT #: [id number]  : [employer]   EXPIRATION DATE: 08/21  NDC#: 47651-280-99   Was there drug waste? Yes  Amount of drug waste (mL): 2.  Reason for waste:  Single use vial    Suzy Sandoval RN   3/9/2018 3:04 PM      Magnesium 400 MG TABS Take 400 mg by mouth At Bedtime      Melatonin 10 MG TABS tablet Take 10 mg by mouth At Bedtime      methotrexate 2.5 MG tablet Take 15 mg by mouth once a  week ON FRIDAY      Multiple Vitamin (DAILY MULTIVITAMIN PO) Take by mouth daily (with lunch)       omeprazole 20 MG tablet Take 40 mg by mouth daily (with lunch)       predniSONE (DELTASONE) 20 MG tablet Take 2.5 mg by mouth every morning       sulfamethoxazole-trimethoprim (BACTRIM/SEPTRA) suspension Take 10 mg/kg/day by mouth 2 times daily Dose based on TMP component.      TRAZODONE HCL PO Take 50 mg by mouth At Bedtime       triamcinolone acetonide (KENALOG) 40 MG/ML injection The following medication was given:     MEDICATION:  Kenalog 40 mg/1mL  ROUTE: Injection   SITE: Subglottis  DOSE: 1.4 mL  LOT #: [id number] (4 vials)  : SeptRx  EXPIRATION DATE: 12/2019  NDC#: [address]-8   Was there drug waste? Yes  Amount of drug waste (mL): 2.6  Reason for waste:  Single use vial      Suzy Sandoval RN   4/23/2018 1:40 PM 4 mL 0    triamcinolone acetonide (KENALOG) 40 MG/ML injection The following medication was given:     MEDICATION:  Kenalog 40 mg/1mL - 4 vials   ROUTE: Injection   SITE: Subglottis  DOSE: 2.6  LOT #: [id number]  : SeptRx  EXPIRATION DATE: June 2019, August 2019   NDC#: 1822-9721-26   Was there drug waste? Yes  Amount of drug waste (mL): 1.4  Reason for waste:  Single use vial      Suzy Sandoval RN   3/9/2018 3:05 PM 4 mL 0       ALLERGIES:    Allergies   Allergen Reactions    Penicillins Anaphylaxis    Codeine Nausea and Vomiting    Codeine Camsylate Nausea and Vomiting    Tramadol GI Disturbance    Tramadol Hcl        PAST MEDICAL HISTORY:   Past Medical History:   Diagnosis Date    Allergic rhinitis     Allergic rhinitis, cause unspecified     Anemia     Anxiety     Balance problem     Bleeding disorder (H)     Chronic sinusitis     Depression     Family history of thyroid problem     Hoarseness     Joint disease     Osteoarthritis     Osteoporosis     Reflux     Sinus problem     Skin disease     Thyroid disease     Tinnitus      Wesley syndrome         PAST SURGICAL HISTORY:   Past Surgical History:   Procedure Laterality Date    BUNIONECTOMY      both feet     SECTION      three    dilation for subglotic stenosis[      INJECT STEROID (LOCATION)  2013    Procedure: INJECT STEROID (LOCATION);;  Surgeon: Marcelina Jordan MD;  Location: [facility] OR    INJECT STEROID (LOCATION) N/A 2018    Procedure: INJECT STEROID (LOCATION);;  Surgeon: Marcelina Jordan MD;  Location: [facility] OR    LASER CO2 LARYNGOSCOPY, COMPLEX  2013    Procedure: LASER CO2 LARYNGOSCOPY, COMPLEX;  Co2 Lumenis Laser Micro Direct Laryngoscopy, Incision and Dilation Subglottic stenosis, Steroid Injection, Mitomycin application. ;  Surgeon: Marcelina Jordan MD;  Location: [facility] OR    LASER CO2 LARYNGOSCOPY, COMPLEX N/A 2018    Procedure: LASER CO2 LARYNGOSCOPY, COMPLEX;  Microdirect Laryngoscopy With Incision/Dilation Of Subglottic Stenosis, Steroid Injection,  Mitomycin Application With CO2 Laser ;  Surgeon: Marcelina Jordan MD;  Location: [facility] OR       HABITS/SOCIAL HISTORY:    Social History     Tobacco Use    Smoking status: Never    Smokeless tobacco: Never   Substance Use Topics    Alcohol use: Yes     Comment: 2 glasses wine a day       FAMILY HISTORY:    Family History   Problem Relation Age of Onset    Hypertension Mother     Dementia Mother     Depression Mother     Thyroid Disease Mother     Migraines Mother     Hypertension Father     Alcohol/Drug Father     Depression Father     Heart Disease Brother     Cancer Brother     Thyroid Disease Daughter     Migraines Daughter         2 daughters       REVIEW OF SYSTEMS:  Comprehensive 11 point review of systems was reviewed. Positives are as noted below; pertinent findings are as noted in the HPI.     Patient Supplied Answers to Review of Systems      2018     9:42 AM   [facility] ROS   Constitutional Appetite change    Unexplained fever or night sweats   Neurology Dizzy  spells    Numbness    Headache   Psychology Frequently feeling anxious   Eyes Visual loss   Ears, Nose, Throat Hearing loss    Ringing/noise in ears    Nasal congestion or drainage    Trouble swallowing    Hoarseness   Cardiopulmonary Wheezing   Gastrointestinal/Genitourinary Heartburn/indigestion   Musculoskeletal Sore or stiff joints   Allergy/Immunology Allergies or hay fever   Hematologic Easy bruising   Endocrine Thirst    Heat or cold intolerance   Other Rash            PHYSICAL EXAMINATION:  General: The patient was alert and conversant, and in no acute distress. Patient accompanied by her daughter.  Eyes: PERRL, conjunctiva and lids normal, sclera nonicteric.  Nose: Anterior rhinoscopy: no gross abnormalities. no  bleeding; no  mucopurulence; septum grossly normal, moderate  mucoid drainage and/or crusting.  Oral cavity/oropharynx: No masses or lesions. Edentulous. Tongue mobility and palate elevation intact and symmetric.  Ears: Normal auricles, external auditory canals bilaterally. Visualized portions of tympanic membranes normal bilaterally.   Neck: No palpable cervical lymphadenopathy. There was no significant tenderness to palpation of the thyrohyoid space, which was narrow. No obvious thyroid abnormality. Landmarks palpable.  Resp: Breathing comfortably, no stridor or stertor at rest, but with obvious noisy breathing upon rapid inhalation.  Neuro: Symmetric facial function. Other cranial nerves as documented above.  Psych: Normal affect, pleasant and cooperative.  Voice/speech: Moderate dysphonia characterized by breathiness, roughness, and strain.  Extremities: No cyanosis, clubbing, or edema of the upper extremities.       PROCEDURE:   Flexible fiberoptic laryngoscopy and laryngovideostroboscopy  Indications: This procedure was warranted to evaluate the patient's laryngeal anatomy and function. Risks, benefits, and alternatives were discussed.  Description: After written informed consent was  obtained, a time-out was performed to confirm patient identity, procedure, and procedure site. Topical 2% lidocaine and oxymetazoline were applied to the nasal cavities. I performed the endoscopy and no complications were apparent. Continuous and stroboscopic light were utilized to assess routine phonation and variable frequency phonation.  Performed by: Marcelina Jordan MD MPH  Findings: Normal nasopharynx. Normal base of tongue, valleculae, and epiglottis. Vocal fold mobility: right: normal; left: normal. Medial edges of the true vocal folds: mildly inflamed with adherent sticky secretions. No focal mucosal lesions were observed on the true vocal folds. Glissade produced appropriate elongation. Mucosa of false vocal folds, aryepiglottic folds, piriform sinuses, and posterior glottis unremarkable. Airway was patent but with moderate subglottic stenosis and heavy sticky secretions.   The addition of stroboscopy allowed evaluation of the mucosal wave.   Amplitude: right: mildly decreased; left: mildly decreased. Symmetry: intermittent symmetry. Closure pattern: complete. Closure plane: at glottic level. Phase distribution: normal.                          Flexible Bronchoscopy with biopsy for culture  Indications: This procedure was warranted to evaluate the patient's airway anatomy. Risks, benefits, and alternatives were discussed.  Description: After informed consent was obtained, a time-out was performed to confirm patient identity, procedure, and procedure site. Topical 2% lidocaine and oxymetazoline were applied to the nasal cavities and oropharynx. Secretions were grasped with endoscopic biopsy forceps passed through the channel of the endoscope. I performed the endoscopy and no complications were apparent.  Performed by: Marcelina Jordan MD MPH  Findings: Glottis patent with good bilateral vocal fold mobility. Subglottis with moderate stenosis, heavy burden of sticky secretions. Trachea patent below. Carol  merle. Unremarkable takeoffs of mainstem bronchi.                        IMPRESSION AND PLAN:   Susan Liu presents with recent onset worsening of her airway symptoms in the context of known prior SGS and GPA; exam shows significant sticky mucopurulent secretions and erythema of the airway. She states she had been doing fine with her breathing and voice until a month ago.    Plan:  1) medical management to start  -raise Bactrim dosing to treatment dose - 1 tab DS bid x 10 days  -will follow cultures and adjust antibiotics as appropriate  -nebulized steroids to reduce airway inflammation  -we will also plan a PO steroid burst if this is acceptable to her rheumatologist  2) if medical mgmt is insufficient, may need to consider surgical treatment  3) plan RTC ~3 weeks or sooner as needed.     I appreciate the opportunity to participate in the care of this patient.     I spent a total of 65 minutes on 11/13/2023 in chart review, review of tests, patient visit, documentation, care coordination, and/or discussion with other providers about the issues documented above, separate from any documented procedure(s).

## 2023-11-13 NOTE — PROGRESS NOTES
"[facility]  Clinical Voice and Upper Airway Evaluation Report    Patient's Name: Susan Liu  Date of Evaluation: 11/13/2023  Providing SLP: Caty Juares MS CCC-SLP  Seen in Conjunction With: Marcelina Jordan MD MPH  Insurance Coverage: [employer] Medicare Advantage  Chief Complaint: Dyspnea  Evaluation Location: [facility]      Patient History:     Susan is a 73-year-old female who presents today for evaluation of dyspnea. She has a history of granulomatosis with polyangiitis and subglottic stenosis and has been under the care of Dr. Jordan, most recent in 2018. No skilled SLP services are warranted today, resulting in a no charge visit.       Quality of Life Questionnaires:    Patient Supplied Answers To Last 2 VHI Questionnaires      3/9/2018    10:58 AM 4/23/2018    12:50 PM   Voice Handicap Index (VHI-10)   My voice makes it difficult for people to hear me 2 2   People have difficulty understanding me in a noisy room 2 2   My voice difficulties restrict my personal and social life.  2 2   I feel left out of conversations because of my voice 2 2   My voice problem causes me to lose income 2 2   I feel as though I have to strain to produce voice 2 2   The clarity of my voice is unpredictable 2 2   My voice problem upsets me 2 2   My voice makes me feel handicapped 2 2   People ask, \"What's wrong with your voice?\" 2 2   VHI-10 20 20     Patient Supplied Answers To Last 2 CSI Questionnaires      3/9/2018    10:59 AM 4/23/2018    12:51 PM   Cough Severity Index (CSI)   My cough is worse when I lie down 2 2   My coughing problem causes me to restrict my personal and social life 2 2   I tend to avoid places because of my cough problem 2 2   I feel embarrassed because of my coughing problem 2 2   People ask, ''What's wrong?'' because I cough a lot 2 2   I run out of air when I cough 2 2   My coughing problem affects my voice 2 2   My coughing problem limits " my physical activity 2 2   My coughing problem upsets me 2 2   People ask me if I am sick because I cough a lot 2 2   CSI Score 20 20     Patient Supplied Answers To Last 2 EAT Questionnaires      3/9/2018    11:00 AM 2018    12:51 PM   Eating Assessment Tool (EAT-10)   My swallowing problem has caused me to lose weight 0 1   My swallowing problem interferes with my ability to go out for meals 1 0   Swallowing liquids takes extra effort 1 1   Swallowing solids takes extra effort 2 1   Swallowing pills takes extra effort 0 0   Swallowing is painful 0 1   The pleasure of eating is affected by my swallowing 1 0   When I swallow food sticks in my throat 1 1   I cough when I eat 1 1   Swallowing is stressful 0 1   EAT-10 7 7       Thank you for allowing me to participate in this patient's care,    Caty Juares MS CCC-SLP  Speech-Language Pathologist  [facility]  Department of Otolaryngology - Head and Neck Surgery  [facility]  [email or web address]  Direct: [phone]  [phone]    *This note may have been completed using [id number] dictation software, so errors may exist. Please contact me for clarification if needed*

## 2023-11-13 NOTE — NURSING NOTE
"Chief Complaint   Patient presents with    Consult     Wegners syndrome       Blood pressure (!) 143/76, pulse 80, height 1.6 m (5' 3\"), weight 72.1 kg (159 lb), SpO2 97%.    Trinidad Bautista, EMT    "

## 2023-11-13 NOTE — PROGRESS NOTES
Spoke with rhuemoatology nurse at Dr. Greene's office. Provider would like to increase steroid and wants to clear it with the provider in [facility] first. Nurse will speak with provider and call back on provided direct number. Virgie Ayala RN on 11/13/2023 at 1:40 PM

## 2023-11-13 NOTE — LETTER
11/13/2023      RE: Susan Liu  [address] [place] [address]       Dear Colleague:    I had the pleasure of meeting Susan Liu in consultation at the [facility] of the [facility] on a self-referred basis, for evaluation of breathing problems. The note from our visit follows. Speech recognition software may have been used in the documentation below; input is reviewed before signature to the best of my ability. I appreciate the opportunity to participate in the care of this pleasant patient.    Please feel free to contact me with any questions.    Sincerely yours,    Marcelina Jordan M.D., M.P.H.  , Laryngology  Director, 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 She is s/p Microdirect laryngoscopy with incision, dilation, steroid injection of subglottic stenosis on 2/1/18, and previously in 2013. She was last seen in April 2018. She had made today's appointment just to check in because it had been a while since she was seen, but then her symptoms abruptly worsened in the past month.    Voice  -no major changes  -voice gets crackly when the weather gets cold    Swallowing  -having to chew more lately, over the past month or so  -twice a month or so, things try to get down the wrong tube, worse if she is rushing    Cough/Throat-clearing  -increasing cough/throat-clearing    Breathing  -when the weather gets cold her throat sounds crackly  -able to walk at least a block before she gets short of breath  -not having trouble breathing at rest  -breathing had been stable and started to get worse over just the past month or so  -breathing currently is not as bad as the first time she had airway surgery  -no major new  PMH/PSH  -follows with Dr Greene for GPA  -taking low dose Bactrim/prednisone for SGS    Throat discomfort  -No concerns.     Reflux-type symptoms  She experiences heartburn/indigestion occasionally. She is taking reflux medications sometimes. Can't eat tacos anymore. Tries not to eat late.    Prior EPIC/ outside records were reviewed for this visit, including:  Dea Tolbert PA-C 4/26/2023     MEDICATIONS:     Current Outpatient Medications   Medication Sig Dispense Refill    calcium carbonate-vitamin D 600-200 MG-UNIT TABS Take by mouth every morning       cetirizine (ZYRTEC) 10 MG tablet Take 10 mg by mouth At Bedtime       dextromethorphan-guaiFENesin (MUCINEX DM)  MG per 12 hr tablet Take 1 tablet by mouth as needed       EVENING PRIMROSE OIL PO Take by mouth daily (with lunch)      folic acid (FOLVITE) 1 MG tablet Take 1 mg by mouth every morning       ipratropium - albuterol 0.5 mg/2.5 mg/3 mL (DUONEB) 0.5-2.5 (3) MG/3ML nebulization Take 1 ampule by nebulization every 6 hours as needed      levothyroxine (SYNTHROID, LEVOTHROID) 50 MCG tablet Take 50 mcg by mouth every morning       lidocaine, PF, (XYLOCAINE) 2 % SOLN injection The following medication was given:     MEDICATION:  Lidocaine 2% Soln  ROUTE: Laryngeal gargle  SITE: Larynx  DOSE: 20 mL  LOT #: [id number]  : Hospira    EXPIRATION DATE: 5/1/2018  NDC#: 9087-9540-06   Was there drug waste? No    Suzy Sandoval RN   4/23/2018 1:42 PM      lidocaine, PF, (XYLOCAINE) 2 % SOLN injection The following medication was given:     MEDICATION:  Lidocaine 2% Soln  ROUTE: Laryngeal gargle  SITE: Larynx  DOSE: 18 mL  LOT #: [id number]  : HeartThis   EXPIRATION DATE: 08/21  NDC#: 09091-069-36   Was there drug waste? Yes  Amount of drug waste (mL): 2.  Reason for waste:  Single use vial    Suzy Sandoval RN   3/9/2018 3:04 PM      Magnesium 400 MG TABS Take 400 mg by mouth At Bedtime      Melatonin 10 MG TABS tablet Take 10 mg by  mouth At Bedtime      methotrexate 2.5 MG tablet Take 15 mg by mouth once a week ON FRIDAY      Multiple Vitamin (DAILY MULTIVITAMIN PO) Take by mouth daily (with lunch)       omeprazole 20 MG tablet Take 40 mg by mouth daily (with lunch)       predniSONE (DELTASONE) 20 MG tablet Take 2.5 mg by mouth every morning       sulfamethoxazole-trimethoprim (BACTRIM/SEPTRA) suspension Take 10 mg/kg/day by mouth 2 times daily Dose based on TMP component.      TRAZODONE HCL PO Take 50 mg by mouth At Bedtime       triamcinolone acetonide (KENALOG) 40 MG/ML injection The following medication was given:     MEDICATION:  Kenalog 40 mg/1mL  ROUTE: Injection   SITE: Subglottis  DOSE: 1.4 mL  LOT #: [id number] (4 vials)  : Vivartes  EXPIRATION DATE: 12/2019  NDC#: [address]-7   Was there drug waste? Yes  Amount of drug waste (mL): 2.6  Reason for waste:  Single use vial      Suzy Sandoval RN   4/23/2018 1:40 PM 4 mL 0    triamcinolone acetonide (KENALOG) 40 MG/ML injection The following medication was given:     MEDICATION:  Kenalog 40 mg/1mL - 4 vials   ROUTE: Injection   SITE: Subglottis  DOSE: 2.6  LOT #: [id number]  : Vivartes  EXPIRATION DATE: June 2019, August 2019   NDC#: 7892-9532-48   Was there drug waste? Yes  Amount of drug waste (mL): 1.4  Reason for waste:  Single use vial      Suzy Sandoval RN   3/9/2018 3:05 PM 4 mL 0       ALLERGIES:    Allergies   Allergen Reactions    Penicillins Anaphylaxis    Codeine Nausea and Vomiting    Codeine Camsylate Nausea and Vomiting    Tramadol GI Disturbance    Tramadol Hcl        PAST MEDICAL HISTORY:   Past Medical History:   Diagnosis Date    Allergic rhinitis     Allergic rhinitis, cause unspecified     Anemia     Anxiety     Balance problem     Bleeding disorder (H)     Chronic sinusitis     Depression     Family history of thyroid problem     Hoarseness     Joint disease     Osteoarthritis     Osteoporosis      Reflux     Sinus problem     Skin disease     Thyroid disease     Tinnitus     Wesley syndrome         PAST SURGICAL HISTORY:   Past Surgical History:   Procedure Laterality Date    BUNIONECTOMY      both feet     SECTION      three    dilation for subglotic stenosis[      INJECT STEROID (LOCATION)  2013    Procedure: INJECT STEROID (LOCATION);;  Surgeon: Marcelina Jordan MD;  Location: [facility] OR    INJECT STEROID (LOCATION) N/A 2018    Procedure: INJECT STEROID (LOCATION);;  Surgeon: Marcelina Jordan MD;  Location: [facility] OR    LASER CO2 LARYNGOSCOPY, COMPLEX  2013    Procedure: LASER CO2 LARYNGOSCOPY, COMPLEX;  Co2 Lumenis Laser Micro Direct Laryngoscopy, Incision and Dilation Subglottic stenosis, Steroid Injection, Mitomycin application. ;  Surgeon: Marcelina Jordan MD;  Location: [facility] OR    LASER CO2 LARYNGOSCOPY, COMPLEX N/A 2018    Procedure: LASER CO2 LARYNGOSCOPY, COMPLEX;  Microdirect Laryngoscopy With Incision/Dilation Of Subglottic Stenosis, Steroid Injection,  Mitomycin Application With CO2 Laser ;  Surgeon: Marcelina Jordan MD;  Location: [facility] OR       HABITS/SOCIAL HISTORY:    Social History     Tobacco Use    Smoking status: Never    Smokeless tobacco: Never   Substance Use Topics    Alcohol use: Yes     Comment: 2 glasses wine a day       FAMILY HISTORY:    Family History   Problem Relation Age of Onset    Hypertension Mother     Dementia Mother     Depression Mother     Thyroid Disease Mother     Migraines Mother     Hypertension Father     Alcohol/Drug Father     Depression Father     Heart Disease Brother     Cancer Brother     Thyroid Disease Daughter     Migraines Daughter         2 daughters       REVIEW OF SYSTEMS:  Comprehensive 11 point review of systems was reviewed. Positives are as noted below; pertinent findings are as noted in the HPI.     Patient Supplied Answers to Review of Systems      2018     9:42 AM    ENT EARNEST    Constitutional Appetite change    Unexplained fever or night sweats   Neurology Dizzy spells    Numbness    Headache   Psychology Frequently feeling anxious   Eyes Visual loss   Ears, Nose, Throat Hearing loss    Ringing/noise in ears    Nasal congestion or drainage    Trouble swallowing    Hoarseness   Cardiopulmonary Wheezing   Gastrointestinal/Genitourinary Heartburn/indigestion   Musculoskeletal Sore or stiff joints   Allergy/Immunology Allergies or hay fever   Hematologic Easy bruising   Endocrine Thirst    Heat or cold intolerance   Other Rash            PHYSICAL EXAMINATION:  General: The patient was alert and conversant, and in no acute distress. Patient accompanied by her daughter.  Eyes: PERRL, conjunctiva and lids normal, sclera nonicteric.  Nose: Anterior rhinoscopy: no gross abnormalities. no  bleeding; no  mucopurulence; septum grossly normal, moderate  mucoid drainage and/or crusting.  Oral cavity/oropharynx: No masses or lesions. Edentulous. Tongue mobility and palate elevation intact and symmetric.  Ears: Normal auricles, external auditory canals bilaterally. Visualized portions of tympanic membranes normal bilaterally.   Neck: No palpable cervical lymphadenopathy. There was no significant tenderness to palpation of the thyrohyoid space, which was narrow. No obvious thyroid abnormality. Landmarks palpable.  Resp: Breathing comfortably, no stridor or stertor at rest, but with obvious noisy breathing upon rapid inhalation.  Neuro: Symmetric facial function. Other cranial nerves as documented above.  Psych: Normal affect, pleasant and cooperative.  Voice/speech: Moderate dysphonia characterized by breathiness, roughness, and strain.  Extremities: No cyanosis, clubbing, or edema of the upper extremities.       PROCEDURE:   Flexible fiberoptic laryngoscopy and laryngovideostroboscopy  Indications: This procedure was warranted to evaluate the patient's laryngeal anatomy and function. Risks, benefits,  and alternatives were discussed.  Description: After written informed consent was obtained, a time-out was performed to confirm patient identity, procedure, and procedure site. Topical 2% lidocaine and oxymetazoline were applied to the nasal cavities. I performed the endoscopy and no complications were apparent. Continuous and stroboscopic light were utilized to assess routine phonation and variable frequency phonation.  Performed by: Marcelina Jordan MD MPH  Findings: Normal nasopharynx. Normal base of tongue, valleculae, and epiglottis. Vocal fold mobility: right: normal; left: normal. Medial edges of the true vocal folds: mildly inflamed with adherent sticky secretions. No focal mucosal lesions were observed on the true vocal folds. Glissade produced appropriate elongation. Mucosa of false vocal folds, aryepiglottic folds, piriform sinuses, and posterior glottis unremarkable. Airway was patent but with moderate subglottic stenosis and heavy sticky secretions.   The addition of stroboscopy allowed evaluation of the mucosal wave.   Amplitude: right: mildly decreased; left: mildly decreased. Symmetry: intermittent symmetry. Closure pattern: complete. Closure plane: at glottic level. Phase distribution: normal.                          Flexible Bronchoscopy with biopsy for culture  Indications: This procedure was warranted to evaluate the patient's airway anatomy. Risks, benefits, and alternatives were discussed.  Description: After informed consent was obtained, a time-out was performed to confirm patient identity, procedure, and procedure site. Topical 2% lidocaine and oxymetazoline were applied to the nasal cavities and oropharynx. Secretions were grasped with endoscopic biopsy forceps passed through the channel of the endoscope. I performed the endoscopy and no complications were apparent.  Performed by: Marcelina Jordan MD MPH  Findings: Glottis patent with good bilateral vocal fold mobility. Subglottis with  moderate stenosis, heavy burden of sticky secretions. Trachea patent below. Carol sharp. Unremarkable takeoffs of mainstem bronchi.                        IMPRESSION AND PLAN:   Susan Liu presents with recent onset worsening of her airway symptoms in the context of known prior SGS and GPA; exam shows significant sticky mucopurulent secretions and erythema of the airway. She states she had been doing fine with her breathing and voice until a month ago.    Plan:  1) medical management to start  -raise Bactrim dosing to treatment dose - 1 tab DS bid x 10 days  -will follow cultures and adjust antibiotics as appropriate  -nebulized steroids to reduce airway inflammation  -we will also plan a PO steroid burst if this is acceptable to her rheumatologist  2) if medical mgmt is insufficient, may need to consider surgical treatment  3) plan RTC ~3 weeks or sooner as needed.     I appreciate the opportunity to participate in the care of this patient.     I spent a total of 65 minutes on 11/13/2023 in chart review, review of tests, patient visit, documentation, care coordination, and/or discussion with other providers about the issues documented above, separate from any documented procedure(s).    Marcelina Jordan MD

## 2023-11-13 NOTE — PATIENT INSTRUCTIONS
1.  You were seen in the ENT Clinic today by Dr. Jordan. If you have any questions or concerns after your appointment, please call [phone]. Press option #1 for scheduling related needs. Press option #3 for Nurse advice.    2.  Dr. Jordan has recommended the following:   - nebulizer steroid   - prednisone taper    - antibiotic cleared by Dr. Greene      3.  Plan is to return to clinic 12/4 at 11:30       Vrigie Ayala RN  [phone]  [facility] - Otolaryngology

## 2023-11-14 ENCOUNTER — PATIENT OUTREACH (OUTPATIENT)
Dept: OTOLARYNGOLOGY | Facility: CLINIC | Age: 73
End: 2023-11-14
Payer: COMMERCIAL

## 2023-11-14 RX ORDER — PREDNISONE 5 MG/1
TABLET ORAL
Qty: 67 TABLET | Refills: 1 | Status: SHIPPED | OUTPATIENT
Start: 2023-11-14 | End: 2024-04-22

## 2023-11-14 RX ORDER — BUDESONIDE 0.25 MG/2ML
INHALANT ORAL
Qty: 2 ML | Refills: 0 | Status: SHIPPED | OUTPATIENT
Start: 2023-11-14 | End: 2023-11-15

## 2023-11-14 NOTE — PROGRESS NOTES
Talked to patient to let her know that provider would like her to start taking a few medications. Per patient she is no longer on the methotrexate for years now. Let provider know and awaiting response to prescribe best medication. Patient was agreeable and verbalized understanding of the situation. Virgie Ayala RN on 11/14/2023 at 2:26 PM

## 2023-11-14 NOTE — PROGRESS NOTES
Called patient to inquire if she has been on the bactrim and the methotrexate at the same time before. Was unable to reach patient or daughter and voicemail box is full. Virgie Ayala RN on 11/14/2023 at 12:47 PM

## 2023-11-14 NOTE — PROGRESS NOTES
Call [facility] office to ask about bactrim and the methotrexate contraindication. Nurse will contact Dr. Greene and let us know. Direct number provided. Virgie Ayala RN on 11/14/2023 at 12:59 PM

## 2023-11-15 ENCOUNTER — PATIENT OUTREACH (OUTPATIENT)
Dept: OTOLARYNGOLOGY | Facility: CLINIC | Age: 73
End: 2023-11-15
Payer: COMMERCIAL

## 2023-11-15 DIAGNOSIS — J38.6 SUBGLOTTIC STENOSIS: Primary | ICD-10-CM

## 2023-11-15 RX ORDER — CIPROFLOXACIN AND DEXAMETHASONE 3; 1 MG/ML; MG/ML
SUSPENSION/ DROPS AURICULAR (OTIC)
Qty: 7.5 ML | Refills: 0 | Status: SHIPPED | OUTPATIENT
Start: 2023-11-15 | End: 2023-11-15

## 2023-11-15 RX ORDER — CIPROFLOXACIN AND DEXAMETHASONE 3; 1 MG/ML; MG/ML
SUSPENSION/ DROPS AURICULAR (OTIC)
Qty: 7.5 ML | Refills: 0 | Status: SHIPPED | OUTPATIENT
Start: 2023-11-15

## 2023-11-15 RX ORDER — SULFAMETHOXAZOLE/TRIMETHOPRIM 800-160 MG
1 TABLET ORAL 2 TIMES DAILY
Qty: 20 TABLET | Refills: 0 | Status: SHIPPED | OUTPATIENT
Start: 2023-11-15 | End: 2023-11-25

## 2023-11-15 NOTE — PROGRESS NOTES
Called patient to let her know about medications changes, but was unable to reach patient or leave a message. Called patient's daughter to let her know that patient will want to discontinue the Pulmicort and replace it with the ciprodex. Also sent in the bactrim for patient to start taking at a higher dose. Provided direct number for call back with questions. Virgie Ayala RN on 11/15/2023 at 8:08 AM      perirectal

## 2023-11-16 ENCOUNTER — PATIENT OUTREACH (OUTPATIENT)
Dept: OTOLARYNGOLOGY | Facility: CLINIC | Age: 73
End: 2023-11-16
Payer: COMMERCIAL

## 2023-11-16 DIAGNOSIS — J38.6 SUBGLOTTIC STENOSIS: Primary | ICD-10-CM

## 2023-11-16 RX ORDER — CIPROFLOXACIN 500 MG/1
500 TABLET, FILM COATED ORAL 2 TIMES DAILY
Qty: 14 TABLET | Refills: 0 | Status: SHIPPED | OUTPATIENT
Start: 2023-11-16 | End: 2023-12-04

## 2023-11-16 NOTE — PROGRESS NOTES
Called patient to let her know provider would like her to stop taking the bactrim as the culture grew back resistant to bactrim. Ciprofloxacin will replace for 10 days. Went over side effects with patient told them to let us know if adverse effects were to occur to let us know. Patient was agreeable and verbalized understanding of the situation. Virgie Ayala RN on 11/16/2023 at 3:45 PM

## 2023-11-17 ENCOUNTER — DOCUMENTATION ONLY (OUTPATIENT)
Dept: OTOLARYNGOLOGY | Facility: CLINIC | Age: 73
End: 2023-11-17
Payer: COMMERCIAL

## 2023-11-17 NOTE — PROGRESS NOTES
Signed 90 day fill request was faxed to [facility]. Fax number [phone]. 1 pg faxed.

## 2023-11-18 LAB
BACTERIA SPT CULT: ABNORMAL
GRAM STAIN RESULT: ABNORMAL
GRAM STAIN RESULT: ABNORMAL

## 2023-12-04 ENCOUNTER — OFFICE VISIT (OUTPATIENT)
Dept: OTOLARYNGOLOGY | Facility: CLINIC | Age: 73
End: 2023-12-04
Payer: COMMERCIAL

## 2023-12-04 ENCOUNTER — PATIENT OUTREACH (OUTPATIENT)
Dept: OTOLARYNGOLOGY | Facility: CLINIC | Age: 73
End: 2023-12-04

## 2023-12-04 VITALS
HEIGHT: 62 IN | DIASTOLIC BLOOD PRESSURE: 79 MMHG | HEART RATE: 82 BPM | WEIGHT: 159 LBS | SYSTOLIC BLOOD PRESSURE: 148 MMHG | BODY MASS INDEX: 29.26 KG/M2

## 2023-12-04 DIAGNOSIS — J38.6 SUBGLOTTIC STENOSIS: Primary | ICD-10-CM

## 2023-12-04 DIAGNOSIS — M31.30 GRANULOMATOSIS WITH POLYANGIITIS, UNSPECIFIED WHETHER RENAL INVOLVEMENT (H): ICD-10-CM

## 2023-12-04 PROCEDURE — 31622 DX BRONCHOSCOPE/WASH: CPT | Performed by: OTOLARYNGOLOGY

## 2023-12-04 PROCEDURE — 99215 OFFICE O/P EST HI 40 MIN: CPT | Mod: 25 | Performed by: OTOLARYNGOLOGY

## 2023-12-04 RX ORDER — CIPROFLOXACIN 500 MG/1
500 TABLET, FILM COATED ORAL 2 TIMES DAILY
Qty: 42 TABLET | Refills: 0 | Status: SHIPPED | OUTPATIENT
Start: 2023-12-04 | End: 2023-12-25

## 2023-12-04 RX ORDER — SODIUM CHLORIDE FOR INHALATION 0.9 %
5 VIAL, NEBULIZER (ML) INHALATION PRN
Qty: 500 ML | Refills: 0 | Status: SHIPPED | OUTPATIENT
Start: 2023-12-04

## 2023-12-04 RX ORDER — PREDNISONE 5 MG/1
TABLET ORAL
Qty: 114 TABLET | Refills: 1 | Status: SHIPPED | OUTPATIENT
Start: 2023-12-04 | End: 2024-04-22

## 2023-12-04 ASSESSMENT — PAIN SCALES - GENERAL: PAINLEVEL: NO PAIN (0)

## 2023-12-04 NOTE — PROGRESS NOTES
Called patient's GPA provider as GPA level is high and provider is worried about a flare up which could inhibit patient's ability to brathe. Writer disclosed concerns of provider to GPA RN, and RN will reach out to patient to help schedule an appointment to help manage medications to help with high levels of GPA. RN was agreeable to this plan of care and will reach out with any questions or concerns in the meantime. Virgie Ayala RN on 12/4/2023 at 1:01 PM

## 2023-12-04 NOTE — PATIENT INSTRUCTIONS
1.  You were seen in the ENT Clinic today by Dr. Jordan. If you have any questions or concerns after your appointment, please call [phone]. Press option #1 for scheduling related needs. Press option #3 for Nurse advice.    2.  Dr. Jordan has recommended the following:   - Ciprofloxacin for 3 weeks    - Prednisone for 3 weeks, please read taper and go over taper with pharmacist    - If not scheduled with Dr. Greene soon reach out to office   - Nebulizer solution     3.  Plan is to return to clinic 12/15 at 7:30 AM       Virgie Ayala RN  [phone]  [facility] - Otolaryngology

## 2023-12-04 NOTE — LETTER
12/4/2023      RE: Susan Liu  [address] [place] [address]       Dear Colleague:  Susan Liu recently returned for follow-up at the [facility]. My clinic note from our visit is enclosed below.  Speech recognition software may have been used in the documentation below; input is reviewed before signature to the best of my ability.     I appreciate the ongoing opportunity to participate in this patient's care.    Please feel free to contact me with any questions.      Sincerely yours,  Marcelina Jordan M.D., M.P.H.  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 She is s/p Microdirect laryngoscopy with incision, dilation, steroid injection of subglottic stenosis on 2/1/18, and previously in 2013.    She had been lost to follow up and returned in Nov 2023 with sticky mucopurulent secretions. Based on culture data she was ultimately treated with ciprofloxacin PO and ciprodex nebs.      Today's updates:  - Antibiotics helped to mostly reduce the secretions, but they increased again after she completed the antibiotics; she is still doing the nebs.  - Steroids also seemed to be very helpful.  - Breathing is feeling better than it had been, but the secretions can be sticky and hard to cough out, and at those times breathing is harder. Nebs help a lot with the sticky secretions.  - There is a URI going around in her family. She is trying to avoid catching it.  - Voice is substantially better.  - Swallow is stable.      MEDICATIONS:     Current Outpatient Medications   Medication Sig Dispense Refill     calcium carbonate-vitamin D 600-200 MG-UNIT TABS Take by mouth every morning        cetirizine (ZYRTEC) 10  MG tablet Take 10 mg by mouth At Bedtime        ciprofloxacin-dexAMETHasone (CIPRODEX) 0.3-0.1 % otic suspension Add 4 drops  in nebulizer. 7.5 mL 0     dextromethorphan-guaiFENesin (MUCINEX DM)  MG per 12 hr tablet Take 1 tablet by mouth as needed        EVENING PRIMROSE OIL PO Take by mouth daily (with lunch)       folic acid (FOLVITE) 1 MG tablet Take 1 mg by mouth every morning        ipratropium - albuterol 0.5 mg/2.5 mg/3 mL (DUONEB) 0.5-2.5 (3) MG/3ML nebulization Take 1 ampule by nebulization every 6 hours as needed       levothyroxine (SYNTHROID, LEVOTHROID) 50 MCG tablet Take 50 mcg by mouth every morning        lidocaine, PF, (XYLOCAINE) 2 % SOLN injection The following medication was given:     MEDICATION:  Lidocaine 2% Soln  ROUTE: Laryngeal gargle  SITE: Larynx  DOSE: 20 mL  LOT #: [id number]  : [facility]    EXPIRATION DATE: 5/1/2018  NDC#: 7605-0395-08   Was there drug waste? No    Suzy Sandoval RN   4/23/2018 1:42 PM       lidocaine, PF, (XYLOCAINE) 2 % SOLN injection The following medication was given:     MEDICATION:  Lidocaine 2% Soln  ROUTE: Laryngeal gargle  SITE: Larynx  DOSE: 18 mL  LOT #: [id number]  : [employer]   EXPIRATION DATE: 08/21  NDC#: 84903-476-67   Was there drug waste? Yes  Amount of drug waste (mL): 2.  Reason for waste:  Single use vial    Suzy Sandoval RN   3/9/2018 3:04 PM       Magnesium 400 MG TABS Take 400 mg by mouth At Bedtime       Melatonin 10 MG TABS tablet Take 10 mg by mouth At Bedtime       methotrexate 2.5 MG tablet Take 15 mg by mouth once a week ON FRIDAY       Multiple Vitamin (DAILY MULTIVITAMIN PO) Take by mouth daily (with lunch)        omeprazole 20 MG tablet Take 40 mg by mouth daily (with lunch)        predniSONE (DELTASONE) 20 MG tablet Take 2.5 mg by mouth every morning        predniSONE (DELTASONE) 5 MG tablet Take (12) 5 mg daily tablets for two days, take (10) 5 mg daily for two days, take (9) 5 mg tablets for one day,  take (6) 5 mg tablets for one day, take (4) 5 mg tablets for one day, take (2) 5 mg tablets for one day, take (1) 5 mg tablet one day, and then continue taking normal 2.5 daily dose. 67 tablet 1     sulfamethoxazole-trimethoprim (BACTRIM/SEPTRA) suspension Take 10 mg/kg/day by mouth 2 times daily Dose based on TMP component.       TRAZODONE HCL PO Take 50 mg by mouth At Bedtime        triamcinolone acetonide (KENALOG) 40 MG/ML injection The following medication was given:     MEDICATION:  Kenalog 40 mg/1mL  ROUTE: Injection   SITE: Subglottis  DOSE: 1.4 mL  LOT #: [id number] (4 vials)  : [employer]  EXPIRATION DATE: 12/2019  NDC#: [address]-0   Was there drug waste? Yes  Amount of drug waste (mL): 2.6  Reason for waste:  Single use vial      Suzy Sandoval RN   4/23/2018 1:40 PM 4 mL 0     triamcinolone acetonide (KENALOG) 40 MG/ML injection The following medication was given:     MEDICATION:  Kenalog 40 mg/1mL - 4 vials   ROUTE: Injection   SITE: Subglottis  DOSE: 2.6  LOT #: [id number]  : [employer]  EXPIRATION DATE: June 2019, August 2019   NDC#: 5014-8400-40   Was there drug waste? Yes  Amount of drug waste (mL): 1.4  Reason for waste:  Single use vial      Suzy Sandoval RN   3/9/2018 3:05 PM 4 mL 0       ALLERGIES:    Allergies   Allergen Reactions     Penicillins Anaphylaxis     Codeine Nausea and Vomiting     Codeine Camsylate Nausea and Vomiting     Tramadol GI Disturbance     Tramadol Hcl        NEW PMH/PSH: None    REVIEW OF SYSTEMS:  The patient completed a comprehensive 11 point review of systems (below), which was reviewed. Positives are as noted below.  Patient Supplied Answers to Review of Systems  Noncontributory         PHYSICAL EXAM:  General: The patient was alert and conversant, and in no acute distress.    Neck: No palpable cervical lymphadenopathy, no significant tenderness to palpation of the thyrohyoid space, which was narrow. No  obvious thyroid abnormality.  Resp: Breathing comfortably, mild to moderately noisy breathing at rest initially, which improved after coughing out mucus. Obviously noisy breathing with rapid inhalation.  Neuro: Symmetric facial function. Other cranial nerve function as documented above.  Psych: Normal affect, pleasant and cooperative.  Voice/speech: No significant dysphonia.      Procedure:   Flexible Bronchoscopy (26367)  Indications: This procedure was warranted to evaluate the patient's airway anatomy. Risks, benefits, and alternatives were discussed.  Description: After informed consent was obtained, a time-out was performed to confirm patient identity, procedure, and procedure site. Topical 3% lidocaine with 0.25% phenylephrine was applied to the nasal cavities and oropharynx. I performed the endoscopy and no complications were apparent.  Performed by: Marcelina Jordan MD MPH  Findings: Glottis patent with good bilateral vocal fold mobility. Slightly improved airway patency at the glottis, with no crusting. Subglottis still with moderate stenosis, significant inflammation and secretions. Trachea clear distally. Carol sharp. Unremarkable takeoffs of mainstem bronchi.                                IMPRESSION AND PLAN:   Susan Liu returns with some improvement of the sticky glottic secretions and considerable vocal improvement, but she still has substantial subglottic inflammation along with purulence/ crusting and narrowing. She had a partial response to antibiotics and steroids, and than has had some interval worsening since completing them. She is still using Ciprodex nebs.    Plan:  1) We will resume antibiotics and PO steroids for a longer course (3 weeks) this time, to try to medically manage this flare.   2) I asked her to contact Dr. Greene's office to clarify her medication regimen. Her GPA does not appear well controlled at present so I am asking her to check in with them regarding whether she  should resume the methotrexate and/or if she should do something different for disease control. Our team also did put a call into their office to facilitate timely communication.  3) If medical management is insufficient, we will need to consider temporizing her airway in the OR, but she understands that the underlying issue of the GPA-related inflammation will still need to be addressed to prevent rapid recurrence and ongoing crusting/inflammation. She would prefer to avoid the OR if possible.  4) Needs saline for nebs; an rx will be provided for this as well. Also recommended humidifier, hydration.  5) RTC 3 weeks or sooner as needed; reviewed when to escalate for more urgent care/ER/911. Encouraged her to contact our office with any worsening/concerns.    I spent a total of 42 minutes on 12/4/2023 in chart review, review of tests, patient visit, documentation, care coordination, and/or discussion with other providers about the issues documented above, separate from any documented procedure(s).      Marcelina Jordan MD

## 2023-12-04 NOTE — LETTER
12/4/2023       RE: Susan Liu  [address] [place] [address]     Dear Colleague,    Thank you for referring your patient, Susan Liu, to the [facility] [place] at [facility]. Please see a copy of my visit note below.    Dear Colleague:  Susan Liu recently returned for follow-up at the [facility]. My clinic note from our visit is enclosed below.  Speech recognition software may have been used in the documentation below; input is reviewed before signature to the best of my ability.     I appreciate the ongoing opportunity to participate in this patient's care.    Please feel free to contact me with any questions.      Sincerely yours,  Marcelina Jordan M.D., M.P.H.  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 She is s/p Microdirect laryngoscopy with incision, dilation, steroid injection of subglottic stenosis on 2/1/18, and previously in 2013.    She had been lost to follow up and returned in Nov 2023 with sticky mucopurulent secretions. Based on culture data she was ultimately treated with ciprofloxacin PO and ciprodex nebs.      Today's updates:  - Antibiotics helped to mostly reduce the secretions, but they increased again after she completed the antibiotics; she is still doing the nebs.  - Steroids also seemed to be very helpful.  - Breathing is feeling better than it had been, but the secretions can be sticky and hard to cough out, and at those times breathing is harder. Nebs help a lot with the sticky secretions.  - There is a URI going around in her family. She is trying to avoid catching it.  - Voice is  substantially better.  - Swallow is stable.      MEDICATIONS:     Current Outpatient Medications   Medication Sig Dispense Refill     calcium carbonate-vitamin D 600-200 MG-UNIT TABS Take by mouth every morning        cetirizine (ZYRTEC) 10 MG tablet Take 10 mg by mouth At Bedtime        ciprofloxacin-dexAMETHasone (CIPRODEX) 0.3-0.1 % otic suspension Add 4 drops  in nebulizer. 7.5 mL 0     dextromethorphan-guaiFENesin (MUCINEX DM)  MG per 12 hr tablet Take 1 tablet by mouth as needed        EVENING PRIMROSE OIL PO Take by mouth daily (with lunch)       folic acid (FOLVITE) 1 MG tablet Take 1 mg by mouth every morning        ipratropium - albuterol 0.5 mg/2.5 mg/3 mL (DUONEB) 0.5-2.5 (3) MG/3ML nebulization Take 1 ampule by nebulization every 6 hours as needed       levothyroxine (SYNTHROID, LEVOTHROID) 50 MCG tablet Take 50 mcg by mouth every morning        lidocaine, PF, (XYLOCAINE) 2 % SOLN injection The following medication was given:     MEDICATION:  Lidocaine 2% Soln  ROUTE: Laryngeal gargle  SITE: Larynx  DOSE: 20 mL  LOT #: [id number]  : [facility]    EXPIRATION DATE: 5/1/2018  NDC#: 7342-4840-48   Was there drug waste? No    Suzy Sandoval RN   4/23/2018 1:42 PM       lidocaine, PF, (XYLOCAINE) 2 % SOLN injection The following medication was given:     MEDICATION:  Lidocaine 2% Soln  ROUTE: Laryngeal gargle  SITE: Larynx  DOSE: 18 mL  LOT #: [id number]  : [employer]   EXPIRATION DATE: 08/21  NDC#: 98278-631-57   Was there drug waste? Yes  Amount of drug waste (mL): 2.  Reason for waste:  Single use vial    Suzy Sandoval RN   3/9/2018 3:04 PM       Magnesium 400 MG TABS Take 400 mg by mouth At Bedtime       Melatonin 10 MG TABS tablet Take 10 mg by mouth At Bedtime       methotrexate 2.5 MG tablet Take 15 mg by mouth once a week ON FRIDAY       Multiple Vitamin (DAILY MULTIVITAMIN PO) Take by mouth daily (with lunch)        omeprazole 20 MG tablet Take 40 mg by mouth daily (with  lunch)        predniSONE (DELTASONE) 20 MG tablet Take 2.5 mg by mouth every morning        predniSONE (DELTASONE) 5 MG tablet Take (12) 5 mg daily tablets for two days, take (10) 5 mg daily for two days, take (9) 5 mg tablets for one day, take (6) 5 mg tablets for one day, take (4) 5 mg tablets for one day, take (2) 5 mg tablets for one day, take (1) 5 mg tablet one day, and then continue taking normal 2.5 daily dose. 67 tablet 1     sulfamethoxazole-trimethoprim (BACTRIM/SEPTRA) suspension Take 10 mg/kg/day by mouth 2 times daily Dose based on TMP component.       TRAZODONE HCL PO Take 50 mg by mouth At Bedtime        triamcinolone acetonide (KENALOG) 40 MG/ML injection The following medication was given:     MEDICATION:  Kenalog 40 mg/1mL  ROUTE: Injection   SITE: Subglottis  DOSE: 1.4 mL  LOT #: [id number] (4 vials)  : [employer]  EXPIRATION DATE: 12/2019  NDC#: [address]-2   Was there drug waste? Yes  Amount of drug waste (mL): 2.6  Reason for waste:  Single use vial      Suzy Sandoval RN   4/23/2018 1:40 PM 4 mL 0     triamcinolone acetonide (KENALOG) 40 MG/ML injection The following medication was given:     MEDICATION:  Kenalog 40 mg/1mL - 4 vials   ROUTE: Injection   SITE: Subglottis  DOSE: 2.6  LOT #: [id number]  : [employer]  EXPIRATION DATE: June 2019, August 2019   NDC#: 5579-1142-55   Was there drug waste? Yes  Amount of drug waste (mL): 1.4  Reason for waste:  Single use vial      Suzy Sandoval RN   3/9/2018 3:05 PM 4 mL 0       ALLERGIES:    Allergies   Allergen Reactions     Penicillins Anaphylaxis     Codeine Nausea and Vomiting     Codeine Camsylate Nausea and Vomiting     Tramadol GI Disturbance     Tramadol Hcl        NEW PMH/PSH: None    REVIEW OF SYSTEMS:  The patient completed a comprehensive 11 point review of systems (below), which was reviewed. Positives are as noted below.  Patient Supplied Answers to Review of  Systems  Noncontributory         PHYSICAL EXAM:  General: The patient was alert and conversant, and in no acute distress.    Neck: No palpable cervical lymphadenopathy, no significant tenderness to palpation of the thyrohyoid space, which was narrow. No obvious thyroid abnormality.  Resp: Breathing comfortably, mild to moderately noisy breathing at rest initially, which improved after coughing out mucus. Obviously noisy breathing with rapid inhalation.  Neuro: Symmetric facial function. Other cranial nerve function as documented above.  Psych: Normal affect, pleasant and cooperative.  Voice/speech: No significant dysphonia.      Procedure:   Flexible Bronchoscopy (45156)  Indications: This procedure was warranted to evaluate the patient's airway anatomy. Risks, benefits, and alternatives were discussed.  Description: After informed consent was obtained, a time-out was performed to confirm patient identity, procedure, and procedure site. Topical 3% lidocaine with 0.25% phenylephrine was applied to the nasal cavities and oropharynx. I performed the endoscopy and no complications were apparent.  Performed by: Marcelina Jordan MD MPH  Findings: Glottis patent with good bilateral vocal fold mobility. Slightly improved airway patency at the glottis, with no crusting. Subglottis still with moderate stenosis, significant inflammation and secretions. Trachea clear distally. Carol sharp. Unremarkable takeoffs of mainstem bronchi.                                IMPRESSION AND PLAN:   Susan Liu returns with some improvement of the sticky glottic secretions and considerable vocal improvement, but she still has substantial subglottic inflammation along with purulence/ crusting and narrowing. She had a partial response to antibiotics and steroids, and than has had some interval worsening since completing them. She is still using Ciprodex nebs.    Plan:  1) We will resume antibiotics and PO steroids for a longer course (3  weeks) this time, to try to medically manage this flare.   2) I asked her to contact Dr. Greene's office to clarify her medication regimen. Her GPA does not appear well controlled at present so I am asking her to check in with them regarding whether she should resume the methotrexate and/or if she should do something different for disease control. Our team also did put a call into their office to facilitate timely communication.  3) If medical management is insufficient, we will need to consider temporizing her airway in the OR, but she understands that the underlying issue of the GPA-related inflammation will still need to be addressed to prevent rapid recurrence and ongoing crusting/inflammation. She would prefer to avoid the OR if possible.  4) Needs saline for nebs; an rx will be provided for this as well. Also recommended humidifier, hydration.  5) RTC 3 weeks or sooner as needed; reviewed when to escalate for more urgent care/ER/911. Encouraged her to contact our office with any worsening/concerns.    I spent a total of 42 minutes on 12/4/2023 in chart review, review of tests, patient visit, documentation, care coordination, and/or discussion with other providers about the issues documented above, separate from any documented procedure(s).    Again, thank you for allowing me to participate in the care of your patient.      Sincerely,    Marcelina Jordan MD

## 2023-12-04 NOTE — PROGRESS NOTES
Dear Colleague:  Susan Liu recently returned for follow-up at the [facility]. My clinic note from our visit is enclosed below.  Speech recognition software may have been used in the documentation below; input is reviewed before signature to the best of my ability.     I appreciate the ongoing opportunity to participate in this patient's care.    Please feel free to contact me with any questions.      Sincerely yours,  Marcelina Jordan M.D., M.P.H.  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 She is s/p Microdirect laryngoscopy with incision, dilation, steroid injection of subglottic stenosis on 2/1/18, and previously in 2013.    She had been lost to follow up and returned in Nov 2023 with sticky mucopurulent secretions. Based on culture data she was ultimately treated with ciprofloxacin PO and ciprodex nebs.      Today's updates:  - Antibiotics helped to mostly reduce the secretions, but they increased again after she completed the antibiotics; she is still doing the nebs.  - Steroids also seemed to be very helpful.  - Breathing is feeling better than it had been, but the secretions can be sticky and hard to cough out, and at those times breathing is harder. Nebs help a lot with the sticky secretions.  - There is a URI going around in her family. She is trying to avoid catching it.  - Voice is substantially better.  - Swallow is stable.      MEDICATIONS:     Current Outpatient Medications   Medication Sig Dispense Refill    calcium carbonate-vitamin D 600-200 MG-UNIT TABS Take by mouth every morning       cetirizine (ZYRTEC) 10 MG tablet Take 10 mg by mouth At Bedtime       ciprofloxacin-dexAMETHasone  (CIPRODEX) 0.3-0.1 % otic suspension Add 4 drops  in nebulizer. 7.5 mL 0    dextromethorphan-guaiFENesin (MUCINEX DM)  MG per 12 hr tablet Take 1 tablet by mouth as needed       EVENING PRIMROSE OIL PO Take by mouth daily (with lunch)      folic acid (FOLVITE) 1 MG tablet Take 1 mg by mouth every morning       ipratropium - albuterol 0.5 mg/2.5 mg/3 mL (DUONEB) 0.5-2.5 (3) MG/3ML nebulization Take 1 ampule by nebulization every 6 hours as needed      levothyroxine (SYNTHROID, LEVOTHROID) 50 MCG tablet Take 50 mcg by mouth every morning       lidocaine, PF, (XYLOCAINE) 2 % SOLN injection The following medication was given:     MEDICATION:  Lidocaine 2% Soln  ROUTE: Laryngeal gargle  SITE: Larynx  DOSE: 20 mL  LOT #: [id number]  : [employer]    EXPIRATION DATE: 5/1/2018  NDC#: 3767-6679-55   Was there drug waste? No    Suzy Sandoval RN   4/23/2018 1:42 PM      lidocaine, PF, (XYLOCAINE) 2 % SOLN injection The following medication was given:     MEDICATION:  Lidocaine 2% Soln  ROUTE: Laryngeal gargle  SITE: Larynx  DOSE: 18 mL  LOT #: [id number]  : [employer]   EXPIRATION DATE: 08/21  NDC#: 65188-041-03   Was there drug waste? Yes  Amount of drug waste (mL): 2.  Reason for waste:  Single use vial    Suzy Sandoval RN   3/9/2018 3:04 PM      Magnesium 400 MG TABS Take 400 mg by mouth At Bedtime      Melatonin 10 MG TABS tablet Take 10 mg by mouth At Bedtime      methotrexate 2.5 MG tablet Take 15 mg by mouth once a week ON FRIDAY      Multiple Vitamin (DAILY MULTIVITAMIN PO) Take by mouth daily (with lunch)       omeprazole 20 MG tablet Take 40 mg by mouth daily (with lunch)       predniSONE (DELTASONE) 20 MG tablet Take 2.5 mg by mouth every morning       predniSONE (DELTASONE) 5 MG tablet Take (12) 5 mg daily tablets for two days, take (10) 5 mg daily for two days, take (9) 5 mg tablets for one day, take (6) 5 mg tablets for one day, take (4) 5 mg tablets for one day, take (2) 5 mg tablets  for one day, take (1) 5 mg tablet one day, and then continue taking normal 2.5 daily dose. 67 tablet 1    sulfamethoxazole-trimethoprim (BACTRIM/SEPTRA) suspension Take 10 mg/kg/day by mouth 2 times daily Dose based on TMP component.      TRAZODONE HCL PO Take 50 mg by mouth At Bedtime       triamcinolone acetonide (KENALOG) 40 MG/ML injection The following medication was given:     MEDICATION:  Kenalog 40 mg/1mL  ROUTE: Injection   SITE: Subglottis  DOSE: 1.4 mL  LOT #: [id number] (4 vials)  : [employer]  EXPIRATION DATE: 12/2019  NDC#: [address]-5   Was there drug waste? Yes  Amount of drug waste (mL): 2.6  Reason for waste:  Single use vial      Suzy Sandoval RN   4/23/2018 1:40 PM 4 mL 0    triamcinolone acetonide (KENALOG) 40 MG/ML injection The following medication was given:     MEDICATION:  Kenalog 40 mg/1mL - 4 vials   ROUTE: Injection   SITE: Subglottis  DOSE: 2.6  LOT #: [id number]  : [employer]  EXPIRATION DATE: June 2019, August 2019   NDC#: 4519-0961-27   Was there drug waste? Yes  Amount of drug waste (mL): 1.4  Reason for waste:  Single use vial      Suzy Sandoval RN   3/9/2018 3:05 PM 4 mL 0       ALLERGIES:    Allergies   Allergen Reactions    Penicillins Anaphylaxis    Codeine Nausea and Vomiting    Codeine Camsylate Nausea and Vomiting    Tramadol GI Disturbance    Tramadol Hcl        NEW PMH/PSH: None    REVIEW OF SYSTEMS:  The patient completed a comprehensive 11 point review of systems (below), which was reviewed. Positives are as noted below.  Patient Supplied Answers to Review of Systems  Noncontributory         PHYSICAL EXAM:  General: The patient was alert and conversant, and in no acute distress.    Neck: No palpable cervical lymphadenopathy, no significant tenderness to palpation of the thyrohyoid space, which was narrow. No obvious thyroid abnormality.  Resp: Breathing comfortably, mild to moderately noisy breathing at rest  initially, which improved after coughing out mucus. Obviously noisy breathing with rapid inhalation.  Neuro: Symmetric facial function. Other cranial nerve function as documented above.  Psych: Normal affect, pleasant and cooperative.  Voice/speech: No significant dysphonia.      Procedure:   Flexible Bronchoscopy (43619)  Indications: This procedure was warranted to evaluate the patient's airway anatomy. Risks, benefits, and alternatives were discussed.  Description: After informed consent was obtained, a time-out was performed to confirm patient identity, procedure, and procedure site. Topical 3% lidocaine with 0.25% phenylephrine was applied to the nasal cavities and oropharynx. I performed the endoscopy and no complications were apparent.  Performed by: Marcelina Jordan MD MPH  Findings: Glottis patent with good bilateral vocal fold mobility. Slightly improved airway patency at the glottis, with no crusting. Subglottis still with moderate stenosis, significant inflammation and secretions. Trachea clear distally. Carol sharp. Unremarkable takeoffs of mainstem bronchi.                                IMPRESSION AND PLAN:   Susan Liu returns with some improvement of the sticky glottic secretions and considerable vocal improvement, but she still has substantial subglottic inflammation along with purulence/ crusting and narrowing. She had a partial response to antibiotics and steroids, and than has had some interval worsening since completing them. She is still using Ciprodex nebs.    Plan:  1) We will resume antibiotics and PO steroids for a longer course (3 weeks) this time, to try to medically manage this flare.   2) I asked her to contact Dr. Greene's office to clarify her medication regimen. Her GPA does not appear well controlled at present so I am asking her to check in with them regarding whether she should resume the methotrexate and/or if she should do something different for disease control. Our team  also did put a call into their office to facilitate timely communication.  3) If medical management is insufficient, we will need to consider temporizing her airway in the OR, but she understands that the underlying issue of the GPA-related inflammation will still need to be addressed to prevent rapid recurrence and ongoing crusting/inflammation. She would prefer to avoid the OR if possible.  4) Needs saline for nebs; an rx will be provided for this as well. Also recommended humidifier, hydration.  5) RTC 3 weeks or sooner as needed; reviewed when to escalate for more urgent care/ER/911. Encouraged her to contact our office with any worsening/concerns.    I spent a total of 42 minutes on 12/4/2023 in chart review, review of tests, patient visit, documentation, care coordination, and/or discussion with other providers about the issues documented above, separate from any documented procedure(s).

## 2023-12-11 ENCOUNTER — PATIENT OUTREACH (OUTPATIENT)
Dept: OTOLARYNGOLOGY | Facility: CLINIC | Age: 73
End: 2023-12-11
Payer: COMMERCIAL

## 2023-12-11 NOTE — PROGRESS NOTES
Called patient to check in if her breathing was doing better. Patient did not answer and her VMB is full. Called patient daughter, and had to leave a message as well. Asked patient daughter to ask patient to call writer back and provided direct number. Virgie Ayala RN on 12/11/2023 at 9:32 AM

## 2023-12-13 ENCOUNTER — PATIENT OUTREACH (OUTPATIENT)
Dept: OTOLARYNGOLOGY | Facility: CLINIC | Age: 73
End: 2023-12-13
Payer: COMMERCIAL

## 2023-12-13 ENCOUNTER — TELEPHONE (OUTPATIENT)
Dept: OTOLARYNGOLOGY | Facility: CLINIC | Age: 73
End: 2023-12-13
Payer: COMMERCIAL

## 2023-12-13 NOTE — PROGRESS NOTES
Called patient and patient daughter to check in second attempt but did not have an answer from either. Patient mailbox is full and unable to take messages at this time. Virgie Ayala RN on 12/13/2023 at 8:24 AM

## 2023-12-13 NOTE — TELEPHONE ENCOUNTER
Left vm message for patient on home phone as her mobile vm was full. Left message regarding possibly moving pts upcoming appt to a later time.writers call back number was provided on vm.

## 2023-12-15 ENCOUNTER — PATIENT OUTREACH (OUTPATIENT)
Dept: OTOLARYNGOLOGY | Facility: CLINIC | Age: 73
End: 2023-12-15

## 2023-12-15 ENCOUNTER — OFFICE VISIT (OUTPATIENT)
Dept: OTOLARYNGOLOGY | Facility: CLINIC | Age: 73
End: 2023-12-15
Payer: COMMERCIAL

## 2023-12-15 VITALS
HEART RATE: 80 BPM | WEIGHT: 161 LBS | OXYGEN SATURATION: 99 % | SYSTOLIC BLOOD PRESSURE: 136 MMHG | BODY MASS INDEX: 29.45 KG/M2 | DIASTOLIC BLOOD PRESSURE: 84 MMHG

## 2023-12-15 DIAGNOSIS — M31.30 GRANULOMATOSIS WITH POLYANGIITIS, UNSPECIFIED WHETHER RENAL INVOLVEMENT (H): ICD-10-CM

## 2023-12-15 DIAGNOSIS — J38.3 OTHER DISEASES OF VOCAL CORDS: ICD-10-CM

## 2023-12-15 DIAGNOSIS — J38.6 SUBGLOTTIC STENOSIS: Primary | ICD-10-CM

## 2023-12-15 PROCEDURE — 99214 OFFICE O/P EST MOD 30 MIN: CPT | Mod: 25 | Performed by: OTOLARYNGOLOGY

## 2023-12-15 PROCEDURE — 31622 DX BRONCHOSCOPE/WASH: CPT | Performed by: OTOLARYNGOLOGY

## 2023-12-15 RX ORDER — SULFAMETHOXAZOLE AND TRIMETHOPRIM 400; 80 MG/1; MG/1
1 TABLET ORAL
COMMUNITY
Start: 2023-10-03

## 2023-12-15 RX ORDER — NEBULIZER
EACH MISCELLANEOUS SEE ADMIN INSTRUCTIONS
COMMUNITY
Start: 2023-12-04

## 2023-12-15 RX ORDER — BUDESONIDE 0.5 MG/2ML
INHALANT ORAL
COMMUNITY
Start: 2023-11-14

## 2023-12-15 RX ORDER — TRAZODONE HYDROCHLORIDE 50 MG/1
TABLET, FILM COATED ORAL
COMMUNITY
Start: 2023-10-03

## 2023-12-15 ASSESSMENT — PAIN SCALES - GENERAL: PAINLEVEL: NO PAIN (0)

## 2023-12-15 NOTE — PROGRESS NOTES
Talked with the RN for Dr. Greene that provider needed to speak with the provider as it is about the patient's high GPA, per nurse the only way the provider could get in touch was to leave a message and have provider review it when he had time. Writer tried to stress importance of information needing to be discussed however only the RN line was offered for contact. Updated provider. Virgie Ayala, RN on 12/15/2023 at 1:08 PM

## 2023-12-15 NOTE — LETTER
12/15/2023      RE: Susan Liu  [address] [place] [address]       Dear Colleague:  Susan Liu recently returned for follow-up at the [facility]. My clinic note from our visit is enclosed below.  Speech recognition software may have been used in the documentation below; input is reviewed before signature to the best of my ability.     I appreciate the ongoing opportunity to participate in this patient's care.    Please feel free to contact me with any questions.      Sincerely yours,  Marcelina Jordan M.D., M.P.H.  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 She is s/p Microdirect laryngoscopy with incision, dilation, steroid injection of subglottic stenosis on 2/1/18, and previously in 2013.    She had been lost to follow up and returned in Nov 2023 with sticky mucopurulent secretions. Based on culture data showing 3+ Staph pseud/intermedius, 2+ P aeruginosa, she was ultimately treated with ciprofloxacin and prednisone PO and ciprodex nebs. She initially had some improvement, but her symptoms worsened after she completed the antibiotics. Therefore we placed her on a longer course of antibiotics and steroids and advised her to seek timely care from her rheumatologist due to the ongoing flare. We also reached out to their office to alert them of her condition.    Today's updates:  - She felt very sick last week, but is feeling much better this week  - Helps to use the nebulizer, and feels that the PO meds are also helping. However if she does not use the nebulizer regularly she feels worse again.  - She did not contact her rheumatologist's office, she felt nervous about doing that, and has not  been seen yet  - Also, her sister is in hospice for lung cancer so she has been feeling preoccupied and sad        MEDICATIONS:     Current Outpatient Medications   Medication Sig Dispense Refill     ADVANCED EVENING PRIMROSE OIL OR Take  by mouth.       budesonide (PULMICORT) 0.5 MG/2ML neb solution NEBULIZE 1 UNIT DOSE BY MOUTH DAILY       calcium carbonate-vitamin D 600-200 MG-UNIT TABS Take by mouth every morning        cetirizine (ZYRTEC) 10 MG tablet Take 10 mg by mouth At Bedtime        ciprofloxacin (CIPRO) 500 MG tablet Take 1 tablet (500 mg) by mouth 2 times daily for 21 days 42 tablet 0     ciprofloxacin-dexAMETHasone (CIPRODEX) 0.3-0.1 % otic suspension Add 4 drops  in nebulizer. 7.5 mL 0     dextromethorphan-guaiFENesin (MUCINEX DM)  MG per 12 hr tablet Take 1 tablet by mouth as needed        EVENING PRIMROSE OIL PO Take by mouth daily (with lunch)       ipratropium - albuterol 0.5 mg/2.5 mg/3 mL (DUONEB) 0.5-2.5 (3) MG/3ML nebulization Take 1 ampule by nebulization every 6 hours as needed       levothyroxine (SYNTHROID, LEVOTHROID) 50 MCG tablet Take 50 mcg by mouth every morning        lidocaine, PF, (XYLOCAINE) 2 % SOLN injection The following medication was given:     MEDICATION:  Lidocaine 2% Soln  ROUTE: Laryngeal gargle  SITE: Larynx  DOSE: 20 mL  LOT #: [id number]  : [employer]    EXPIRATION DATE: 5/1/2018  NDC#: 4561-4323-49   Was there drug waste? No    Suzy Sandoval RN   4/23/2018 1:42 PM       lidocaine, PF, (XYLOCAINE) 2 % SOLN injection The following medication was given:     MEDICATION:  Lidocaine 2% Soln  ROUTE: Laryngeal gargle  SITE: Larynx  DOSE: 18 mL  LOT #: [id number]  : [facility]   EXPIRATION DATE: 08/21  NDC#: 42822-244-76   Was there drug waste? Yes  Amount of drug waste (mL): 2.  Reason for waste:  Single use vial    Suzy Sandoavl RN   3/9/2018 3:04 PM       Magnesium 400 MG TABS Take 400 mg by mouth At Bedtime       Melatonin 10 MG TABS tablet  Take 10 mg by mouth At Bedtime       Multiple Vitamin (DAILY MULTIVITAMIN PO) Take by mouth daily (with lunch)        omeprazole (PRILOSEC) 20 MG DR capsule TAKE 1 CAPSULE BY MOUTH DAILY 1 HOUR BEFORE A MEAL       omeprazole 20 MG tablet Take 40 mg by mouth daily (with lunch)        Lydia LC Sprint Nebulizer Set MISC See Admin Instructions       predniSONE (DELTASONE) 20 MG tablet Take 2.5 mg by mouth every morning        predniSONE (DELTASONE) 5 MG tablet Take (12) 5mg tablets for two days, then take (11) 5 mg tablets for two days, then take (10) 5 mg tablets for two days, then take (9) 5 mg tablets for two days, then take (8) 5 mg tablets for two days, then take (7) 5 mg tablets for two days, then take (6) 5 mg tablets for two days, then take (5) 5 mg tablets for two days, then take (4) 5 mg for two days, then take (3) 5 mg tablets for two days, then take (2) 5 mg tablets for two days. Then resume typical prednisone dose. 114 tablet 1     predniSONE (DELTASONE) 5 MG tablet Take (12) 5 mg daily tablets for two days, take (10) 5 mg daily for two days, take (9) 5 mg tablets for one day, take (6) 5 mg tablets for one day, take (4) 5 mg tablets for one day, take (2) 5 mg tablets for one day, take (1) 5 mg tablet one day, and then continue taking normal 2.5 daily dose. 67 tablet 1     sodium chloride 0.9 % neb solution Take 5 mLs by nebulization as needed for wheezing 500 mL 0     sulfamethoxazole-trimethoprim (BACTRIM) 400-80 MG tablet Take 1 tablet by mouth daily at 2 pm       sulfamethoxazole-trimethoprim (BACTRIM/SEPTRA) suspension Take 10 mg/kg/day by mouth 2 times daily Dose based on TMP component.       traZODone (DESYREL) 50 MG tablet        TRAZODONE HCL PO Take 50 mg by mouth At Bedtime        triamcinolone acetonide (KENALOG) 40 MG/ML injection The following medication was given:     MEDICATION:  Kenalog 40 mg/1mL  ROUTE: Injection   SITE: Subglottis  DOSE: 1.4 mL  LOT #: [id number] (4 vials)  :  [employer]  EXPIRATION DATE: 12/2019  NDC#: [address]-5   Was there drug waste? Yes  Amount of drug waste (mL): 2.6  Reason for waste:  Single use vial      Suzy Sandoval RN   4/23/2018 1:40 PM 4 mL 0     triamcinolone acetonide (KENALOG) 40 MG/ML injection The following medication was given:     MEDICATION:  Kenalog 40 mg/1mL - 4 vials   ROUTE: Injection   SITE: Subglottis  DOSE: 2.6  LOT #: [id number]  : [employer]  EXPIRATION DATE: June 2019, August 2019   NDC#: 9458-2176-98   Was there drug waste? Yes  Amount of drug waste (mL): 1.4  Reason for waste:  Single use vial      Suzy Sandoval RN   3/9/2018 3:05 PM 4 mL 0     folic acid (FOLVITE) 1 MG tablet Take 1 mg by mouth every morning  (Patient not taking: Reported on 12/15/2023)       methotrexate 2.5 MG tablet Take 15 mg by mouth once a week ON FRIDAY (Patient not taking: Reported on 12/15/2023)         ALLERGIES:    Allergies   Allergen Reactions     Penicillins Anaphylaxis     Codeine Nausea and Vomiting     Codeine Camsylate Nausea and Vomiting     Tramadol GI Disturbance     Tramadol Hcl        NEW PMH/PSH: None    REVIEW OF SYSTEMS:  The patient completed a comprehensive 11 point review of systems (below), which was reviewed. Positives are as noted below.  Patient Supplied Answers to Review of Systems Noncontributory           PHYSICAL EXAM:  General: The patient was alert and conversant, and in no acute distress.    Neck: No palpable cervical lymphadenopathy, no significant tenderness to palpation of the thyrohyoid space, which was narrow. No obvious thyroid abnormality.  Resp: Breathing comfortably, no stridor or stertor at rest, but with obvious noise on rapid inhalation e.g. during speech.  Neuro: Symmetric facial function. Other cranial nerve function as documented above.  Psych: Normal affect, pleasant and cooperative.  Voice/speech: No significant dysphonia.       Procedure:   Flexible Bronchoscopy  (00653)  Indications: This procedure was warranted to evaluate the patient's airway anatomy. Risks, benefits, and alternatives were discussed.  Description: After informed consent was obtained, a time-out was performed to confirm patient identity, procedure, and procedure site. Topical 3% lidocaine with 0.25% phenylephrine was applied to the nasal cavities and oropharynx. I performed the endoscopy and no complications were apparent.  Performed by: Marcelina Jordan MD MPH  Findings: Glottis patent with stable infraglottic stenosis, sticky secretions. Subglottis with mild to moderate narrowing, persistent inflammation, significant mucopurulent secretions, erythema. Trachea unremarkable distal to this inflamed region. Carol sharp. Unremarkable takeoffs of mainstem bronchi.                                     IMPRESSION AND PLAN:   Susan Liu returns with some interval improvement of her symptoms, but still has substantial subglottic inflammation and mucopurulence despite ongoing ciprofloxacin, high dose PO prednisone, and ciprodex nebs. We discussed that these findings are concerning for a flare of her underlying GPA rather than a diffuse laryngotracheitis, since the changes are localized to the upper airway region and her trachea appears spared. We also discussed that surgical intervention would have limited impact in this situation where the biggest problem is the sticky secretions and diffuse inflammation rather than a significant reduction in airway caliber. She is also having increased difficulty with sinus congestion, pressure, and drainage.     This is a difficult time for her given the airway issues as well as her sister's illness. We discussed consideration of hospital admission, but she feels she is doing well enough to continue with outpatient management, and she wants to be able to go visit her sister in hospice.    Plan:  1) Continue current medication regimen to try to temporize the airway while  hopefully achieving reduced inflammation with her rheumatologist's help.  2) I again encouraged her to contact her rheumatologist's office; we will also reach out to them again too to try to facilitate. Reminded her to make sure her voicemail allows people to leave messages, since her medical teams may be trying to contact her.  3) RTC with me in 3-4 weeks or sooner as needed, for ongoing airway monitoring. I reminded her that if her breathing gets worse she needs to notify us immediately and/or seek emergency care if it gets severe. She assured me that she understood.    I spent a total of 37 minutes on 12/15/2023 in chart review, review of tests, patient visit, documentation, care coordination, and/or discussion with other providers about the issues documented above, separate from any documented procedure(s).    =====  Addendum:  Spoke with Dr. Greene. He clarified that her methotrexate was stopped due to liver issues. He will arrange to see the patient soon to assess whether she might be a candidate for an alternative treatment approach such as rituximab. Will defer to him in this regard. Appreciate his input.      Marcelina Jordan MD

## 2023-12-15 NOTE — PROGRESS NOTES
Dear Colleague:  Susan Liu recently returned for follow-up at the [facility]. My clinic note from our visit is enclosed below.  Speech recognition software may have been used in the documentation below; input is reviewed before signature to the best of my ability.     I appreciate the ongoing opportunity to participate in this patient's care.    Please feel free to contact me with any questions.      Sincerely yours,  Marcelina Jordan M.D., M.P.H.  , Laryngology  Director, 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 She is s/p Microdirect laryngoscopy with incision, dilation, steroid injection of subglottic stenosis on 2/1/18, and previously in 2013.    She had been lost to follow up and returned in Nov 2023 with sticky mucopurulent secretions. Based on culture data showing 3+ Staph pseud/intermedius, 2+ P aeruginosa, she was ultimately treated with ciprofloxacin and prednisone PO and ciprodex nebs. She initially had some improvement, but her symptoms worsened after she completed the antibiotics. Therefore we placed her on a longer course of antibiotics and steroids and advised her to seek timely care from her rheumatologist due to the ongoing flare. We also reached out to their office to alert them of her condition.    Today's updates:  - She felt very sick last week, but is feeling much better this week  - Helps to use the nebulizer, and feels that the PO meds are also helping. However if she does not use the nebulizer regularly she feels worse again.  - She did not contact her rheumatologist's office, she felt nervous about doing that, and has not been seen yet  - Also, her sister is in hospice for lung cancer so she has  been feeling preoccupied and sad        MEDICATIONS:     Current Outpatient Medications   Medication Sig Dispense Refill    ADVANCED EVENING PRIMROSE OIL OR Take  by mouth.      budesonide (PULMICORT) 0.5 MG/2ML neb solution NEBULIZE 1 UNIT DOSE BY MOUTH DAILY      calcium carbonate-vitamin D 600-200 MG-UNIT TABS Take by mouth every morning       cetirizine (ZYRTEC) 10 MG tablet Take 10 mg by mouth At Bedtime       ciprofloxacin (CIPRO) 500 MG tablet Take 1 tablet (500 mg) by mouth 2 times daily for 21 days 42 tablet 0    ciprofloxacin-dexAMETHasone (CIPRODEX) 0.3-0.1 % otic suspension Add 4 drops  in nebulizer. 7.5 mL 0    dextromethorphan-guaiFENesin (MUCINEX DM)  MG per 12 hr tablet Take 1 tablet by mouth as needed       EVENING PRIMROSE OIL PO Take by mouth daily (with lunch)      ipratropium - albuterol 0.5 mg/2.5 mg/3 mL (DUONEB) 0.5-2.5 (3) MG/3ML nebulization Take 1 ampule by nebulization every 6 hours as needed      levothyroxine (SYNTHROID, LEVOTHROID) 50 MCG tablet Take 50 mcg by mouth every morning       lidocaine, PF, (XYLOCAINE) 2 % SOLN injection The following medication was given:     MEDICATION:  Lidocaine 2% Soln  ROUTE: Laryngeal gargle  SITE: Larynx  DOSE: 20 mL  LOT #: [id number]  : [employer]    EXPIRATION DATE: 5/1/2018  NDC#: 3758-9962-81   Was there drug waste? No    Suzy Sandoval RN   4/23/2018 1:42 PM      lidocaine, PF, (XYLOCAINE) 2 % SOLN injection The following medication was given:     MEDICATION:  Lidocaine 2% Soln  ROUTE: Laryngeal gargle  SITE: Larynx  DOSE: 18 mL  LOT #: [id number]  : [employer]   EXPIRATION DATE: 08/21  NDC#: 20085-909-96   Was there drug waste? Yes  Amount of drug waste (mL): 2.  Reason for waste:  Single use vial    Suzy Sandoval RN   3/9/2018 3:04 PM      Magnesium 400 MG TABS Take 400 mg by mouth At Bedtime      Melatonin 10 MG TABS tablet Take 10 mg by mouth At Bedtime      Multiple Vitamin (DAILY MULTIVITAMIN PO) Take by mouth  daily (with lunch)       omeprazole (PRILOSEC) 20 MG DR capsule TAKE 1 CAPSULE BY MOUTH DAILY 1 HOUR BEFORE A MEAL      omeprazole 20 MG tablet Take 40 mg by mouth daily (with lunch)       Lydia LC Sprint Nebulizer Set MISC See Admin Instructions      predniSONE (DELTASONE) 20 MG tablet Take 2.5 mg by mouth every morning       predniSONE (DELTASONE) 5 MG tablet Take (12) 5mg tablets for two days, then take (11) 5 mg tablets for two days, then take (10) 5 mg tablets for two days, then take (9) 5 mg tablets for two days, then take (8) 5 mg tablets for two days, then take (7) 5 mg tablets for two days, then take (6) 5 mg tablets for two days, then take (5) 5 mg tablets for two days, then take (4) 5 mg for two days, then take (3) 5 mg tablets for two days, then take (2) 5 mg tablets for two days. Then resume typical prednisone dose. 114 tablet 1    predniSONE (DELTASONE) 5 MG tablet Take (12) 5 mg daily tablets for two days, take (10) 5 mg daily for two days, take (9) 5 mg tablets for one day, take (6) 5 mg tablets for one day, take (4) 5 mg tablets for one day, take (2) 5 mg tablets for one day, take (1) 5 mg tablet one day, and then continue taking normal 2.5 daily dose. 67 tablet 1    sodium chloride 0.9 % neb solution Take 5 mLs by nebulization as needed for wheezing 500 mL 0    sulfamethoxazole-trimethoprim (BACTRIM) 400-80 MG tablet Take 1 tablet by mouth daily at 2 pm      sulfamethoxazole-trimethoprim (BACTRIM/SEPTRA) suspension Take 10 mg/kg/day by mouth 2 times daily Dose based on TMP component.      traZODone (DESYREL) 50 MG tablet       TRAZODONE HCL PO Take 50 mg by mouth At Bedtime       triamcinolone acetonide (KENALOG) 40 MG/ML injection The following medication was given:     MEDICATION:  Kenalog 40 mg/1mL  ROUTE: Injection   SITE: Subglottis  DOSE: 1.4 mL  LOT #: [id number] (4 vials)  : [employer]  EXPIRATION DATE: 12/2019  NDC#: [address]-5   Was there drug waste? Yes  Amount of  drug waste (mL): 2.6  Reason for waste:  Single use vial      Suzy Sandoval RN   4/23/2018 1:40 PM 4 mL 0    triamcinolone acetonide (KENALOG) 40 MG/ML injection The following medication was given:     MEDICATION:  Kenalog 40 mg/1mL - 4 vials   ROUTE: Injection   SITE: Subglottis  DOSE: 2.6  LOT #: [id number]  : [employer]  EXPIRATION DATE: June 2019, August 2019   NDC#: 4312-8378-11   Was there drug waste? Yes  Amount of drug waste (mL): 1.4  Reason for waste:  Single use vial      Suzy Sandoval RN   3/9/2018 3:05 PM 4 mL 0    folic acid (FOLVITE) 1 MG tablet Take 1 mg by mouth every morning  (Patient not taking: Reported on 12/15/2023)      methotrexate 2.5 MG tablet Take 15 mg by mouth once a week ON FRIDAY (Patient not taking: Reported on 12/15/2023)         ALLERGIES:    Allergies   Allergen Reactions    Penicillins Anaphylaxis    Codeine Nausea and Vomiting    Codeine Camsylate Nausea and Vomiting    Tramadol GI Disturbance    Tramadol Hcl        NEW PMH/PSH: None    REVIEW OF SYSTEMS:  The patient completed a comprehensive 11 point review of systems (below), which was reviewed. Positives are as noted below.  Patient Supplied Answers to Review of Systems Noncontributory           PHYSICAL EXAM:  General: The patient was alert and conversant, and in no acute distress.    Neck: No palpable cervical lymphadenopathy, no significant tenderness to palpation of the thyrohyoid space, which was narrow. No obvious thyroid abnormality.  Resp: Breathing comfortably, no stridor or stertor at rest, but with obvious noise on rapid inhalation e.g. during speech.  Neuro: Symmetric facial function. Other cranial nerve function as documented above.  Psych: Normal affect, pleasant and cooperative.  Voice/speech: No significant dysphonia.       Procedure:   Flexible Bronchoscopy (52884)  Indications: This procedure was warranted to evaluate the patient's airway anatomy. Risks, benefits, and  alternatives were discussed.  Description: After informed consent was obtained, a time-out was performed to confirm patient identity, procedure, and procedure site. Topical 3% lidocaine with 0.25% phenylephrine was applied to the nasal cavities and oropharynx. I performed the endoscopy and no complications were apparent.  Performed by: Marcelina Jordan MD MPH  Findings: Glottis patent with stable infraglottic stenosis, sticky secretions. Subglottis with mild to moderate narrowing, persistent inflammation, significant mucopurulent secretions, erythema. Trachea unremarkable distal to this inflamed region. Carol sharp. Unremarkable takeoffs of mainstem bronchi.                                     IMPRESSION AND PLAN:   Susan Liu returns with some interval improvement of her symptoms, but still has substantial subglottic inflammation and mucopurulence despite ongoing ciprofloxacin, high dose PO prednisone, and ciprodex nebs. We discussed that these findings are concerning for a flare of her underlying GPA rather than a diffuse laryngotracheitis, since the changes are localized to the upper airway region and her trachea appears spared. We also discussed that surgical intervention would have limited impact in this situation where the biggest problem is the sticky secretions and diffuse inflammation rather than a significant reduction in airway caliber. She is also having increased difficulty with sinus congestion, pressure, and drainage.     This is a difficult time for her given the airway issues as well as her sister's illness. We discussed consideration of hospital admission, but she feels she is doing well enough to continue with outpatient management, and she wants to be able to go visit her sister in hospice.    Plan:  1) Continue current medication regimen to try to temporize the airway while hopefully achieving reduced inflammation with her rheumatologist's help.  2) I again encouraged her to contact her  rheumatologist's office; we will also reach out to them again too to try to facilitate. Reminded her to make sure her voicemail allows people to leave messages, since her medical teams may be trying to contact her.  3) RTC with me in 3-4 weeks or sooner as needed, for ongoing airway monitoring. I reminded her that if her breathing gets worse she needs to notify us immediately and/or seek emergency care if it gets severe. She assured me that she understood.    I spent a total of 37 minutes on 12/15/2023 in chart review, review of tests, patient visit, documentation, care coordination, and/or discussion with other providers about the issues documented above, separate from any documented procedure(s).    =====  Addendum:  Spoke with Dr. Greene. He clarified that her methotrexate was stopped due to liver issues. He will arrange to see the patient soon to assess whether she might be a candidate for an alternative treatment approach such as rituximab. Will defer to him in this regard. Appreciate his input.

## 2023-12-15 NOTE — PATIENT INSTRUCTIONS
1.  You were seen in the ENT Clinic today by . If you have any questions or concerns after your appointment, please call [phone]. Press option #1 for scheduling related needs. Press option #3 for Nurse advice.    2.   Plan is to return to clinic as needed.      Katie Mckeon LPN  [phone]  [facility] Otolaryngology

## 2024-01-19 ENCOUNTER — OFFICE VISIT (OUTPATIENT)
Dept: OTOLARYNGOLOGY | Facility: CLINIC | Age: 74
End: 2024-01-19
Payer: COMMERCIAL

## 2024-01-19 VITALS
SYSTOLIC BLOOD PRESSURE: 146 MMHG | HEIGHT: 62 IN | BODY MASS INDEX: 29.81 KG/M2 | OXYGEN SATURATION: 97 % | HEART RATE: 87 BPM | WEIGHT: 162 LBS | DIASTOLIC BLOOD PRESSURE: 84 MMHG

## 2024-01-19 DIAGNOSIS — J38.6 SUBGLOTTIC STENOSIS: Primary | ICD-10-CM

## 2024-01-19 DIAGNOSIS — M31.30 GRANULOMATOSIS WITH POLYANGIITIS, UNSPECIFIED WHETHER RENAL INVOLVEMENT (H): ICD-10-CM

## 2024-01-19 DIAGNOSIS — Q31.0 LARYNGEAL WEB: ICD-10-CM

## 2024-01-19 PROCEDURE — 99214 OFFICE O/P EST MOD 30 MIN: CPT | Mod: 25 | Performed by: OTOLARYNGOLOGY

## 2024-01-19 PROCEDURE — 31622 DX BRONCHOSCOPE/WASH: CPT | Performed by: OTOLARYNGOLOGY

## 2024-01-19 RX ORDER — AZATHIOPRINE 50 MG/1
50 TABLET ORAL
COMMUNITY
Start: 2023-12-22 | End: 2024-12-21

## 2024-01-19 RX ORDER — PREDNISONE 10 MG/1
TABLET ORAL
COMMUNITY
Start: 2023-12-22

## 2024-01-19 NOTE — PATIENT INSTRUCTIONS
1.  You were seen in the ENT Clinic today by Dr. Jordan. If you have any questions or concerns after your appointment, please call [phone]. Press option #1 for scheduling related needs. Press option #3 for Nurse advice.    2.  Dr. Jordan has recommended the following:   - reach out with questions or concerns     3.  Plan is to return to clinic 4/19      Virgie Ayala RN  [phone]  [facility] - Otolaryngology

## 2024-01-19 NOTE — LETTER
1/19/2024      RE: Susan Liu  [address] [place] [address]       Dear Colleague:  Susan Liu recently returned for follow-up at the [facility]. My clinic note from our visit is enclosed below.  Speech recognition software may have been used in the documentation below; input is reviewed before signature to the best of my ability.     I appreciate the ongoing opportunity to participate in this patient's care.    Please feel free to contact me with any questions.      Sincerely yours,  Marcelina Jordan M.D., M.P.H.  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 She is s/p Microdirect laryngoscopy with incision, dilation, steroid injection of subglottic stenosis on 2/1/18, and previously in 2013.    She had been lost to follow up and returned in Nov 2023 with sticky mucopurulent secretions. Based on culture data showing 3+ Staph pseud/intermedius, 2+ P. aeruginosa, she was ultimately treated with ciprofloxacin and prednisone PO and ciprodex nebs. She initially had some improvement, but her symptoms worsened after she completed the antibiotics. Therefore we placed her on a longer course of antibiotics and steroids and advised her to seek timely care from her rheumatologist due to the ongoing flare. Dr Greene saw the patient 12/22/23 and recommended consideration of azathioprine; he noted that she previously did not tolerate methotrexate due to hepatic effects, and her insurance situation would likely lead to high costs with rituximab. He also recommended another prednisone burst, and completed a comprehensive screen/evaluation to assess for other contributing factors.      Today's updates:  - tolerating  azathioprine now after some initial nausea  - now is down to prednisone 10 mg q/day  - uses nebs a few times a week (reduced from multiple times a day), has much less mucus/crusting now  - completed antibiotics about 2 weeks ago, holding steady  - her breathing is much better, although she can still get short of breath with exertion  - her sister passed away shortly before [place] but she was able to spend time with her sister before she passed, which meant a lot        MEDICATIONS:     Current Outpatient Medications   Medication Sig Dispense Refill     ADVANCED EVENING PRIMROSE OIL OR Take  by mouth.       azaTHIOprine (IMURAN) 50 MG tablet Take 50 mg by mouth       budesonide (PULMICORT) 0.5 MG/2ML neb solution NEBULIZE 1 UNIT DOSE BY MOUTH DAILY       calcium carbonate-vitamin D 600-200 MG-UNIT TABS Take by mouth every morning        cetirizine (ZYRTEC) 10 MG tablet Take 10 mg by mouth At Bedtime        ciprofloxacin-dexAMETHasone (CIPRODEX) 0.3-0.1 % otic suspension Add 4 drops  in nebulizer. 7.5 mL 0     dextromethorphan-guaiFENesin (MUCINEX DM)  MG per 12 hr tablet Take 1 tablet by mouth as needed        EVENING PRIMROSE OIL PO Take by mouth daily (with lunch)       ipratropium - albuterol 0.5 mg/2.5 mg/3 mL (DUONEB) 0.5-2.5 (3) MG/3ML nebulization Take 1 ampule by nebulization every 6 hours as needed       levothyroxine (SYNTHROID, LEVOTHROID) 50 MCG tablet Take 50 mcg by mouth every morning        lidocaine, PF, (XYLOCAINE) 2 % SOLN injection The following medication was given:     MEDICATION:  Lidocaine 2% Soln  ROUTE: Laryngeal gargle  SITE: Larynx  DOSE: 20 mL  LOT #: [id number]  : [employer]    EXPIRATION DATE: 5/1/2018  NDC#: 4958-5721-31   Was there drug waste? No    Suzy Sandoval RN   4/23/2018 1:42 PM       lidocaine, PF, (XYLOCAINE) 2 % SOLN injection The following medication was given:     MEDICATION:  Lidocaine 2% Soln  ROUTE: Laryngeal gargle  SITE: Larynx  DOSE: 18 mL  LOT #:  [id number]  : [employer]   EXPIRATION DATE: 08/21  NDC#: 29512-089-91   Was there drug waste? Yes  Amount of drug waste (mL): 2.  Reason for waste:  Single use vial    Suzy Sandoval RN   3/9/2018 3:04 PM       Magnesium 400 MG TABS Take 400 mg by mouth At Bedtime       Melatonin 10 MG TABS tablet Take 10 mg by mouth At Bedtime       Multiple Vitamin (DAILY MULTIVITAMIN PO) Take by mouth daily (with lunch)        omeprazole (PRILOSEC) 20 MG DR capsule TAKE 1 CAPSULE BY MOUTH DAILY 1 HOUR BEFORE A MEAL       omeprazole 20 MG tablet Take 40 mg by mouth daily (with lunch)        Lydia LC Sprint Nebulizer Set MISC See Admin Instructions       predniSONE (DELTASONE) 10 MG tablet        predniSONE (DELTASONE) 20 MG tablet Take 2.5 mg by mouth every morning        predniSONE (DELTASONE) 5 MG tablet Take (12) 5mg tablets for two days, then take (11) 5 mg tablets for two days, then take (10) 5 mg tablets for two days, then take (9) 5 mg tablets for two days, then take (8) 5 mg tablets for two days, then take (7) 5 mg tablets for two days, then take (6) 5 mg tablets for two days, then take (5) 5 mg tablets for two days, then take (4) 5 mg for two days, then take (3) 5 mg tablets for two days, then take (2) 5 mg tablets for two days. Then resume typical prednisone dose. 114 tablet 1     predniSONE (DELTASONE) 5 MG tablet Take (12) 5 mg daily tablets for two days, take (10) 5 mg daily for two days, take (9) 5 mg tablets for one day, take (6) 5 mg tablets for one day, take (4) 5 mg tablets for one day, take (2) 5 mg tablets for one day, take (1) 5 mg tablet one day, and then continue taking normal 2.5 daily dose. 67 tablet 1     sodium chloride 0.9 % neb solution Take 5 mLs by nebulization as needed for wheezing 500 mL 0     sulfamethoxazole-trimethoprim (BACTRIM) 400-80 MG tablet Take 1 tablet by mouth daily at 2 pm       sulfamethoxazole-trimethoprim (BACTRIM/SEPTRA) suspension Take 10 mg/kg/day by mouth 2 times  daily Dose based on TMP component.       traZODone (DESYREL) 50 MG tablet        TRAZODONE HCL PO Take 50 mg by mouth At Bedtime        triamcinolone acetonide (KENALOG) 40 MG/ML injection The following medication was given:     MEDICATION:  Kenalog 40 mg/1mL  ROUTE: Injection   SITE: Subglottis  DOSE: 1.4 mL  LOT #: [id number] (4 vials)  : [employer]  EXPIRATION DATE: 12/2019  NDC#: [address]-5   Was there drug waste? Yes  Amount of drug waste (mL): 2.6  Reason for waste:  Single use vial      Suzy Sandoval RN   4/23/2018 1:40 PM 4 mL 0     triamcinolone acetonide (KENALOG) 40 MG/ML injection The following medication was given:     MEDICATION:  Kenalog 40 mg/1mL - 4 vials   ROUTE: Injection   SITE: Subglottis  DOSE: 2.6  LOT #: [id number]  : [employer]  EXPIRATION DATE: June 2019, August 2019   NDC#: 5816-6387-35   Was there drug waste? Yes  Amount of drug waste (mL): 1.4  Reason for waste:  Single use vial      Suzy Sandoval RN   3/9/2018 3:05 PM 4 mL 0     folic acid (FOLVITE) 1 MG tablet Take 1 mg by mouth every morning  (Patient not taking: Reported on 12/15/2023)       methotrexate 2.5 MG tablet Take 15 mg by mouth once a week ON FRIDAY (Patient not taking: Reported on 12/15/2023)         ALLERGIES:    Allergies   Allergen Reactions     Penicillins Anaphylaxis     Codeine Nausea and Vomiting     Codeine Camsylate Nausea and Vomiting     Tramadol GI Disturbance     Tramadol Hcl        NEW PMH/PSH: None    REVIEW OF SYSTEMS:  The patient completed a comprehensive 11 point review of systems (below), which was reviewed. Positives are as noted below.  Patient Supplied Answers to Review of Systems         PHYSICAL EXAM:  General: The patient was alert and conversant, and in no acute distress. Patient accompanied by her daughter.  Oral cavity/oropharynx: No masses or lesions. Tongue mobility and palate elevation intact and symmetric.  Neck: No palpable  cervical lymphadenopathy, no  tenderness to palpation of the thyrohyoid space, which was narrow. No obvious thyroid abnormality.  Resp: Breathing comfortably, no stridor or stertor. Audible noisy breathing with rapid inhale, less pronounced compared to prior.  Neuro: Symmetric facial function. Other cranial nerve function as documented above.  Psych: Normal affect, pleasant and cooperative.  Voice/speech: Mild dysphonia characterized by breathiness, roughness, and strain.          Procedure:   Flexible Bronchoscopy (86682)  Indications: This procedure was warranted to evaluate the patient's airway anatomy. Risks, benefits, and alternatives were discussed.  Description: After informed consent was obtained, a time-out was performed to confirm patient identity, procedure, and procedure site. Topical 3% lidocaine with 0.25% phenylephrine was applied to the nasal cavities and oropharynx. I performed the endoscopy and no complications were apparent.  Performed by: Marcelina Jordan MD MPH  Findings: Glottis patent with good bilateral vocal fold mobility. Stable small anterior infraglottic web. Stable posterior glottic web. Dramatically reduced secretions/crusting compared to prior. Subglottis with somewhat improved patency, decreased inflammation. Trachea clear distal to this area. Carol sharp. Unremarkable takeoffs of mainstem bronchi.                                                          IMPRESSION AND PLAN:   Susan Liu returns with significant interval improvement in her airway inflammation, purulence, and crusting following initiation of azathioprine. She is also symptomatically feeling much better. She will see her rheumatologist Dr Greene again next month.    Plan:  1) RTC with me in 2-3 months or sooner as needed  2) She will let me know if there are any concerns in the meantime. We also reminded her to make sure her voicemail box is not full so that people can leave messages as needed, and encouraged her to  make sure her MyChart is set up as well.      I spent a total of 32 minutes on 1/19/2024 in chart review, review of tests, patient visit, documentation, care coordination, and/or discussion with other providers about the issues documented above, separate from any documented procedure(s).      Marcelina Jordan MD

## 2024-01-19 NOTE — PROGRESS NOTES
Dear Colleague:  Susan Liu recently returned for follow-up at the [facility]. My clinic note from our visit is enclosed below.  Speech recognition software may have been used in the documentation below; input is reviewed before signature to the best of my ability.     I appreciate the ongoing opportunity to participate in this patient's care.    Please feel free to contact me with any questions.      Sincerely yours,  Marcelina Jordan M.D., M.P.H.  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 She is s/p Microdirect laryngoscopy with incision, dilation, steroid injection of subglottic stenosis on 2/1/18, and previously in 2013.    She had been lost to follow up and returned in Nov 2023 with sticky mucopurulent secretions. Based on culture data showing 3+ Staph pseud/intermedius, 2+ P. aeruginosa, she was ultimately treated with ciprofloxacin and prednisone PO and ciprodex nebs. She initially had some improvement, but her symptoms worsened after she completed the antibiotics. Therefore we placed her on a longer course of antibiotics and steroids and advised her to seek timely care from her rheumatologist due to the ongoing flare. Dr Greene saw the patient 12/22/23 and recommended consideration of azathioprine; he noted that she previously did not tolerate methotrexate due to hepatic effects, and her insurance situation would likely lead to high costs with rituximab. He also recommended another prednisone burst, and completed a comprehensive screen/evaluation to assess for other contributing factors.      Today's updates:  - tolerating azathioprine now after some initial nausea  - now is down to prednisone 10  mg q/day  - uses nebs a few times a week (reduced from multiple times a day), has much less mucus/crusting now  - completed antibiotics about 2 weeks ago, holding steady  - her breathing is much better, although she can still get short of breath with exertion  - her sister passed away shortly before Christmas but she was able to spend time with her sister before she passed, which meant a lot        MEDICATIONS:     Current Outpatient Medications   Medication Sig Dispense Refill    ADVANCED EVENING PRIMROSE OIL OR Take  by mouth.      azaTHIOprine (IMURAN) 50 MG tablet Take 50 mg by mouth      budesonide (PULMICORT) 0.5 MG/2ML neb solution NEBULIZE 1 UNIT DOSE BY MOUTH DAILY      calcium carbonate-vitamin D 600-200 MG-UNIT TABS Take by mouth every morning       cetirizine (ZYRTEC) 10 MG tablet Take 10 mg by mouth At Bedtime       ciprofloxacin-dexAMETHasone (CIPRODEX) 0.3-0.1 % otic suspension Add 4 drops  in nebulizer. 7.5 mL 0    dextromethorphan-guaiFENesin (MUCINEX DM)  MG per 12 hr tablet Take 1 tablet by mouth as needed       EVENING PRIMROSE OIL PO Take by mouth daily (with lunch)      ipratropium - albuterol 0.5 mg/2.5 mg/3 mL (DUONEB) 0.5-2.5 (3) MG/3ML nebulization Take 1 ampule by nebulization every 6 hours as needed      levothyroxine (SYNTHROID, LEVOTHROID) 50 MCG tablet Take 50 mcg by mouth every morning       lidocaine, PF, (XYLOCAINE) 2 % SOLN injection The following medication was given:     MEDICATION:  Lidocaine 2% Soln  ROUTE: Laryngeal gargle  SITE: Larynx  DOSE: 20 mL  LOT #: [id number]  : Hospira    EXPIRATION DATE: 5/1/2018  NDC#: 4449-4884-00   Was there drug waste? No    Suzy Sandoval RN   4/23/2018 1:42 PM      lidocaine, PF, (XYLOCAINE) 2 % SOLN injection The following medication was given:     MEDICATION:  Lidocaine 2% Soln  ROUTE: Laryngeal gargle  SITE: Larynx  DOSE: 18 mL  LOT #: [id number]  : [place]   EXPIRATION DATE: 08/21  NDC#: 94070-113-42   Was  there drug waste? Yes  Amount of drug waste (mL): 2.  Reason for waste:  Single use vial    Suzy Brownahon, RN   3/9/2018 3:04 PM      Magnesium 400 MG TABS Take 400 mg by mouth At Bedtime      Melatonin 10 MG TABS tablet Take 10 mg by mouth At Bedtime      Multiple Vitamin (DAILY MULTIVITAMIN PO) Take by mouth daily (with lunch)       omeprazole (PRILOSEC) 20 MG DR capsule TAKE 1 CAPSULE BY MOUTH DAILY 1 HOUR BEFORE A MEAL      omeprazole 20 MG tablet Take 40 mg by mouth daily (with lunch)       Lydia LC Sprint Nebulizer Set MISC See Admin Instructions      predniSONE (DELTASONE) 10 MG tablet       predniSONE (DELTASONE) 20 MG tablet Take 2.5 mg by mouth every morning       predniSONE (DELTASONE) 5 MG tablet Take (12) 5mg tablets for two days, then take (11) 5 mg tablets for two days, then take (10) 5 mg tablets for two days, then take (9) 5 mg tablets for two days, then take (8) 5 mg tablets for two days, then take (7) 5 mg tablets for two days, then take (6) 5 mg tablets for two days, then take (5) 5 mg tablets for two days, then take (4) 5 mg for two days, then take (3) 5 mg tablets for two days, then take (2) 5 mg tablets for two days. Then resume typical prednisone dose. 114 tablet 1    predniSONE (DELTASONE) 5 MG tablet Take (12) 5 mg daily tablets for two days, take (10) 5 mg daily for two days, take (9) 5 mg tablets for one day, take (6) 5 mg tablets for one day, take (4) 5 mg tablets for one day, take (2) 5 mg tablets for one day, take (1) 5 mg tablet one day, and then continue taking normal 2.5 daily dose. 67 tablet 1    sodium chloride 0.9 % neb solution Take 5 mLs by nebulization as needed for wheezing 500 mL 0    sulfamethoxazole-trimethoprim (BACTRIM) 400-80 MG tablet Take 1 tablet by mouth daily at 2 pm      sulfamethoxazole-trimethoprim (BACTRIM/SEPTRA) suspension Take 10 mg/kg/day by mouth 2 times daily Dose based on TMP component.      traZODone (DESYREL) 50 MG tablet       TRAZODONE HCL PO Take 50  mg by mouth At Bedtime       triamcinolone acetonide (KENALOG) 40 MG/ML injection The following medication was given:     MEDICATION:  Kenalog 40 mg/1mL  ROUTE: Injection   SITE: Subglottis  DOSE: 1.4 mL  LOT #: [id number] (4 vials)  : [employer]  EXPIRATION DATE: 12/2019  NDC#: [address]-5   Was there drug waste? Yes  Amount of drug waste (mL): 2.6  Reason for waste:  Single use vial      Suzy Sandoval RN   4/23/2018 1:40 PM 4 mL 0    triamcinolone acetonide (KENALOG) 40 MG/ML injection The following medication was given:     MEDICATION:  Kenalog 40 mg/1mL - 4 vials   ROUTE: Injection   SITE: Subglottis  DOSE: 2.6  LOT #: [id number]  : [employer]  EXPIRATION DATE: June 2019, August 2019   NDC#: 5123-5939-04   Was there drug waste? Yes  Amount of drug waste (mL): 1.4  Reason for waste:  Single use vial      Suzy Sandoval RN   3/9/2018 3:05 PM 4 mL 0    folic acid (FOLVITE) 1 MG tablet Take 1 mg by mouth every morning  (Patient not taking: Reported on 12/15/2023)      methotrexate 2.5 MG tablet Take 15 mg by mouth once a week ON FRIDAY (Patient not taking: Reported on 12/15/2023)         ALLERGIES:    Allergies   Allergen Reactions    Penicillins Anaphylaxis    Codeine Nausea and Vomiting    Codeine Camsylate Nausea and Vomiting    Tramadol GI Disturbance    Tramadol Hcl        NEW PMH/PSH: None    REVIEW OF SYSTEMS:  The patient completed a comprehensive 11 point review of systems (below), which was reviewed. Positives are as noted below.  Patient Supplied Answers to Review of Systems         PHYSICAL EXAM:  General: The patient was alert and conversant, and in no acute distress. Patient accompanied by her daughter.  Oral cavity/oropharynx: No masses or lesions. Tongue mobility and palate elevation intact and symmetric.  Neck: No palpable cervical lymphadenopathy, no  tenderness to palpation of the thyrohyoid space, which was narrow. No obvious thyroid  abnormality.  Resp: Breathing comfortably, no stridor or stertor. Audible noisy breathing with rapid inhale, less pronounced compared to prior.  Neuro: Symmetric facial function. Other cranial nerve function as documented above.  Psych: Normal affect, pleasant and cooperative.  Voice/speech: Mild dysphonia characterized by breathiness, roughness, and strain.          Procedure:   Flexible Bronchoscopy (74105)  Indications: This procedure was warranted to evaluate the patient's airway anatomy. Risks, benefits, and alternatives were discussed.  Description: After informed consent was obtained, a time-out was performed to confirm patient identity, procedure, and procedure site. Topical 3% lidocaine with 0.25% phenylephrine was applied to the nasal cavities and oropharynx. I performed the endoscopy and no complications were apparent.  Performed by: Marcelina Jordan MD MPH  Findings: Glottis patent with good bilateral vocal fold mobility. Stable small anterior infraglottic web. Stable posterior glottic web. Dramatically reduced secretions/crusting compared to prior. Subglottis with somewhat improved patency, decreased inflammation. Trachea clear distal to this area. Carol sharp. Unremarkable takeoffs of mainstem bronchi.                                                          IMPRESSION AND PLAN:   Susan Liu returns with significant interval improvement in her airway inflammation, purulence, and crusting following initiation of azathioprine. She is also symptomatically feeling much better. She will see her rheumatologist Dr Greene again next month.    Plan:  1) RTC with me in 2-3 months or sooner as needed  2) She will let me know if there are any concerns in the meantime. We also reminded her to make sure her voicemail box is not full so that people can leave messages as needed, and encouraged her to make sure her MyChart is set up as well.      I spent a total of 32 minutes on 1/19/2024 in chart review, review of  tests, patient visit, documentation, care coordination, and/or discussion with other providers about the issues documented above, separate from any documented procedure(s).

## 2024-01-21 ENCOUNTER — HEALTH MAINTENANCE LETTER (OUTPATIENT)
Age: 74
End: 2024-01-21

## 2024-04-19 ENCOUNTER — OFFICE VISIT (OUTPATIENT)
Dept: OTOLARYNGOLOGY | Facility: CLINIC | Age: 74
End: 2024-04-19
Payer: COMMERCIAL

## 2024-04-19 ENCOUNTER — MYC MEDICAL ADVICE (OUTPATIENT)
Dept: OTOLARYNGOLOGY | Facility: CLINIC | Age: 74
End: 2024-04-19

## 2024-04-19 VITALS — WEIGHT: 162 LBS | HEIGHT: 62 IN | BODY MASS INDEX: 29.81 KG/M2

## 2024-04-19 DIAGNOSIS — Q31.0 LARYNGEAL WEB: ICD-10-CM

## 2024-04-19 DIAGNOSIS — J38.6 SUBGLOTTIC STENOSIS: Primary | ICD-10-CM

## 2024-04-19 DIAGNOSIS — M31.30 GRANULOMATOSIS WITH POLYANGIITIS, UNSPECIFIED WHETHER RENAL INVOLVEMENT (H): ICD-10-CM

## 2024-04-19 DIAGNOSIS — R06.02 SHORTNESS OF BREATH: ICD-10-CM

## 2024-04-19 DIAGNOSIS — J38.6 SUBGLOTTIC STENOSIS: ICD-10-CM

## 2024-04-19 PROCEDURE — 31622 DX BRONCHOSCOPE/WASH: CPT | Performed by: OTOLARYNGOLOGY

## 2024-04-19 PROCEDURE — 99215 OFFICE O/P EST HI 40 MIN: CPT | Mod: 25 | Performed by: OTOLARYNGOLOGY

## 2024-04-19 NOTE — NURSING NOTE
Provided peak flow meter and instructions for use. Patient was agreeable and verbalized understanding of the situation. Patient will reach out with any other questions or concerns in the meantime. Virgie Ayala RN on 4/19/2024 at 10:06 AM

## 2024-04-19 NOTE — PROGRESS NOTES
Dear Colleague:  Susan Liu recently returned for follow-up at the [facility]. My clinic note from our visit is enclosed below.  Speech recognition software may have been used in the documentation below; input is reviewed before signature to the best of my ability.     I appreciate the ongoing opportunity to participate in this patient's care.    Please feel free to contact me with any questions.      Sincerely yours,  Marcelina Jordan M.D., M.P.H.  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 She is s/p Microdirect laryngoscopy with incision, dilation, steroid injection of subglottic stenosis on 2/1/18, and previously in 2013.    She had been lost to follow up and returned in Nov 2023 with sticky mucopurulent secretions. Based on culture data showing 3+ Staph pseud/intermedius, 2+ P. aeruginosa, she was ultimately treated with ciprofloxacin and prednisone PO and ciprodex nebs. She initially had some improvement, but her symptoms worsened after she completed the antibiotics. Therefore we placed her on a longer course of antibiotics and steroids and advised her to seek timely care from her rheumatologist due to the ongoing flare. Dr Greene saw the patient 12/22/23 and recommended consideration of azathioprine; he noted that she previously did not tolerate methotrexate due to hepatic effects, and her insurance situation would likely lead to high costs with rituximab. He also recommended another prednisone burst, and completed a comprehensive screen/evaluation to assess for other contributing factors.        Today's updates:  - saw Dr Greene Rheumatology Feb 2024; discussed rituximab although there are cost  concerns. In the meantime, on azathioprine with prednisone gradual taper to be completed in May  - they tried to explore exact rituximab pricing but the prescription had not gone through so they were unable to find out whether there might be better priced options. Daughter works in a pharmacy so has some knowledge of various programs that might be able to help.  - voice is stable  - swallowing - states that she needs to cut things up small; solids get stuck about once a week, but not liquids. Things do not generally get down the wrong tube.  - allergies are making her have worse mucus/congestion  - breathing - feels okay; feels she is breathing better compared to when she had heavy sticky secretions but does still feel somewhat limited; can go half a block or up one flight of stairs before getting short of breath.  - gets tired out in general, not sure why      MEDICATIONS:     Current Outpatient Medications   Medication Sig Dispense Refill    ADVANCED EVENING PRIMROSE OIL OR Take  by mouth.      azaTHIOprine (IMURAN) 50 MG tablet Take 50 mg by mouth      budesonide (PULMICORT) 0.5 MG/2ML neb solution NEBULIZE 1 UNIT DOSE BY MOUTH DAILY      calcium carbonate-vitamin D 600-200 MG-UNIT TABS Take by mouth every morning       cetirizine (ZYRTEC) 10 MG tablet Take 10 mg by mouth At Bedtime       ciprofloxacin-dexAMETHasone (CIPRODEX) 0.3-0.1 % otic suspension Add 4 drops  in nebulizer. 7.5 mL 0    dextromethorphan-guaiFENesin (MUCINEX DM)  MG per 12 hr tablet Take 1 tablet by mouth as needed       EVENING PRIMROSE OIL PO Take by mouth daily (with lunch)      ipratropium - albuterol 0.5 mg/2.5 mg/3 mL (DUONEB) 0.5-2.5 (3) MG/3ML nebulization Take 1 ampule by nebulization every 6 hours as needed      levothyroxine (SYNTHROID, LEVOTHROID) 50 MCG tablet Take 50 mcg by mouth every morning       lidocaine, PF, (XYLOCAINE) 2 % SOLN injection The following medication was given:     MEDICATION:  Lidocaine 2% Soln  ROUTE:  Laryngeal gargle  SITE: Larynx  DOSE: 20 mL  LOT #: [id number]  : Hospira    EXPIRATION DATE: 5/1/2018  NDC#: 2050-8568-98   Was there drug waste? No    Suzy Sandoval RN   4/23/2018 1:42 PM      lidocaine, PF, (XYLOCAINE) 2 % SOLN injection The following medication was given:     MEDICATION:  Lidocaine 2% Soln  ROUTE: Laryngeal gargle  SITE: Larynx  DOSE: 18 mL  LOT #: [id number]  : SKURA   EXPIRATION DATE: 08/21  NDC#: 67458-777-98   Was there drug waste? Yes  Amount of drug waste (mL): 2.  Reason for waste:  Single use vial    Suzy Sandoval RN   3/9/2018 3:04 PM      Magnesium 400 MG TABS Take 400 mg by mouth At Bedtime      Melatonin 10 MG TABS tablet Take 10 mg by mouth At Bedtime      Multiple Vitamin (DAILY MULTIVITAMIN PO) Take by mouth daily (with lunch)       omeprazole (PRILOSEC) 20 MG DR capsule TAKE 1 CAPSULE BY MOUTH DAILY 1 HOUR BEFORE A MEAL      omeprazole 20 MG tablet Take 40 mg by mouth daily (with lunch)       Lydia LC Sprint Nebulizer Set MISC See Admin Instructions      predniSONE (DELTASONE) 10 MG tablet       predniSONE (DELTASONE) 20 MG tablet Take 2.5 mg by mouth every morning       predniSONE (DELTASONE) 5 MG tablet Take (12) 5mg tablets for two days, then take (11) 5 mg tablets for two days, then take (10) 5 mg tablets for two days, then take (9) 5 mg tablets for two days, then take (8) 5 mg tablets for two days, then take (7) 5 mg tablets for two days, then take (6) 5 mg tablets for two days, then take (5) 5 mg tablets for two days, then take (4) 5 mg for two days, then take (3) 5 mg tablets for two days, then take (2) 5 mg tablets for two days. Then resume typical prednisone dose. 114 tablet 1    predniSONE (DELTASONE) 5 MG tablet Take (12) 5 mg daily tablets for two days, take (10) 5 mg daily for two days, take (9) 5 mg tablets for one day, take (6) 5 mg tablets for one day, take (4) 5 mg tablets for one day, take (2) 5 mg tablets for one day, take (1) 5 mg  tablet one day, and then continue taking normal 2.5 daily dose. 67 tablet 1    sodium chloride 0.9 % neb solution Take 5 mLs by nebulization as needed for wheezing 500 mL 0    sulfamethoxazole-trimethoprim (BACTRIM) 400-80 MG tablet Take 1 tablet by mouth daily at 2 pm      sulfamethoxazole-trimethoprim (BACTRIM/SEPTRA) suspension Take 10 mg/kg/day by mouth 2 times daily Dose based on TMP component.      traZODone (DESYREL) 50 MG tablet       TRAZODONE HCL PO Take 50 mg by mouth At Bedtime       triamcinolone acetonide (KENALOG) 40 MG/ML injection The following medication was given:     MEDICATION:  Kenalog 40 mg/1mL  ROUTE: Injection   SITE: Subglottis  DOSE: 1.4 mL  LOT #: [id number] (4 vials)  : [employer]  EXPIRATION DATE: 12/2019  NDC#: [address]-9   Was there drug waste? Yes  Amount of drug waste (mL): 2.6  Reason for waste:  Single use vial      Suzy Sandoval RN   4/23/2018 1:40 PM 4 mL 0    triamcinolone acetonide (KENALOG) 40 MG/ML injection The following medication was given:     MEDICATION:  Kenalog 40 mg/1mL - 4 vials   ROUTE: Injection   SITE: Subglottis  DOSE: 2.6  LOT #: [id number]  : [employer]  EXPIRATION DATE: June 2019, August 2019   NDC#: 5987-1682-10   Was there drug waste? Yes  Amount of drug waste (mL): 1.4  Reason for waste:  Single use vial      Suzy Sandoval RN   3/9/2018 3:05 PM 4 mL 0    folic acid (FOLVITE) 1 MG tablet Take 1 mg by mouth every morning  (Patient not taking: Reported on 12/15/2023)      methotrexate 2.5 MG tablet Take 15 mg by mouth once a week ON FRIDAY (Patient not taking: Reported on 12/15/2023)         ALLERGIES:    Allergies   Allergen Reactions    Penicillins Anaphylaxis    Codeine Nausea and Vomiting    Codeine Camsylate Nausea and Vomiting    Tramadol GI Disturbance    Tramadol Hcl        NEW PMH/PSH: None    REVIEW OF SYSTEMS:  The patient completed a comprehensive 11 point review of systems (below),  which was reviewed. Positives are as noted below.  Patient Supplied Answers to Review of Systems  Noncontributory       PHYSICAL EXAM:  General: The patient was alert and conversant, and in no acute distress. Patient accompanied by her daughter and granddaughter.  Oral cavity/oropharynx: No masses or lesions. Edentulous. Tongue mobility and palate elevation intact and symmetric.  Neck: No palpable cervical lymphadenopathy, no significant tenderness to palpation of the thyrohyoid space, which was narrow. No obvious thyroid abnormality.  Resp: Breathing comfortably, no stridor or stertor. Mildly audible breathing, biphasic.  Neuro: Symmetric facial function. Other cranial nerve function as documented above.  Psych: Normal affect, pleasant and cooperative.  Voice/speech: No significant dysphonia.      Procedure:   Flexible Bronchoscopy (00710)  Indications: This procedure was warranted to evaluate the patient's airway anatomy. Risks, benefits, and alternatives were discussed.  Description: After informed consent was obtained, a time-out was performed to confirm patient identity, procedure, and procedure site. Topical 3% lidocaine with 0.25% phenylephrine was applied to the nasal cavities and oropharynx. I performed the endoscopy and no complications were apparent.  Performed by: Marcelina Jordan MD MPH  Findings: Glottis with good bilateral vocal fold mobility. 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 Subglottis with stable moderate stenosis. Minimal yellowish secretions today. Trachea clear distally. Carol sharp. Unremarkable takeoffs of mainstem bronchi.                                                              IMPRESSION AND PLAN:   Susan Liu returns with obvious progression of the infraglottic stenosis; the subglottic stenosis is stable. She is now on a prednisone taper, almost completed, and in retrospect feels that her breathing has worsened as  she has reduced her dosing.    She will touch base with Dr Greene's office to inquire whether a different medication approach could be considered (e.g. increase in prednisone dosing) and/or whether a prescription for rituximab could be submitted. We discussed that we will need to consider surgery unless new dosing/medications lead to rapid improvement.     Will place case request for Micro direct laryngoscopy with incision, CO2 laser, steroid injection, possible dilation under general anesthesia; can cancel or postpone if she has a response to any medical management changes. Concern for future recurrence noted. Unfortunately there is also concern for injury to voice/swallowing given that the laryngeal webs are continuous with the infraglottic true vocal folds, but if breathing worsens further we will need to proceed. Reviewed other surgical risks including injury to mouth/throat/airway as well as need for pre-op history and physical. She and her daughter understood.    I spent a total of 42 minutes on 4/19/2024 in chart review, review of tests, patient visit, documentation, care coordination, and/or discussion with other providers about the issues documented above, separate from any documented procedure(s).

## 2024-04-19 NOTE — LETTER
4/19/2024      RE: Susan Liu  [address] [place] [address]       Dear Colleague:  Susan Liu recently returned for follow-up at the [facility]. My clinic note from our visit is enclosed below.  Speech recognition software may have been used in the documentation below; input is reviewed before signature to the best of my ability.     I appreciate the ongoing opportunity to participate in this patient's care.    Please feel free to contact me with any questions.      Sincerely yours,  Marcelina Jordan M.D., M.P.H.  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 She is s/p Microdirect laryngoscopy with incision, dilation, steroid injection of subglottic stenosis on 2/1/18, and previously in 2013.    She had been lost to follow up and returned in Nov 2023 with sticky mucopurulent secretions. Based on culture data showing 3+ Staph pseud/intermedius, 2+ P. aeruginosa, she was ultimately treated with ciprofloxacin and prednisone PO and ciprodex nebs. She initially had some improvement, but her symptoms worsened after she completed the antibiotics. Therefore we placed her on a longer course of antibiotics and steroids and advised her to seek timely care from her rheumatologist due to the ongoing flare. Dr Greene saw the patient 12/22/23 and recommended consideration of azathioprine; he noted that she previously did not tolerate methotrexate due to hepatic effects, and her insurance situation would likely lead to high costs with rituximab. He also recommended another prednisone burst, and completed a comprehensive screen/evaluation to assess for other contributing factors.        Today's updates:  - saw   [facility] Feb 2024; discussed rituximab although there are cost concerns. In the meantime, on azathioprine with prednisone gradual taper to be completed in May  - they tried to explore exact rituximab pricing but the prescription had not gone through so they were unable to find out whether there might be better priced options. Daughter works in a pharmacy so has some knowledge of various programs that might be able to help.  - voice is stable  - swallowing - states that she needs to cut things up small; solids get stuck about once a week, but not liquids. Things do not generally get down the wrong tube.  - allergies are making her have worse mucus/congestion  - breathing - feels okay; feels she is breathing better compared to when she had heavy sticky secretions but does still feel somewhat limited; can go half a block or up one flight of stairs before getting short of breath.  - gets tired out in general, not sure why      MEDICATIONS:     Current Outpatient Medications   Medication Sig Dispense Refill    ADVANCED EVENING PRIMROSE OIL OR Take  by mouth.      azaTHIOprine (IMURAN) 50 MG tablet Take 50 mg by mouth      budesonide (PULMICORT) 0.5 MG/2ML neb solution NEBULIZE 1 UNIT DOSE BY MOUTH DAILY      calcium carbonate-vitamin D 600-200 MG-UNIT TABS Take by mouth every morning       cetirizine (ZYRTEC) 10 MG tablet Take 10 mg by mouth At Bedtime       ciprofloxacin-dexAMETHasone (CIPRODEX) 0.3-0.1 % otic suspension Add 4 drops  in nebulizer. 7.5 mL 0    dextromethorphan-guaiFENesin (MUCINEX DM)  MG per 12 hr tablet Take 1 tablet by mouth as needed       EVENING PRIMROSE OIL PO Take by mouth daily (with lunch)      ipratropium - albuterol 0.5 mg/2.5 mg/3 mL (DUONEB) 0.5-2.5 (3) MG/3ML nebulization Take 1 ampule by nebulization every 6 hours as needed      levothyroxine (SYNTHROID, LEVOTHROID) 50 MCG tablet Take 50 mcg by mouth every morning       lidocaine, PF, (XYLOCAINE) 2 % SOLN injection The  following medication was given:     MEDICATION:  Lidocaine 2% Soln  ROUTE: Laryngeal gargle  SITE: Larynx  DOSE: 20 mL  LOT #: [id number]  : Hosp[employer]    EXPIRATION DATE: 5/1/2018  NDC#: 4769-2569-38   Was there drug waste? No    Suzy Sandoval RN   4/23/2018 1:42 PM      lidocaine, PF, (XYLOCAINE) 2 % SOLN injection The following medication was given:     MEDICATION:  Lidocaine 2% Soln  ROUTE: Laryngeal gargle  SITE: Larynx  DOSE: 18 mL  LOT #: [id number]  : qcue   EXPIRATION DATE: 08/21  NDC#: 19861-901-74   Was there drug waste? Yes  Amount of drug waste (mL): 2.  Reason for waste:  Single use vial    Suzy Sandoval RN   3/9/2018 3:04 PM      Magnesium 400 MG TABS Take 400 mg by mouth At Bedtime      Melatonin 10 MG TABS tablet Take 10 mg by mouth At Bedtime      Multiple Vitamin (DAILY MULTIVITAMIN PO) Take by mouth daily (with lunch)       omeprazole (PRILOSEC) 20 MG DR capsule TAKE 1 CAPSULE BY MOUTH DAILY 1 HOUR BEFORE A MEAL      omeprazole 20 MG tablet Take 40 mg by mouth daily (with lunch)       Lydia LC Sprint Nebulizer Set MISC See Admin Instructions      predniSONE (DELTASONE) 10 MG tablet       predniSONE (DELTASONE) 20 MG tablet Take 2.5 mg by mouth every morning       predniSONE (DELTASONE) 5 MG tablet Take (12) 5mg tablets for two days, then take (11) 5 mg tablets for two days, then take (10) 5 mg tablets for two days, then take (9) 5 mg tablets for two days, then take (8) 5 mg tablets for two days, then take (7) 5 mg tablets for two days, then take (6) 5 mg tablets for two days, then take (5) 5 mg tablets for two days, then take (4) 5 mg for two days, then take (3) 5 mg tablets for two days, then take (2) 5 mg tablets for two days. Then resume typical prednisone dose. 114 tablet 1    predniSONE (DELTASONE) 5 MG tablet Take (12) 5 mg daily tablets for two days, take (10) 5 mg daily for two days, take (9) 5 mg tablets for one day, take (6) 5 mg tablets for one day, take (4)  5 mg tablets for one day, take (2) 5 mg tablets for one day, take (1) 5 mg tablet one day, and then continue taking normal 2.5 daily dose. 67 tablet 1    sodium chloride 0.9 % neb solution Take 5 mLs by nebulization as needed for wheezing 500 mL 0    sulfamethoxazole-trimethoprim (BACTRIM) 400-80 MG tablet Take 1 tablet by mouth daily at 2 pm      sulfamethoxazole-trimethoprim (BACTRIM/SEPTRA) suspension Take 10 mg/kg/day by mouth 2 times daily Dose based on TMP component.      traZODone (DESYREL) 50 MG tablet       TRAZODONE HCL PO Take 50 mg by mouth At Bedtime       triamcinolone acetonide (KENALOG) 40 MG/ML injection The following medication was given:     MEDICATION:  Kenalog 40 mg/1mL  ROUTE: Injection   SITE: Subglottis  DOSE: 1.4 mL  LOT #: [id number] (4 vials)  : [employer]  EXPIRATION DATE: 12/2019  NDC#: [address]-7   Was there drug waste? Yes  Amount of drug waste (mL): 2.6  Reason for waste:  Single use vial      Suzy Sandoval RN   4/23/2018 1:40 PM 4 mL 0    triamcinolone acetonide (KENALOG) 40 MG/ML injection The following medication was given:     MEDICATION:  Kenalog 40 mg/1mL - 4 vials   ROUTE: Injection   SITE: Subglottis  DOSE: 2.6  LOT #: [id number]  : [employer]  EXPIRATION DATE: June 2019, August 2019   NDC#: 0569-6269-46   Was there drug waste? Yes  Amount of drug waste (mL): 1.4  Reason for waste:  Single use vial      Suzy Sandoval RN   3/9/2018 3:05 PM 4 mL 0    folic acid (FOLVITE) 1 MG tablet Take 1 mg by mouth every morning  (Patient not taking: Reported on 12/15/2023)      methotrexate 2.5 MG tablet Take 15 mg by mouth once a week ON FRIDAY (Patient not taking: Reported on 12/15/2023)         ALLERGIES:    Allergies   Allergen Reactions    Penicillins Anaphylaxis    Codeine Nausea and Vomiting    Codeine Camsylate Nausea and Vomiting    Tramadol GI Disturbance    Tramadol Hcl        NEW PMH/PSH: None    REVIEW OF SYSTEMS:  The  patient completed a comprehensive 11 point review of systems (below), which was reviewed. Positives are as noted below.  Patient Supplied Answers to Review of Systems  Noncontributory       PHYSICAL EXAM:  General: The patient was alert and conversant, and in no acute distress. Patient accompanied by her daughter and granddaughter.  Oral cavity/oropharynx: No masses or lesions. Edentulous. Tongue mobility and palate elevation intact and symmetric.  Neck: No palpable cervical lymphadenopathy, no significant tenderness to palpation of the thyrohyoid space, which was narrow. No obvious thyroid abnormality.  Resp: Breathing comfortably, no stridor or stertor. Mildly audible breathing, biphasic.  Neuro: Symmetric facial function. Other cranial nerve function as documented above.  Psych: Normal affect, pleasant and cooperative.  Voice/speech: No significant dysphonia.      Procedure:   Flexible Bronchoscopy (40190)  Indications: This procedure was warranted to evaluate the patient's airway anatomy. Risks, benefits, and alternatives were discussed.  Description: After informed consent was obtained, a time-out was performed to confirm patient identity, procedure, and procedure site. Topical 3% lidocaine with 0.25% phenylephrine was applied to the nasal cavities and oropharynx. I performed the endoscopy and no complications were apparent.  Performed by: Marcelina Jordan MD MPH  Findings: Glottis with good bilateral vocal fold mobility. 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 Subglottis with stable moderate stenosis. Minimal yellowish secretions today. Trachea clear distally. Carol sharp. Unremarkable takeoffs of mainstem bronchi.                                                              IMPRESSION AND PLAN:   Susan Liu returns with obvious progression of the infraglottic stenosis; the subglottic stenosis is stable. She is now on a prednisone taper, almost  completed, and in retrospect feels that her breathing has worsened as she has reduced her dosing.    She will touch base with Dr Greene's office to inquire whether a different medication approach could be considered (e.g. increase in prednisone dosing) and/or whether a prescription for rituximab could be submitted. We discussed that we will need to consider surgery unless new dosing/medications lead to rapid improvement.     Will place case request for Micro direct laryngoscopy with incision, CO2 laser, steroid injection, possible dilation under general anesthesia; can cancel or postpone if she has a response to any medical management changes. Concern for future recurrence noted. Unfortunately there is also concern for injury to voice/swallowing given that the laryngeal webs are continuous with the infraglottic true vocal folds, but if breathing worsens further we will need to proceed. Reviewed other surgical risks including injury to mouth/throat/airway as well as need for pre-op history and physical. She and her daughter understood.    I spent a total of 42 minutes on 4/19/2024 in chart review, review of tests, patient visit, documentation, care coordination, and/or discussion with other providers about the issues documented above, separate from any documented procedure(s).      Marcelina Jordan MD

## 2024-04-19 NOTE — PATIENT INSTRUCTIONS
1.  You were seen in the ENT Clinic today by Dr. Jordan. If you have any questions or concerns after your appointment, please call [phone]. Press option #1 for scheduling related needs. Press option #3 for Nurse advice.    2.  Dr. Jordan has recommended the following:   - May need to consider surgery unless new dosing/medications lead to rapid improvement. Concern for future recurrence noted. Concern for injury to voice/swallowing given high level and association with vocal folds, but if breathing worsens further we will need to proceed. She will touch base with Dr Greene's office to inquire.     -Will place case request for Micro direct laryngoscopy with incision, CO2 laser, steroid injection, possible dilation; can cancel or postpone if she has a response to any medical management changes.    3.  Plan is to return to clinic will establish plan after meeting with rheumatology       How to Contact Us:  Send a [facility] message to your provider. Our team will respond to you via [employer]. Occasionally, we will need to call you to get further information.  For urgent matters (Monday-Friday, 8:00 AM-3:30 PM), call the ENT Clinic: [phone] and speak with a call center team member - they will route your call appropriately.   If you'd like to speak directly with a nurse, please call [phone]. We do our best to check voicemail frequently throughout the day, and will work to call you back within 1-2 days. For urgent matters, please use the general clinic phone numbers listed above.      Virgie Ayala RN  [phone]  [facility] - Otolaryngology

## 2024-04-22 RX ORDER — PREDNISONE 5 MG/1
TABLET ORAL
Qty: 114 TABLET | Refills: 1 | Status: SHIPPED | OUTPATIENT
Start: 2024-04-22

## 2024-04-23 ENCOUNTER — PATIENT OUTREACH (OUTPATIENT)
Dept: OTOLARYNGOLOGY | Facility: CLINIC | Age: 74
End: 2024-04-23
Payer: COMMERCIAL

## 2024-04-23 NOTE — PROGRESS NOTES
Called to check in on patient. VMB is full so unable to leave a voicemail. Will follow up on MyChart. Virgie Ayala RN on 4/23/2024 at 7:55 AM

## 2024-04-23 NOTE — PROGRESS NOTES
Called patient daughter to follow up about patient's breathing. Per patient daughter the patient started the  increased steroid dose yesterday and her breathing is doing well. Patient did not have to take Mucinex to help with breathing when typically she does. Patient daughter will follow up over lunch and have patient call if anything changes. Writer educated patient daughter on how to reach out with any questions or concerns. Virgie Ayala RN on 4/23/2024 at 9:00 AM     see above

## 2024-04-24 ENCOUNTER — TELEPHONE (OUTPATIENT)
Dept: OTOLARYNGOLOGY | Facility: CLINIC | Age: 74
End: 2024-04-24
Payer: COMMERCIAL

## 2024-04-24 NOTE — TELEPHONE ENCOUNTER
Left patients daughter a voicemail to schedule surgery for Microdirect laryngoscopy with incision, steroid injection, possible dilation, possible mitomycin, possible CO2 laser, for airway stenosis (N/A) with Dr. Jordan - [facility] Scheduling line for callback [phone]    Fatou Mandujano on 4/24/2024 at 11:54 AM

## 2024-04-25 NOTE — TELEPHONE ENCOUNTER
Left patients daughter another voicemail to schedule surgery for Microdirect laryngoscopy with incision, steroid injection, possible dilation, possible mitomycin, possible CO2 laser, for airway stenosis (N/A) with Dr. Jordan - [facility] Scheduling line for callback [phone]

## 2024-04-29 NOTE — TELEPHONE ENCOUNTER
Scheduled patients 3 week post op on 7/5 at 12:30 PM    Fatou Mandujano Perioperative Coordinator 4/29/2024 at 1:42 PM

## 2024-04-29 NOTE — TELEPHONE ENCOUNTER
Patients daughter called in to confirm surgery date for Susan. Provided her an 830am arrival on 5/2. She said she was not able to get in with a new PCP until 5/6. Offered for her to have a PAC instead. She said she might want to reschedule surgery so she can see the new PCP. Advised her that it might be a few weeks before Susan can have surgery if she can't do this Thursday. She stated that she is going to call her mom and see what she wants to do and then call us back    Fatou Mandujano, Perioperative Coordinator 4/29/2024 at 1:02 PM

## 2024-04-29 NOTE — TELEPHONE ENCOUNTER
Scheduled surgery with Dr. Jordan on 6/6/2024 per Patients request. States 5/2 is too soon    Spoke with: RONALDO JAMA (Daughter)     Surgery is located at [facility]    Patient will be seen for their H&P by their PCP Dr. Wise within 30 days of surgery    Anesthesia type: General    Requested Imaging required for surgery: Not Applicable     Patient needs scheduled for their 3 week post op    Patient will receive their surgery packet via [employer] per their preference    Patient was not provided a start time for surgery & is aware they will receive this information 2-3 days before surgery    Additional comments: Patient will call back if they have any questions or concerns.    Fatou Mandujano on 4/29/2024 at 1:06 PM

## 2024-06-03 ENCOUNTER — PATIENT OUTREACH (OUTPATIENT)
Dept: OTOLARYNGOLOGY | Facility: CLINIC | Age: 74
End: 2024-06-03
Payer: COMMERCIAL

## 2024-06-03 NOTE — PROGRESS NOTES
Called patient to talk about breathing coming up to the surgery. Per patient breathing is about the same as when she was in clinic, and still feels SOB with mild activity. Writer communicated that we will still plan to have surgery on 6/6 and that someone will reach out to tell her the time to arrive. Writer educated patient on how to reach out to clinic with questions or concerns in the meantime. Patient was agreeable and verbalized understanding of the situation. Virgie Ayala RN on 6/3/2024 at 12:59 PM

## 2024-06-04 ENCOUNTER — PREP FOR PROCEDURE (OUTPATIENT)
Dept: OTOLARYNGOLOGY | Facility: CLINIC | Age: 74
End: 2024-06-04
Payer: COMMERCIAL

## 2024-06-05 ENCOUNTER — MYC MEDICAL ADVICE (OUTPATIENT)
Dept: SURGERY | Facility: CLINIC | Age: 74
End: 2024-06-05
Payer: COMMERCIAL

## 2024-06-06 ENCOUNTER — ANESTHESIA EVENT (OUTPATIENT)
Dept: SURGERY | Facility: CLINIC | Age: 74
End: 2024-06-06
Payer: COMMERCIAL

## 2024-06-06 ENCOUNTER — ANESTHESIA (OUTPATIENT)
Dept: SURGERY | Facility: CLINIC | Age: 74
End: 2024-06-06
Payer: COMMERCIAL

## 2024-06-06 ENCOUNTER — HOSPITAL ENCOUNTER (OUTPATIENT)
Facility: CLINIC | Age: 74
Discharge: HOME | End: 2024-06-06
Attending: OTOLARYNGOLOGY | Admitting: OTOLARYNGOLOGY
Payer: COMMERCIAL

## 2024-06-06 VITALS
TEMPERATURE: 97.6 F | HEIGHT: 60 IN | WEIGHT: 163.58 LBS | OXYGEN SATURATION: 99 % | HEART RATE: 77 BPM | DIASTOLIC BLOOD PRESSURE: 79 MMHG | SYSTOLIC BLOOD PRESSURE: 170 MMHG | RESPIRATION RATE: 18 BRPM | BODY MASS INDEX: 32.12 KG/M2

## 2024-06-06 DIAGNOSIS — M31.30 GRANULOMATOSIS WITH POLYANGIITIS, UNSPECIFIED WHETHER RENAL INVOLVEMENT (H): ICD-10-CM

## 2024-06-06 DIAGNOSIS — J38.6 SUBGLOTTIC STENOSIS: Primary | ICD-10-CM

## 2024-06-06 DIAGNOSIS — J04.10 TRACHEITIS: ICD-10-CM

## 2024-06-06 PROCEDURE — 272N000001 HC OR GENERAL SUPPLY STERILE: Performed by: OTOLARYNGOLOGY

## 2024-06-06 PROCEDURE — C1726 CATH, BAL DIL, NON-VASCULAR: HCPCS | Performed by: OTOLARYNGOLOGY

## 2024-06-06 PROCEDURE — 88305 TISSUE EXAM BY PATHOLOGIST: CPT | Mod: 26 | Performed by: STUDENT IN AN ORGANIZED HEALTH CARE EDUCATION/TRAINING PROGRAM

## 2024-06-06 PROCEDURE — 250N000011 HC RX IP 250 OP 636: Performed by: NURSE ANESTHETIST, CERTIFIED REGISTERED

## 2024-06-06 PROCEDURE — 99100 ANES PT EXTEME AGE<1 YR&>70: CPT | Performed by: ANESTHESIOLOGY

## 2024-06-06 PROCEDURE — 99100 ANES PT EXTEME AGE<1 YR&>70: CPT | Performed by: NURSE ANESTHETIST, CERTIFIED REGISTERED

## 2024-06-06 PROCEDURE — 370N000017 HC ANESTHESIA TECHNICAL FEE, PER MIN: Performed by: OTOLARYNGOLOGY

## 2024-06-06 PROCEDURE — 88305 TISSUE EXAM BY PATHOLOGIST: CPT | Mod: TC | Performed by: OTOLARYNGOLOGY

## 2024-06-06 PROCEDURE — 31541 LARYNSCOP W/TUMR EXC + SCOPE: CPT | Performed by: ANESTHESIOLOGY

## 2024-06-06 PROCEDURE — 710N000010 HC RECOVERY PHASE 1, LEVEL 2, PER MIN: Performed by: OTOLARYNGOLOGY

## 2024-06-06 PROCEDURE — 87070 CULTURE OTHR SPECIMN AEROBIC: CPT | Performed by: OTOLARYNGOLOGY

## 2024-06-06 PROCEDURE — 250N000009 HC RX 250: Performed by: NURSE ANESTHETIST, CERTIFIED REGISTERED

## 2024-06-06 PROCEDURE — 258N000003 HC RX IP 258 OP 636: Mod: JZ | Performed by: NURSE ANESTHETIST, CERTIFIED REGISTERED

## 2024-06-06 PROCEDURE — 250N000009 HC RX 250: Performed by: OTOLARYNGOLOGY

## 2024-06-06 PROCEDURE — 999N000141 HC STATISTIC PRE-PROCEDURE NURSING ASSESSMENT: Performed by: OTOLARYNGOLOGY

## 2024-06-06 PROCEDURE — 87102 FUNGUS ISOLATION CULTURE: CPT | Performed by: OTOLARYNGOLOGY

## 2024-06-06 PROCEDURE — 360N000077 HC SURGERY LEVEL 4, PER MIN: Performed by: OTOLARYNGOLOGY

## 2024-06-06 PROCEDURE — 250N000011 HC RX IP 250 OP 636: Performed by: OTOLARYNGOLOGY

## 2024-06-06 PROCEDURE — 710N000012 HC RECOVERY PHASE 2, PER MINUTE: Performed by: OTOLARYNGOLOGY

## 2024-06-06 PROCEDURE — 272N000002 HC OR SUPPLY OTHER OPNP: Performed by: OTOLARYNGOLOGY

## 2024-06-06 PROCEDURE — 31541 LARYNSCOP W/TUMR EXC + SCOPE: CPT | Performed by: NURSE ANESTHETIST, CERTIFIED REGISTERED

## 2024-06-06 RX ORDER — LIDOCAINE HYDROCHLORIDE 20 MG/ML
INJECTION, SOLUTION INFILTRATION; PERINEURAL PRN
Status: DISCONTINUED | OUTPATIENT
Start: 2024-06-06 | End: 2024-06-06

## 2024-06-06 RX ORDER — TRIAMCINOLONE ACETONIDE 40 MG/ML
INJECTION, SUSPENSION INTRA-ARTICULAR; INTRAMUSCULAR PRN
Status: DISCONTINUED | OUTPATIENT
Start: 2024-06-06 | End: 2024-06-06 | Stop reason: HOSPADM

## 2024-06-06 RX ORDER — EPINEPHRINE 0.1 MG/ML
INJECTION INTRAVENOUS PRN
Status: DISCONTINUED | OUTPATIENT
Start: 2024-06-06 | End: 2024-06-06 | Stop reason: HOSPADM

## 2024-06-06 RX ORDER — CLINDAMYCIN PHOSPHATE 900 MG/50ML
900 INJECTION, SOLUTION INTRAVENOUS SEE ADMIN INSTRUCTIONS
Status: DISCONTINUED | OUTPATIENT
Start: 2024-06-06 | End: 2024-06-06 | Stop reason: HOSPADM

## 2024-06-06 RX ORDER — ONDANSETRON 2 MG/ML
4 INJECTION INTRAMUSCULAR; INTRAVENOUS EVERY 30 MIN PRN
Status: DISCONTINUED | OUTPATIENT
Start: 2024-06-06 | End: 2024-06-06 | Stop reason: HOSPADM

## 2024-06-06 RX ORDER — SODIUM CHLORIDE, SODIUM LACTATE, POTASSIUM CHLORIDE, CALCIUM CHLORIDE 600; 310; 30; 20 MG/100ML; MG/100ML; MG/100ML; MG/100ML
INJECTION, SOLUTION INTRAVENOUS CONTINUOUS PRN
Status: DISCONTINUED | OUTPATIENT
Start: 2024-06-06 | End: 2024-06-06

## 2024-06-06 RX ORDER — FENTANYL CITRATE 50 UG/ML
INJECTION, SOLUTION INTRAMUSCULAR; INTRAVENOUS PRN
Status: DISCONTINUED | OUTPATIENT
Start: 2024-06-06 | End: 2024-06-06

## 2024-06-06 RX ORDER — ACETAMINOPHEN 325 MG/1
975 TABLET ORAL
Status: DISCONTINUED | OUTPATIENT
Start: 2024-06-06 | End: 2024-06-06 | Stop reason: HOSPADM

## 2024-06-06 RX ORDER — HYDROXYZINE HYDROCHLORIDE 10 MG/1
10 TABLET, FILM COATED ORAL EVERY 6 HOURS PRN
Status: DISCONTINUED | OUTPATIENT
Start: 2024-06-06 | End: 2024-06-06 | Stop reason: HOSPADM

## 2024-06-06 RX ORDER — DEXAMETHASONE SODIUM PHOSPHATE 4 MG/ML
INJECTION, SOLUTION INTRA-ARTICULAR; INTRALESIONAL; INTRAMUSCULAR; INTRAVENOUS; SOFT TISSUE PRN
Status: DISCONTINUED | OUTPATIENT
Start: 2024-06-06 | End: 2024-06-06

## 2024-06-06 RX ORDER — DEXAMETHASONE SODIUM PHOSPHATE 10 MG/ML
10 INJECTION, SOLUTION INTRAMUSCULAR; INTRAVENOUS ONCE
Status: DISCONTINUED | OUTPATIENT
Start: 2024-06-06 | End: 2024-06-06 | Stop reason: HOSPADM

## 2024-06-06 RX ORDER — DEXAMETHASONE SODIUM PHOSPHATE 4 MG/ML
4 INJECTION, SOLUTION INTRA-ARTICULAR; INTRALESIONAL; INTRAMUSCULAR; INTRAVENOUS; SOFT TISSUE
Status: DISCONTINUED | OUTPATIENT
Start: 2024-06-06 | End: 2024-06-06 | Stop reason: HOSPADM

## 2024-06-06 RX ORDER — PROPOFOL 10 MG/ML
INJECTION, EMULSION INTRAVENOUS PRN
Status: DISCONTINUED | OUTPATIENT
Start: 2024-06-06 | End: 2024-06-06

## 2024-06-06 RX ORDER — MEPERIDINE HYDROCHLORIDE 25 MG/ML
12.5 INJECTION INTRAMUSCULAR; INTRAVENOUS; SUBCUTANEOUS EVERY 5 MIN PRN
Status: DISCONTINUED | OUTPATIENT
Start: 2024-06-06 | End: 2024-06-06 | Stop reason: HOSPADM

## 2024-06-06 RX ORDER — ONDANSETRON 4 MG/1
4 TABLET, ORALLY DISINTEGRATING ORAL EVERY 30 MIN PRN
Status: DISCONTINUED | OUTPATIENT
Start: 2024-06-06 | End: 2024-06-06 | Stop reason: HOSPADM

## 2024-06-06 RX ORDER — SODIUM CHLORIDE, SODIUM LACTATE, POTASSIUM CHLORIDE, CALCIUM CHLORIDE 600; 310; 30; 20 MG/100ML; MG/100ML; MG/100ML; MG/100ML
INJECTION, SOLUTION INTRAVENOUS CONTINUOUS
Status: DISCONTINUED | OUTPATIENT
Start: 2024-06-06 | End: 2024-06-06 | Stop reason: HOSPADM

## 2024-06-06 RX ORDER — NALOXONE HYDROCHLORIDE 0.4 MG/ML
0.1 INJECTION, SOLUTION INTRAMUSCULAR; INTRAVENOUS; SUBCUTANEOUS
Status: DISCONTINUED | OUTPATIENT
Start: 2024-06-06 | End: 2024-06-06 | Stop reason: HOSPADM

## 2024-06-06 RX ORDER — LIDOCAINE HYDROCHLORIDE 40 MG/ML
SOLUTION TOPICAL PRN
Status: DISCONTINUED | OUTPATIENT
Start: 2024-06-06 | End: 2024-06-06 | Stop reason: HOSPADM

## 2024-06-06 RX ORDER — CLINDAMYCIN PHOSPHATE 900 MG/50ML
900 INJECTION, SOLUTION INTRAVENOUS
Status: COMPLETED | OUTPATIENT
Start: 2024-06-06 | End: 2024-06-06

## 2024-06-06 RX ORDER — ALBUTEROL SULFATE 0.83 MG/ML
2.5 SOLUTION RESPIRATORY (INHALATION) EVERY 4 HOURS PRN
Status: DISCONTINUED | OUTPATIENT
Start: 2024-06-06 | End: 2024-06-06 | Stop reason: HOSPADM

## 2024-06-06 RX ORDER — OXYCODONE HYDROCHLORIDE 10 MG/1
10 TABLET ORAL
Status: DISCONTINUED | OUTPATIENT
Start: 2024-06-06 | End: 2024-06-06 | Stop reason: HOSPADM

## 2024-06-06 RX ORDER — HYDROMORPHONE HCL IN WATER/PF 6 MG/30 ML
0.4 PATIENT CONTROLLED ANALGESIA SYRINGE INTRAVENOUS EVERY 5 MIN PRN
Status: DISCONTINUED | OUTPATIENT
Start: 2024-06-06 | End: 2024-06-06 | Stop reason: HOSPADM

## 2024-06-06 RX ORDER — PROPOFOL 10 MG/ML
INJECTION, EMULSION INTRAVENOUS CONTINUOUS PRN
Status: DISCONTINUED | OUTPATIENT
Start: 2024-06-06 | End: 2024-06-06

## 2024-06-06 RX ORDER — LORAZEPAM 2 MG/ML
.5-1 INJECTION INTRAMUSCULAR
Status: DISCONTINUED | OUTPATIENT
Start: 2024-06-06 | End: 2024-06-06 | Stop reason: HOSPADM

## 2024-06-06 RX ORDER — ONDANSETRON 2 MG/ML
INJECTION INTRAMUSCULAR; INTRAVENOUS PRN
Status: DISCONTINUED | OUTPATIENT
Start: 2024-06-06 | End: 2024-06-06

## 2024-06-06 RX ORDER — FENTANYL CITRATE 50 UG/ML
25 INJECTION, SOLUTION INTRAMUSCULAR; INTRAVENOUS EVERY 5 MIN PRN
Status: DISCONTINUED | OUTPATIENT
Start: 2024-06-06 | End: 2024-06-06 | Stop reason: HOSPADM

## 2024-06-06 RX ORDER — HYDROMORPHONE HCL IN WATER/PF 6 MG/30 ML
0.2 PATIENT CONTROLLED ANALGESIA SYRINGE INTRAVENOUS EVERY 5 MIN PRN
Status: DISCONTINUED | OUTPATIENT
Start: 2024-06-06 | End: 2024-06-06 | Stop reason: HOSPADM

## 2024-06-06 RX ORDER — FENTANYL CITRATE 50 UG/ML
50 INJECTION, SOLUTION INTRAMUSCULAR; INTRAVENOUS EVERY 5 MIN PRN
Status: DISCONTINUED | OUTPATIENT
Start: 2024-06-06 | End: 2024-06-06 | Stop reason: HOSPADM

## 2024-06-06 RX ORDER — OXYCODONE HYDROCHLORIDE 5 MG/1
5 TABLET ORAL
Status: DISCONTINUED | OUTPATIENT
Start: 2024-06-06 | End: 2024-06-06 | Stop reason: HOSPADM

## 2024-06-06 RX ADMIN — SODIUM CHLORIDE, POTASSIUM CHLORIDE, SODIUM LACTATE AND CALCIUM CHLORIDE: 600; 310; 30; 20 INJECTION, SOLUTION INTRAVENOUS at 09:30

## 2024-06-06 RX ADMIN — CLINDAMYCIN PHOSPHATE 900 MG: 900 INJECTION, SOLUTION INTRAVENOUS at 09:13

## 2024-06-06 RX ADMIN — ONDANSETRON 4 MG: 2 INJECTION INTRAMUSCULAR; INTRAVENOUS at 10:21

## 2024-06-06 RX ADMIN — PROPOFOL 150 MG: 10 INJECTION, EMULSION INTRAVENOUS at 09:38

## 2024-06-06 RX ADMIN — Medication 50 MG: at 09:39

## 2024-06-06 RX ADMIN — PROPOFOL 50 MG: 10 INJECTION, EMULSION INTRAVENOUS at 10:34

## 2024-06-06 RX ADMIN — PHENYLEPHRINE HYDROCHLORIDE 50 MCG: 10 INJECTION INTRAVENOUS at 09:53

## 2024-06-06 RX ADMIN — SUGAMMADEX 200 MG: 100 INJECTION, SOLUTION INTRAVENOUS at 10:40

## 2024-06-06 RX ADMIN — PROPOFOL 50 MG: 10 INJECTION, EMULSION INTRAVENOUS at 10:25

## 2024-06-06 RX ADMIN — LIDOCAINE HYDROCHLORIDE 100 MG: 20 INJECTION, SOLUTION INFILTRATION; PERINEURAL at 09:38

## 2024-06-06 RX ADMIN — PHENYLEPHRINE HYDROCHLORIDE 50 MCG: 10 INJECTION INTRAVENOUS at 10:05

## 2024-06-06 RX ADMIN — FENTANYL CITRATE 50 MCG: 50 INJECTION INTRAMUSCULAR; INTRAVENOUS at 09:38

## 2024-06-06 RX ADMIN — PROPOFOL 150 MCG/KG/MIN: 10 INJECTION, EMULSION INTRAVENOUS at 09:39

## 2024-06-06 RX ADMIN — DEXAMETHASONE SODIUM PHOSPHATE 10 MG: 4 INJECTION, SOLUTION INTRA-ARTICULAR; INTRALESIONAL; INTRAMUSCULAR; INTRAVENOUS; SOFT TISSUE at 09:39

## 2024-06-06 ASSESSMENT — ACTIVITIES OF DAILY LIVING (ADL)
ADLS_ACUITY_SCORE: 35
ADLS_ACUITY_SCORE: 35
ADLS_ACUITY_SCORE: 33
ADLS_ACUITY_SCORE: 35
ADLS_ACUITY_SCORE: 35

## 2024-06-06 NOTE — ANESTHESIA POSTPROCEDURE EVALUATION
Patient: Susan Liu    Procedure: Procedure(s):  Microdirect laryngoscopy with incision, dilation, CO2 laser for airway stenosis  steroid injection       Anesthesia Type:  General    Note:  Disposition: Outpatient   Postop Pain Control: Uneventful            Sign Out: Well controlled pain   PONV: No   Neuro/Psych: Uneventful            Sign Out: Acceptable/Baseline neuro status   Airway/Respiratory: Uneventful            Sign Out: Acceptable/Baseline resp. status   CV/Hemodynamics: Uneventful            Sign Out: Acceptable CV status   Other NRE: NONE   DID A NON-ROUTINE EVENT OCCUR? No           Last vitals:  Vitals Value Taken Time   /79 06/06/24 1130   Temp 36.9  C (98.5  F) 06/06/24 1055   Pulse 74 06/06/24 1130   Resp 17 06/06/24 1130   SpO2 97 % 06/06/24 1130   Vitals shown include unfiled device data.    Electronically Signed By: Christopher J. Behrens, MD  June 6, 2024  11:31 AM

## 2024-06-06 NOTE — OR NURSING
Discharge instructions printed and reviewed with patient and daughter.  All questions answered at this time.  Discharge prescriptions NONE All belongings returned to patient at this time.

## 2024-06-06 NOTE — BRIEF OP NOTE
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 Easy mask. Easy laryngeal exposure with Ossoff laryngoscope.  Subglottic stenosis 15 mm long, beginning approximately 14 mm below true vocal folds; diameter ~6 mm.  Posterior glottic scar at and just below the level of the true vocal folds.

## 2024-06-06 NOTE — DISCHARGE INSTRUCTIONS
Contacting your Doctor -   To contact a doctor, call Dr Jordan at  the ENT- Otolaryngology Clinic at [phone] or:  [phone] and ask for the resident on call for ENT-Otolaryngology (answered 24 hours a day)   Emergency Department:  [facility]: [phone]  [facility]: [phone] if you are in need of immediate or emergent help

## 2024-06-06 NOTE — ANESTHESIA PREPROCEDURE EVALUATION
Anesthesia Pre-Procedure Evaluation    Patient: Susan Liu   MRN: [id number] : 1950        Procedure : Procedure(s):  Microdirect laryngoscopy with incision possible dilation, possible mitomycin, possible CO2 laser for airway stenosis  steroid injection          Past Medical History:   Diagnosis Date    Allergic rhinitis     Allergic rhinitis, cause unspecified     Anemia     Anxiety     Balance problem     Bleeding disorder (H)     Chronic sinusitis     Depression     Family history of thyroid problem     Hoarseness     Joint disease     Osteoarthritis     Osteoporosis     Reflux     Sinus problem     Skin disease     Thyroid disease     Tinnitus     Wesely syndrome       Past Surgical History:   Procedure Laterality Date    BUNIONECTOMY      both feet     SECTION      three    dilation for subglotic stenosis[      INJECT STEROID (LOCATION)  2013    Procedure: INJECT STEROID (LOCATION);;  Surgeon: Marcelina Jordan MD;  Location: [facility] OR    INJECT STEROID (LOCATION) N/A 2018    Procedure: INJECT STEROID (LOCATION);;  Surgeon: Marcelina Jordan MD;  Location: [facility] OR    LASER CO2 LARYNGOSCOPY, COMPLEX  2013    Procedure: LASER CO2 LARYNGOSCOPY, COMPLEX;  Co2 Lumenis Laser Micro Direct Laryngoscopy, Incision and Dilation Subglottic stenosis, Steroid Injection, Mitomycin application. ;  Surgeon: Marcelina Jordan MD;  Location: [facility] OR    LASER CO2 LARYNGOSCOPY, COMPLEX N/A 2018    Procedure: LASER CO2 LARYNGOSCOPY, COMPLEX;  Microdirect Laryngoscopy With Incision/Dilation Of Subglottic Stenosis, Steroid Injection,  Mitomycin Application With CO2 Laser ;  Surgeon: Marcelina Jordan MD;  Location: [facility] OR      Allergies   Allergen Reactions    Penicillins Anaphylaxis    Codeine Nausea and Vomiting    Codeine Camsylate Nausea and Vomiting    Tramadol GI Disturbance    Tramadol Hcl       Social History     Tobacco Use    Smoking status: Never    Smokeless  "tobacco: Never   Substance Use Topics    Alcohol use: Yes     Comment: 2 glasses wine a day      Wt Readings from Last 1 Encounters:   04/19/24 73.5 kg (162 lb)        Anesthesia Evaluation            ROS/MED HX  ENT/Pulmonary:       Neurologic:       Cardiovascular:    (-) murmur and wheezes   METS/Exercise Tolerance:     Hematologic:     (+)      anemia,          Musculoskeletal:   (+)  arthritis,             GI/Hepatic:     (+) GERD,                   Renal/Genitourinary:       Endo:     (+)          thyroid problem,            Psychiatric/Substance Use:     (+) psychiatric history anxiety       Infectious Disease:       Malignancy:       Other:            Physical Exam    Airway        Mallampati: II   TM distance: > 3 FB   Neck ROM: full   Mouth opening: > 3 cm    Respiratory Devices and Support         Dental  no notable dental history         Cardiovascular          Rhythm and rate: regular and normal (-) no systolic click and no murmur    Pulmonary   pulmonary exam normal        breath sounds clear to auscultation   (-) no wheezes        OUTSIDE LABS:  CBC: No results found for: \"WBC\", \"HGB\", \"HCT\", \"PLT\"  BMP: No results found for: \"NA\", \"POTASSIUM\", \"CHLORIDE\", \"CO2\", \"BUN\", \"CR\", \"GLC\"  COAGS: No results found for: \"PTT\", \"INR\", \"FIBR\"  POC:   Lab Results   Component Value Date    BG 94 02/01/2018     HEPATIC: No results found for: \"ALBUMIN\", \"PROTTOTAL\", \"ALT\", \"AST\", \"GGT\", \"ALKPHOS\", \"BILITOTAL\", \"BILIDIRECT\", \"VERONICA\"  OTHER: No results found for: \"PH\", \"LACT\", \"A1C\", \"LYSSA\", \"PHOS\", \"MAG\", \"LIPASE\", \"AMYLASE\", \"TSH\", \"T4\", \"T3\", \"CRP\", \"SED\"    Anesthesia Plan    ASA Status:  3    NPO Status:  NPO Appropriate    Anesthesia Type: General.     - Airway: Native airway   Induction: Intravenous.   Maintenance: Inhalation.        Consents    Anesthesia Plan(s) and associated risks, benefits, and realistic alternatives discussed. Questions answered and patient/representative(s) expressed understanding.     - " Discussed: Risks, Benefits and Alternatives for BOTH SEDATION and the PROCEDURE were discussed     - Discussed with:  Patient      - Extended Intubation/Ventilatory Support Discussed: Yes.      - Patient is DNR/DNI Status: No     Use of blood products discussed: Yes.     - Discussed with: Patient.     Postoperative Care    Pain management: IV analgesics.   PONV prophylaxis: Ondansetron (or other 5HT-3), Dexamethasone or Solumedrol     Comments:               Christopher J. Behrens, MD    I have reviewed the pertinent notes and labs in the chart from the past 30 days and (re)examined the patient.  Any updates or changes from those notes are reflected in this note.

## 2024-06-06 NOTE — OP NOTE
PROCEDURE(S):  Microdirect laryngoscopy  CO2 laser incision of subglottic stenosis under microscopic visualization  Steroid injection to subglottic stenosis under telescopic visualization  Balloon dilation of subglottic stenosis under telescopic visualization    PRE-OPERATIVE DIAGNOSIS:   Subglottic Stenosis    POST-OPERATIVE DIAGNOSIS:   As above    SURGEON: Marcelina Jordan MD MPH    ANAESTHESIA: General, with intermittent endolaryngeal/endotracheal intubation    INDICATIONS FOR PROCEDURE:   Susan Liu is a 73 year old year old female with a history of dyspnea and longstanding GPA who was noted to have recurrent glottic inflammation and subglottic stenosis.  Operative intervention was recommended. After the risks, benefits, and alternatives had been discussed, the patient wished to proceed and presented for the procedure(s) listed above.    DESCRIPTION OF PROCEDURE:   The patient was brought to the operating room and placed supine. A time-out was called to verify patient identity, operative site, and planned procedure. The operative site was prepped in the usual clean fashion. Moist gauze eye pads were placed and secured. A head wrap was placed, and custom molded thermoplastic tooth guards were placed. General anesthesia was induced with mask ventilation by Anesthesia. Direct laryngoscopy was then performed with an Ossoff-Pilling laryngoscope, which was placed in suspension. Ventilation was established via endotracheal tube placement through the laryngoscope and good chest rise was observed. The vocal folds were examined with 0 degree telescope with findings as below. The laryngoscope was slightly advanced to allow improved access to the area of stenosis. Tracheoscopy revealed no additional lesions or areas of narrowing. The mucopurulent secretions were collected for culture, and a small biopsy was taken from the subglottic stenosis.    The operating microscope was then brought into position. Laser safety  precautions were utilized at all times, including eye protection for the patient and staff, use of wet towels, and removing the endotracheal tube during laser use. A laser safety checklist was completed by all staff in the room. As needed, moistened cottonoids or laser-safe backstops were used. The Lumenis CO2 Accublade laser was attached to the microscope and used at a depth setting of 2 and 8 Bravo. Incisions were made in the most prominent areas of the stenosis, including the posterior larynx, as well as along the subglottic stenosis. The cuts were widened into wedge shapes. The endotracheal tube was replaced as needed to maintain good respiratory support. Cottonoids soaked in 1:10,000 epinephrine were sparingly used to maintain hemostasis. As appropriate, suspension was released to minimize injury to the tongue. The operating microscope was then moved out of position.    After the incisions were completed, cc of Kenalog-40 was injected into the stenosis under telescopic visualization.    A Cre pulmonary dilating balloon was then used to dilate the subglottic stenotic site under telescopic visualization. The balloon was inflated to approximately 4 amber, which corresponds to approximately 14 mm diameter, and held for a total of 2 minutes with brief breaks for ventilation and reperfusion.    As needed during the procedure and at the conclusion of the procedure, the operating microscope was moved out of position and the 0 degree rigid endoscope was again used to examine the airway and confirm completion of the stated objectives of the procedure. After cottonoid and sponge counts were confirmed correct, the airway was suctioned and 2 cc of 4% lidocaine were applied transglottically for laryngotracheal anesthesia. The laryngoscope and tooth guards were removed. The lips, mouth, and tongue were examined and no injuries were noted. The oropharynx was suctioned clean. Care of the patient was returned to  Anesthesia.      FINDINGS:   Edentulous. Easy mask. Easy laryngeal exposure with Ossoff laryngoscope.  Posterior glottic scar at and just below the level of the true vocal folds. Bilaterally full true vocal folds with infraglottic thickening.  Subglottic stenosis 15 mm long, beginning approximately 14 mm below true vocal folds; diameter ~6 mm.  No granulation. Heavy mucopurulence.      SPECIMEN(S):   Subglottic tissue for pathology  Subglottic secretions for microbiology.    DRAINS: None    ESTIMATED BLOOD LOSS: Minimal    COMPLICATIONS: None apparent    DISPOSITION: Stable to PACU    ATTENDING PRESENCE STATEMENT:  I was present and participating for the entire procedure from beginning to completion.

## 2024-06-06 NOTE — ANESTHESIA CARE TRANSFER NOTE
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    Anesthesia Type:   General     Note:    Oropharynx: oral airway in place and spontaneously breathing  Level of Consciousness: drowsy  Oxygen Supplementation: face mask  Level of Supplemental Oxygen (L/min / FiO2): 4  Independent Airway: airway patency satisfactory and stable  Dentition: dentition unchanged  Vital Signs Stable: post-procedure vital signs reviewed and stable  Report to RN Given: handoff report given  Patient transferred to: PACU    Handoff Report: Identifed the Patient, Identified the Reponsible Provider, Reviewed the pertinent medical history, Discussed the surgical course, Reviewed Intra-OP anesthesia mangement and issues during anesthesia, Set expectations for post-procedure period and Allowed opportunity for questions and acknowledgement of understanding  Vitals:  Vitals Value Taken Time   /84 06/06/24 1100   Temp 36.9  C (98.5  F) 06/06/24 1055   Pulse 77 06/06/24 1109   Resp 23 06/06/24 1109   SpO2 96 % 06/06/24 1109   Vitals shown include unfiled device data.    Electronically Signed By: YAJAIRA Hendrix CRNA  June 6, 2024  11:10 AM

## 2024-06-07 LAB
PATH REPORT.COMMENTS IMP SPEC: NORMAL
PATH REPORT.COMMENTS IMP SPEC: NORMAL
PATH REPORT.FINAL DX SPEC: NORMAL
PATH REPORT.GROSS SPEC: NORMAL
PATH REPORT.MICROSCOPIC SPEC OTHER STN: NORMAL
PATH REPORT.RELEVANT HX SPEC: NORMAL
PHOTO IMAGE: NORMAL

## 2024-06-08 LAB
BACTERIA FLD CULT: ABNORMAL
BACTERIA FLD CULT: ABNORMAL

## 2024-06-09 ENCOUNTER — HEALTH MAINTENANCE LETTER (OUTPATIENT)
Age: 74
End: 2024-06-09

## 2024-06-10 NOTE — TELEPHONE ENCOUNTER
Called patient attempted to LVM, bt vmb was full. Called patient daughter, and let her know that we already have a post-op scheduled. Patient daughter confirmed that would work and she would arrange someone to come with the patient as she works that day. Patient daughter will let patient know that appointment is scheduled and to reach out with any questions or concerns in the meantime. Virgie Ayala RN on 6/10/2024 at 10:20 AM

## 2024-06-11 ENCOUNTER — PATIENT OUTREACH (OUTPATIENT)
Dept: OTOLARYNGOLOGY | Facility: CLINIC | Age: 74
End: 2024-06-11
Payer: COMMERCIAL

## 2024-06-11 DIAGNOSIS — J04.10 TRACHEITIS: Primary | ICD-10-CM

## 2024-06-11 RX ORDER — CLINDAMYCIN HCL 300 MG
300 CAPSULE ORAL 3 TIMES DAILY
Qty: 21 CAPSULE | Refills: 0 | Status: SHIPPED | OUTPATIENT
Start: 2024-06-11 | End: 2024-06-18

## 2024-06-11 NOTE — PROGRESS NOTES
Called patient and daughter to go over lab results, from results provider recommended 7 day course of antibiotics. Writer went over symptoms associated with abx to monitor for. Patient was agreeable and verbalized understanding of the situation. Virgie Ayala RN on 6/11/2024 at 7:54 AM

## 2024-07-01 ENCOUNTER — OFFICE VISIT (OUTPATIENT)
Dept: OTOLARYNGOLOGY | Facility: CLINIC | Age: 74
End: 2024-07-01
Payer: COMMERCIAL

## 2024-07-01 VITALS
WEIGHT: 162.6 LBS | SYSTOLIC BLOOD PRESSURE: 169 MMHG | HEIGHT: 62 IN | BODY MASS INDEX: 29.92 KG/M2 | DIASTOLIC BLOOD PRESSURE: 109 MMHG | OXYGEN SATURATION: 95 % | HEART RATE: 88 BPM

## 2024-07-01 DIAGNOSIS — J38.6 SUBGLOTTIC STENOSIS: Primary | ICD-10-CM

## 2024-07-01 DIAGNOSIS — M31.30 GRANULOMATOSIS WITH POLYANGIITIS WITHOUT RENAL INVOLVEMENT (H): ICD-10-CM

## 2024-07-01 DIAGNOSIS — Q31.0 LARYNGEAL WEB: ICD-10-CM

## 2024-07-01 PROCEDURE — 31575 DIAGNOSTIC LARYNGOSCOPY: CPT | Performed by: OTOLARYNGOLOGY

## 2024-07-01 PROCEDURE — 99214 OFFICE O/P EST MOD 30 MIN: CPT | Mod: 25 | Performed by: OTOLARYNGOLOGY

## 2024-07-01 RX ORDER — CIPROFLOXACIN 500 MG/1
500 TABLET, FILM COATED ORAL 2 TIMES DAILY
Qty: 28 TABLET | Refills: 0 | Status: SHIPPED | OUTPATIENT
Start: 2024-07-01 | End: 2024-07-15

## 2024-07-01 RX ORDER — BUDESONIDE 0.25 MG/2ML
0.25 INHALANT ORAL DAILY
Qty: 60 ML | Refills: 6 | Status: SHIPPED | OUTPATIENT
Start: 2024-07-01

## 2024-07-01 ASSESSMENT — PAIN SCALES - GENERAL: PAINLEVEL: MODERATE PAIN (5)

## 2024-07-01 NOTE — PATIENT INSTRUCTIONS
1.  You were seen in the ENT Clinic today by . If you have any questions or concerns after your appointment, please call [phone]. Press option #1 for scheduling related needs. Press option #3 for Nurse advice.    2.   has recommended the following:   - check peak flow and send my chart with results   - resume pulmicort nebulizers   - start ciprofloxacin and take as directed. Prescription sent to your pharmacy   - continue follow ups with Charlie    3.  Plan is to return to clinic 2-3 months      Katie Mckeon LPN  [phone]  [facility] - Otolaryngology

## 2024-07-01 NOTE — LETTER
7/1/2024      RE: Susan Liu  [address] [place] [address]       Dear Colleague:  Susan Liu recently returned for follow-up at the [facility]. My clinic note from our visit is enclosed below.  Speech recognition software may have been used in the documentation below; input is reviewed before signature to the best of my ability.     I appreciate the ongoing opportunity to participate in this patient's care.    Please feel free to contact me with any questions.      Sincerely yours,  Marcelina Jordan M.D., M.P.H.  , Laryngology  Director, [facility]  Otolaryngology- Head & Neck Surgery  [phone]            =====  HISTORY OF PRESENT ILLNESS:  Susan Lui is a pleasant 73 year old female with a 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 She is s/p Microdirect laryngoscopy with incision, dilation, steroid injection of subglottic stenosis on 2/1/18, and previously in 2013.    She had been lost to follow up and returned in Nov 2023 with sticky mucopurulent secretions. Based on culture data showing 3+ Staph pseud/intermedius, 2+ P. aeruginosa, she was ultimately treated with ciprofloxacin and prednisone PO and ciprodex nebs. She initially had some improvement, but her symptoms worsened after she completed the antibiotics. Therefore we placed her on a longer course of antibiotics and steroids and advised her to seek timely care from her rheumatologist due to the ongoing flare. Dr Greene saw the patient 12/22/23 and recommended consideration of azathioprine; he noted that she previously did not tolerate methotrexate due to hepatic effects, and her insurance situation would likely lead to high costs with rituximab. He also recommended another prednisone burst, and completed a comprehensive screen/evaluation to assess for other contributing factors.      Ultimately she required surgery  again- microDL with incision, steroid injection, balloon dilation on 6/6/24.      Today's updates:  - breathing is good  - was able to go swimming in the pool on vacation  - Peak flow was better but she can't remember what it was  - voice and swallowing are doing fine  - stopped using inhaled steroids post-operatively  - started having a sore throat and increased secretions 2 days ago, ibuprofen has been helping      MEDICATIONS:     Current Outpatient Medications   Medication Sig Dispense Refill     ADVANCED EVENING PRIMROSE OIL OR Take  by mouth.       azaTHIOprine (IMURAN) 50 MG tablet Take 50 mg by mouth       budesonide (PULMICORT) 0.5 MG/2ML neb solution NEBULIZE 1 UNIT DOSE BY MOUTH DAILY       calcium carbonate-vitamin D 600-200 MG-UNIT TABS Take by mouth every morning        cetirizine (ZYRTEC) 10 MG tablet Take 10 mg by mouth At Bedtime        ciprofloxacin-dexAMETHasone (CIPRODEX) 0.3-0.1 % otic suspension Add 4 drops  in nebulizer. 7.5 mL 0     dextromethorphan-guaiFENesin (MUCINEX DM)  MG per 12 hr tablet Take 1 tablet by mouth as needed        EVENING PRIMROSE OIL PO Take by mouth daily (with lunch)       ipratropium - albuterol 0.5 mg/2.5 mg/3 mL (DUONEB) 0.5-2.5 (3) MG/3ML nebulization Take 1 ampule by nebulization every 6 hours as needed       levothyroxine (SYNTHROID, LEVOTHROID) 50 MCG tablet Take 50 mcg by mouth every morning        Magnesium 400 MG TABS Take 400 mg by mouth At Bedtime       Melatonin 10 MG TABS tablet Take 10 mg by mouth At Bedtime       Multiple Vitamin (DAILY MULTIVITAMIN PO) Take by mouth daily (with lunch)        omeprazole (PRILOSEC) 20 MG DR capsule TAKE 1 CAPSULE BY MOUTH DAILY 1 HOUR BEFORE A MEAL       omeprazole 20 MG tablet Take 40 mg by mouth daily (with lunch)        Lydia LC Sprint Nebulizer Set MISC See Admin Instructions       predniSONE (DELTASONE) 10 MG tablet        predniSONE (DELTASONE) 20 MG tablet Take 2.5 mg by mouth every morning        predniSONE  (DELTASONE) 5 MG tablet Take (12) 5mg tablets for two days, then take (11) 5 mg tablets for two days, then take (10) 5 mg tablets for two days, then take (9) 5 mg tablets for two days, then take (8) 5 mg tablets for two days, then take (7) 5 mg tablets for two days, then take (6) 5 mg tablets for two days, then take (5) 5 mg tablets for two days, then take (4) 5 mg for two days, then take (3) 5 mg tablets for two days, then take (2) 5 mg tablets for two days. Then resume typical prednisone dose. 114 tablet 1     sodium chloride 0.9 % neb solution Take 5 mLs by nebulization as needed for wheezing 500 mL 0     sulfamethoxazole-trimethoprim (BACTRIM) 400-80 MG tablet Take 1 tablet by mouth daily at 2 pm       sulfamethoxazole-trimethoprim (BACTRIM/SEPTRA) suspension Take 10 mg/kg/day by mouth 2 times daily Dose based on TMP component.       traZODone (DESYREL) 50 MG tablet        TRAZODONE HCL PO Take 50 mg by mouth At Bedtime          ALLERGIES:    Allergies   Allergen Reactions     Penicillins Anaphylaxis     Codeine Nausea and Vomiting     Codeine Camsylate Nausea and Vomiting     Tramadol GI Disturbance     Tramadol Hcl        NEW PMH/PSH: None    REVIEW OF SYSTEMS:  The patient completed a comprehensive 11 point review of systems (below), which was reviewed. Positives are as noted below.  Patient Supplied Answers to Review of Systems      7/1/2024    11:47 AM   [facility] ROS   Ears, Nose, Throat Sore throat   Hematologic Easy bruising       PHYSICAL EXAM:  General: The patient was alert and conversant, and in no acute distress. Patient accompanied by her granddaughter.  Oral cavity/oropharynx: No masses or lesions. Dentition unchanged since prior. Tongue mobility and palate elevation intact and symmetric.  Neck: No palpable cervical lymphadenopathy, no significant tenderness to palpation of the thyrohyoid space, which was narrow. No obvious thyroid abnormality.  Resp: Breathing comfortably, no stridor or stertor.  Mildly audible noise on rapid inhalation.  Neuro: Symmetric facial function. Other cranial nerve function as documented above.  Psych: Normal affect, pleasant and cooperative.  Voice/speech: No significant dysphonia.      Procedure:   Flexible fiberoptic laryngoscopy  Indications: This procedure was warranted to evaluate the patient's laryngeal anatomy and function. Risks, benefits, and alternatives were discussed.  Description: After written informed consent was obtained, a time-out was performed to confirm patient identity, procedure, and procedure site. Topical 2% lidocaine and oxymetazoline were applied to the nasal cavities and pharynx. I performed the endoscopy and no complications were apparent.  Performed by: Marcelina Jordan MD MPH  Findings: Normal nasopharynx. Normal base of tongue, valleculae, and epiglottis. The right true vocal fold demonstrated normal mobility. The left true vocal fold demonstrated normal mobility. The medial edges of the true vocal folds appeared smooth and straight. No focal mucosal lesions were observed on the true vocal folds. Stable infraglottic web. Glissade produced appropriate elongation. Mucosa of the false vocal folds, aryepiglottic folds, piriform sinuses, and posterior glottis unremarkable. Very sticky secretions at glottis and subglottis. Brief glimpse of subglottis showed a patent airway at the subglottis with mild to moderate stenosis. Very limited tolerance of the exam today despite topical anesthesia.                                IMPRESSION AND PLAN:   Susan Liu returns with a somewhat improved airway post-operatively; she does have heavy yellow sticky secretions again. Unfortunately she had stopped all of her nebs post-operatively.    Plan:  -check peak flow, send us a [employer] message. Also recommended checking peak flows at least once a month, ideally twice a month, to track airway status  -resume Pulmicort nebs; we discussed that the nebs are intended to  help promote ongoing airway patency and not as a prn medication  -ciprofloxacin x 14 days for recurrent sticky airway secretions  -continue close follow up with Dr Greene. Discussed that ongoing medical management is important for reducing the need for repeated operative interventions.  -RTC 2-3 months or sooner as needed    I spent a total of 31 minutes on 7/1/2024 in chart review, review of tests, patient visit, documentation, care coordination, and/or discussion with other providers about the issues documented above, separate from any documented procedure(s).      Marcelina Jordan MD

## 2024-07-01 NOTE — PROGRESS NOTES
Dear Colleague:  Susan Liu recently returned for follow-up at the [facility]. My clinic note from our visit is enclosed below.  Speech recognition software may have been used in the documentation below; input is reviewed before signature to the best of my ability.     I appreciate the ongoing opportunity to participate in this patient's care.    Please feel free to contact me with any questions.      Sincerely yours,  Marcelina Jordan M.D., M.P.H.  , Laryngology  Director, 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 She is s/p Microdirect laryngoscopy with incision, dilation, steroid injection of subglottic stenosis on 2/1/18, and previously in 2013.    She had been lost to follow up and returned in Nov 2023 with sticky mucopurulent secretions. Based on culture data showing 3+ Staph pseud/intermedius, 2+ P. aeruginosa, she was ultimately treated with ciprofloxacin and prednisone PO and ciprodex nebs. She initially had some improvement, but her symptoms worsened after she completed the antibiotics. Therefore we placed her on a longer course of antibiotics and steroids and advised her to seek timely care from her rheumatologist due to the ongoing flare. Dr Greene saw the patient 12/22/23 and recommended consideration of azathioprine; he noted that she previously did not tolerate methotrexate due to hepatic effects, and her insurance situation would likely lead to high costs with rituximab. He also recommended another prednisone burst, and completed a comprehensive screen/evaluation to assess for other contributing factors.      Ultimately she required surgery again- microDL with incision, steroid injection, balloon dilation on  6/6/24.      Today's updates:  - breathing is good  - was able to go swimming in the pool on vacation  - Peak flow was better but she can't remember what it was  - voice and swallowing are doing fine  - stopped using inhaled steroids post-operatively  - started having a sore throat and increased secretions 2 days ago, ibuprofen has been helping      MEDICATIONS:     Current Outpatient Medications   Medication Sig Dispense Refill    ADVANCED EVENING PRIMROSE OIL OR Take  by mouth.      azaTHIOprine (IMURAN) 50 MG tablet Take 50 mg by mouth      budesonide (PULMICORT) 0.5 MG/2ML neb solution NEBULIZE 1 UNIT DOSE BY MOUTH DAILY      calcium carbonate-vitamin D 600-200 MG-UNIT TABS Take by mouth every morning       cetirizine (ZYRTEC) 10 MG tablet Take 10 mg by mouth At Bedtime       ciprofloxacin-dexAMETHasone (CIPRODEX) 0.3-0.1 % otic suspension Add 4 drops  in nebulizer. 7.5 mL 0    dextromethorphan-guaiFENesin (MUCINEX DM)  MG per 12 hr tablet Take 1 tablet by mouth as needed       EVENING PRIMROSE OIL PO Take by mouth daily (with lunch)      ipratropium - albuterol 0.5 mg/2.5 mg/3 mL (DUONEB) 0.5-2.5 (3) MG/3ML nebulization Take 1 ampule by nebulization every 6 hours as needed      levothyroxine (SYNTHROID, LEVOTHROID) 50 MCG tablet Take 50 mcg by mouth every morning       Magnesium 400 MG TABS Take 400 mg by mouth At Bedtime      Melatonin 10 MG TABS tablet Take 10 mg by mouth At Bedtime      Multiple Vitamin (DAILY MULTIVITAMIN PO) Take by mouth daily (with lunch)       omeprazole (PRILOSEC) 20 MG DR capsule TAKE 1 CAPSULE BY MOUTH DAILY 1 HOUR BEFORE A MEAL      omeprazole 20 MG tablet Take 40 mg by mouth daily (with lunch)       Lydia LC Sprint Nebulizer Set MISC See Admin Instructions      predniSONE (DELTASONE) 10 MG tablet       predniSONE (DELTASONE) 20 MG tablet Take 2.5 mg by mouth every morning       predniSONE (DELTASONE) 5 MG tablet Take (12) 5mg tablets for two days, then take (11) 5 mg tablets  for two days, then take (10) 5 mg tablets for two days, then take (9) 5 mg tablets for two days, then take (8) 5 mg tablets for two days, then take (7) 5 mg tablets for two days, then take (6) 5 mg tablets for two days, then take (5) 5 mg tablets for two days, then take (4) 5 mg for two days, then take (3) 5 mg tablets for two days, then take (2) 5 mg tablets for two days. Then resume typical prednisone dose. 114 tablet 1    sodium chloride 0.9 % neb solution Take 5 mLs by nebulization as needed for wheezing 500 mL 0    sulfamethoxazole-trimethoprim (BACTRIM) 400-80 MG tablet Take 1 tablet by mouth daily at 2 pm      sulfamethoxazole-trimethoprim (BACTRIM/SEPTRA) suspension Take 10 mg/kg/day by mouth 2 times daily Dose based on TMP component.      traZODone (DESYREL) 50 MG tablet       TRAZODONE HCL PO Take 50 mg by mouth At Bedtime          ALLERGIES:    Allergies   Allergen Reactions    Penicillins Anaphylaxis    Codeine Nausea and Vomiting    Codeine Camsylate Nausea and Vomiting    Tramadol GI Disturbance    Tramadol Hcl        NEW PMH/PSH: None    REVIEW OF SYSTEMS:  The patient completed a comprehensive 11 point review of systems (below), which was reviewed. Positives are as noted below.  Patient Supplied Answers to Review of Systems      7/1/2024    11:47 AM   [facility] ROS   Ears, Nose, Throat Sore throat   Hematologic Easy bruising       PHYSICAL EXAM:  General: The patient was alert and conversant, and in no acute distress. Patient accompanied by her granddaughter.  Oral cavity/oropharynx: No masses or lesions. Dentition unchanged since prior. Tongue mobility and palate elevation intact and symmetric.  Neck: No palpable cervical lymphadenopathy, no significant tenderness to palpation of the thyrohyoid space, which was narrow. No obvious thyroid abnormality.  Resp: Breathing comfortably, no stridor or stertor. Mildly audible noise on rapid inhalation.  Neuro: Symmetric facial function. Other cranial nerve  function as documented above.  Psych: Normal affect, pleasant and cooperative.  Voice/speech: No significant dysphonia.      Procedure:   Flexible fiberoptic laryngoscopy  Indications: This procedure was warranted to evaluate the patient's laryngeal anatomy and function. Risks, benefits, and alternatives were discussed.  Description: After written informed consent was obtained, a time-out was performed to confirm patient identity, procedure, and procedure site. Topical 2% lidocaine and oxymetazoline were applied to the nasal cavities and pharynx. I performed the endoscopy and no complications were apparent.  Performed by: Marcelina Jordan MD MPH  Findings: Normal nasopharynx. Normal base of tongue, valleculae, and epiglottis. The right true vocal fold demonstrated normal mobility. The left true vocal fold demonstrated normal mobility. The medial edges of the true vocal folds appeared smooth and straight. No focal mucosal lesions were observed on the true vocal folds. Stable infraglottic web. Glissade produced appropriate elongation. Mucosa of the false vocal folds, aryepiglottic folds, piriform sinuses, and posterior glottis unremarkable. Very sticky secretions at glottis and subglottis. Brief glimpse of subglottis showed a patent airway at the subglottis with mild to moderate stenosis. Very limited tolerance of the exam today despite topical anesthesia.                                IMPRESSION AND PLAN:   Susan Liu returns with a somewhat improved airway post-operatively; she does have heavy yellow sticky secretions again. Unfortunately she had stopped all of her nebs post-operatively.    Plan:  -check peak flow, send us a [employer] message. Also recommended checking peak flows at least once a month, ideally twice a month, to track airway status  -resume Pulmicort nebs; we discussed that the nebs are intended to help promote ongoing airway patency and not as a prn medication  -ciprofloxacin x 14 days for  recurrent sticky airway secretions  -continue close follow up with Dr Greene. Discussed that ongoing medical management is important for reducing the need for repeated operative interventions.  -RTC 2-3 months or sooner as needed    I spent a total of 31 minutes on 7/1/2024 in chart review, review of tests, patient visit, documentation, care coordination, and/or discussion with other providers about the issues documented above, separate from any documented procedure(s).

## 2024-07-11 ENCOUNTER — PATIENT OUTREACH (OUTPATIENT)
Dept: OTOLARYNGOLOGY | Facility: CLINIC | Age: 74
End: 2024-07-11
Payer: COMMERCIAL

## 2024-07-11 NOTE — PROGRESS NOTES
Called patient to let her know that fungus grew back on the culture we took during surgery. Let patient know we planned to run tests to see what the fungus would be susceptible for treatment. Patient was agreeable to this. Patient noted her breathing was doing well, writer encouraged patient to reach out if anything changed and we would be in touch once susceptibilities are back. Patient was agreeable and verbalized understanding of the situation. Virgie Ayala RN on 7/11/2024 at 3:08 PM

## 2024-07-17 LAB — BACTERIA FLD CULT: ABNORMAL

## 2024-07-25 ENCOUNTER — TELEPHONE (OUTPATIENT)
Dept: INFECTIOUS DISEASES | Facility: CLINIC | Age: 74
End: 2024-07-25
Payer: COMMERCIAL

## 2024-07-25 DIAGNOSIS — B49 FUNGAL INFECTION: Primary | ICD-10-CM

## 2024-07-25 NOTE — TELEPHONE ENCOUNTER
EP called 7/25 but pt said she and daughter would call back later to sched. Please sched a New ID visit; 60 minutes with any ID provider. Referral from Dr. Marcelina Jordan MD.       ----- Message from EVY FLOYD sent at 7/25/2024  7:48 AM CDT -----  Regarding: RE: antifungal  Could you please place an infectious disease referral and then we can try to get her in as soon as we can?     Evy  ----- Message -----  From: Marcelina Jordan MD  Sent: 7/25/2024   7:37 AM CDT  To: Evy Floyd [id number]  Subject: RE: antifungal                                   Understand, thank you!  FYI to the coordinator team that she does not always answer her phone, but her daughter can help if it's hard to reach her.  Thank you,  Marcelina Jordan  ----- Message -----  From: Evy Floyd [facility]  Sent: 7/24/2024   4:51 PM CDT  To: Marcelina Jordan MD; #  Subject: RE: antifungal                                   Hi Dr. Jordan-     Thanks for the message and for reaching out! I think this would be best if the patient has an infectious disease consult to decide if it needs to be treated or not. I will attach our clinic team to have see if we have any openings coming up.    Coordinators- do we have any openings soon for this patient?    Evy Floyd PharmD, AAHIVP  Medication Therapy Management Pharmacist  ----- Message -----  From: Marcelina Jordan MD  Sent: 7/24/2024  11:00 AM CDT  To: Evy Floyd RPH; #  Subject: antifungal                                       Hi,  I am an ENT physician and was given your name after a 25 minute series of holds and transfers starting from my calling the outpatient pharmacy to ask for advice on a medication choice. I am under the impression that you are a pharmacist that works with the ID clinic and could advise for this patient who gets care at the [facility] - I hope that is correct!    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 I had treated her previously with ciprofloxacin but she tends to have heavy sticky secretions and ongoing airway inflammation despite medical management of her GPA, so I am thinking maybe I should treat the Cladosporidium. The susceptibility panel includes various antifungals, and I am wondering if oral fluconazole would be similar enough to any of them for that to be a reasonable choice for treating her? Or if there is a different alternative I should be considering?    If it is easier to discuss in real time, please feel free to page me, [phone].     Thank you very much for your thoughts and help!  Sincerely,  Marcelina Jordan MD

## 2024-07-31 NOTE — CONFIDENTIAL NOTE
DIAGNOSIS:  Fungal infection    DATE RECEIVED:  08.21.2024    NOTES (Gather within 2 years) STATUS DETAILS   OFFICE NOTE from referring provider   Internal 07.25.2024 Marcelina Jordan MD    LABS (any labs) Internal / CE    MEDICATION LIST Internal

## 2024-08-21 ENCOUNTER — PRE VISIT (OUTPATIENT)
Dept: INFECTIOUS DISEASES | Facility: CLINIC | Age: 74
End: 2024-08-21
Payer: COMMERCIAL

## 2024-08-21 ENCOUNTER — OFFICE VISIT (OUTPATIENT)
Dept: INFECTIOUS DISEASES | Facility: CLINIC | Age: 74
End: 2024-08-21
Attending: STUDENT IN AN ORGANIZED HEALTH CARE EDUCATION/TRAINING PROGRAM
Payer: COMMERCIAL

## 2024-08-21 VITALS
BODY MASS INDEX: 29.08 KG/M2 | WEIGHT: 158 LBS | HEIGHT: 62 IN | SYSTOLIC BLOOD PRESSURE: 148 MMHG | DIASTOLIC BLOOD PRESSURE: 82 MMHG | HEART RATE: 77 BPM | TEMPERATURE: 98.2 F

## 2024-08-21 DIAGNOSIS — J04.10 TRACHEITIS: ICD-10-CM

## 2024-08-21 DIAGNOSIS — B49 FUNGAL INFECTION: ICD-10-CM

## 2024-08-21 DIAGNOSIS — J01.11 ACUTE RECURRENT FRONTAL SINUSITIS: Primary | ICD-10-CM

## 2024-08-21 PROCEDURE — 99204 OFFICE O/P NEW MOD 45 MIN: CPT | Performed by: STUDENT IN AN ORGANIZED HEALTH CARE EDUCATION/TRAINING PROGRAM

## 2024-08-21 PROCEDURE — G0463 HOSPITAL OUTPT CLINIC VISIT: HCPCS | Performed by: STUDENT IN AN ORGANIZED HEALTH CARE EDUCATION/TRAINING PROGRAM

## 2024-08-21 RX ORDER — LEVOFLOXACIN 750 MG/1
750 TABLET, FILM COATED ORAL DAILY
Qty: 5 TABLET | Refills: 0 | Status: SHIPPED | OUTPATIENT
Start: 2024-08-21 | End: 2024-08-26

## 2024-08-21 RX ORDER — DOXYCYCLINE 100 MG/1
100 CAPSULE ORAL DAILY
COMMUNITY
Start: 2024-08-13

## 2024-08-21 ASSESSMENT — PAIN SCALES - GENERAL: PAINLEVEL: MODERATE PAIN (4)

## 2024-08-21 NOTE — NURSING NOTE
"Chief Complaint   Patient presents with    New Patient     Fungal infection     BP (!) 148/82   Pulse 77   Temp 98.2  F (36.8  C) (Oral)   Ht 1.575 m (5' 2\")   Wt 71.7 kg (158 lb)   BMI 28.90 kg/m    Kina Bah MA on 8/21/2024 at 2:48 PM'    "

## 2024-08-21 NOTE — LETTER
8/21/2024       RE: Susan Liu  [address] [place] [address]     Dear Colleague,    Thank you for referring your patient, Susan Liu, to the [facility] [place] at [facility]. Please see a copy of my visit note below.    [facility]  Infectious Disease Clinic Note:  New Patient     Patient:  Susan Liu, Date of birth 1950, Medical record number [id number]  Date of Visit:  08/21/2024  Consult requested by Dr. Marcelina Jordan for evaluation of Cladosporium in subglottic cultures.         Assessment and Recommendations:       Assessment:    Reviewed Laryngoscopy images, s/o purulence and inflammation. Pt is currently on azathioprine and low dose prednisone. Reviewed past antimicrobials  - looks like Bactrim DS BID 11/15-11/25/2023, Cipro 500 BID 11/16-12/25/2023, and Cipro 500 mg BID 7/1/-15/2-24. She is currently on Doxy from another provider (unclear course reports will finish up in a day or two) for a skin and soft tissue infection related to dog bite on left forearm(infection appears resolved).     On today's eval, pt reports sinus pressure and post nasal drip of greenish secretions, which makes me think her laryngeal purulence likely has an element of post nasal drip, though with GPA both sinusitis and laryngeal disease are expected. Not much throat symptoms or systemic symptoms today. Given past h/o Pseudomonas and Staph intermedius and reported secretion characteristics will treat for sinusitis w Levofloxacin for 5 days(both S). In terms of Cladosporium in cultures from 6/6: It is a very common environmental bug that very rarely causes disease. Treatment in case reports has been empirical given no CLSI breakpoints. Voriconazole has been reportedly used for Cladosporium pneumonia in some reports for 3 months. Susan does not have any chest symptoms suggestive of a pneumonia today. In  Susan's case Vori SANJAY is 4, which is a little higher than I would like. Posa and Itra have MICs at 0.5. These higher azoles all come with a lot of side fefects such as liver injury, photosensitivity, visual hallucinations electrolyte abnormalities, drug interactions, arrhythmia etc so I do not feel compelled to treat at this time unless we definitvely prove Cladosporium is at play in her laryngoscopy findings. Will attempt to re-culture this first. Pt does not feel like she can cough up anything for a sample today so we will send her with a sputum cup. Fluconazole is not expected to be active due to poor anti-mold activity.    Recommendations:  - Treat sinusitis with Levofloxacin 750 mg daily x 5 days  - Advised to let me know and stop treatment if tendon pain  - Advised to take Levofloxacin away from steroids, hold multivitamins while on above pill  - Sputum cultures- aerobic, and fungal cultures, provided labeled sputum cup, advised to [facility] off at any nearby [facility] same day   - Do not feel compelled to treat fungus at this time, unless it keeps growing again as it is likely a colonizer  - Follow up with ENT as planned  - Follow up with me as needed    I have reviewed vitals, labs, images, cultures and antibiotics    Otilio Otto  Staff Physician  Division of Infectious Diseases           History of the Infectious Disease lllness:     Susan is a 74 y/o F pt referred by ENT with the question of Cladosporium treatment    Per latest ENT notes 7/1  Susan Liu is a pleasant 73 year old female with a 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 She is s/p Microdirect laryngoscopy with incision, dilation, steroid injection of subglottic stenosis on 2/1/18, and previously in 2013.     She had been lost to follow up and returned in Nov 2023 with sticky mucopurulent secretions. Based on culture data showing 3+ Staph pseud/intermedius, 2+ P. aeruginosa,  "she was ultimately treated with ciprofloxacin and prednisone PO and ciprodex nebs. She initially had some improvement, but her symptoms worsened after she completed the antibiotics. Therefore we placed her on a longer course of antibiotics and steroids and advised her to seek timely care from her rheumatologist due to the ongoing flare. Dr Greene saw the patient 12/22/23 and recommended consideration of azathioprine; he noted that she previously did not tolerate methotrexate due to hepatic effects, and her insurance situation would likely lead to high costs with rituximab. He also recommended another prednisone burst, and completed a comprehensive screen/evaluation to assess for other contributing factors.       Ultimately she required surgery again- microDL with incision, steroid injection, balloon dilation on 6/6/24.    These cultures grew Cladosdoprium and Staph pseudointermedius    Per ENT message to ID:  \"I am seeking guidance on what antifungal to use for this patient with GPA and subglottic stenosis who grew Cladosporidium on her cultures from the OR. I had treated her previously with ciprofloxacin but she tends to have heavy sticky secretions and ongoing airway inflammation despite medical management of her GPA, so I am thinking maybe I should treat the Cladosporidium. The susceptibility panel includes various antifungals, and I am wondering if oral fluconazole would be similar enough to any of them for that to be a reasonable choice for treating her? Or if there is a different alternative I should be considering?\"    The above cultures were from 2 months ago now, and she seems ti be doing ok in terms of throat symptoms today.. Pt mostly concerned about sinusitis symptoms today. Has b/l frontal sinus pressure and post nasal drip of greenish secretions. She does not have cough, SOB, chest pain, or flu like symptoms. Does not feel like she can cough up anything for a sample today. No fevers, occasional chills, " occasional sweats. Pt is currently on azathioprine and low dose prednisone. She is currently on Doxy from another provider (unclear course) for a skin and soft tissue infection related yue dog bite on left forearm. Has 2 pugs, they are older. Lives w daughter.     Past Medical History:   Diagnosis Date     Allergic rhinitis      Allergic rhinitis, cause unspecified      Anemia      Anxiety      Balance problem      Bleeding disorder (H24)      Chronic sinusitis      Depression      Family history of thyroid problem      Hoarseness      Joint disease      Osteoarthritis      Osteoporosis      Reflux      Sinus problem      Skin disease      Thyroid disease      Tinnitus      Wesley syndrome        Past Surgical History:   Procedure Laterality Date     BUNIONECTOMY      both feet      SECTION      three     dilation for subglotic stenosis[       INJECT STEROID (LOCATION)  2013    Procedure: INJECT STEROID (LOCATION);;  Surgeon: Marcelina Jordan MD;  Location: [facility] OR     INJECT STEROID (LOCATION) N/A 2018    Procedure: INJECT STEROID (LOCATION);;  Surgeon: Marcelina Jordan MD;  Location: [facility] OR     INJECT STEROID (LOCATION) N/A 2024    Procedure: steroid injection;  Surgeon: Marcelina Jordan MD;  Location: [facility] OR     LASER CO2 LARYNGOSCOPY N/A 2024    Procedure: Microdirect laryngoscopy with incision, dilation, CO2 laser for airway stenosis;  Surgeon: Marcelina Jordan MD;  Location: [facility] OR     LASER CO2 LARYNGOSCOPY, COMPLEX  2013    Procedure: LASER CO2 LARYNGOSCOPY, COMPLEX;  Co2 Lumenis Laser Micro Direct Laryngoscopy, Incision and Dilation Subglottic stenosis, Steroid Injection, Mitomycin application. ;  Surgeon: Marcelina Jordan MD;  Location: [facility] OR     LASER CO2 LARYNGOSCOPY, COMPLEX N/A 2018    Procedure: LASER CO2 LARYNGOSCOPY, COMPLEX;  Microdirect Laryngoscopy With Incision/Dilation Of Subglottic Stenosis, Steroid Injection,   Mitomycin Application With CO2 Laser ;  Surgeon: Marcelina Jordan MD;  Location: [facility] OR     Microdirect laryngoscopy with incision, dilation, CO2 laser for airway stenosis  06/06/2024       Family History   Problem Relation Age of Onset     Hypertension Mother      Dementia Mother      Depression Mother      Thyroid Disease Mother      Migraines Mother      Hypertension Father      Alcohol/Drug Father      Depression Father      Heart Disease Brother      Cancer Brother      Thyroid Disease Daughter      Migraines Daughter         2 daughters       Social History     Social History Narrative     Not on file     Social History     Tobacco Use     Smoking status: Never     Smokeless tobacco: Never   Substance Use Topics     Alcohol use: Yes     Comment: 2 glasses wine a day     Drug use: No       Immunization History   Administered Date(s) Administered     COVID-19 12+ (2023-24) (MODERNA) 10/09/2023     COVID-19 Bivalent 12+ (Pfizer) 04/26/2023     COVID-19 Monovalent 18+ (Moderna) 03/03/2021, 04/01/2021, 11/13/2021, 08/14/2022       Patient Active Problem List   Diagnosis     Subglottic stenosis     Encounter for long-term (current) use of medications     Esophageal reflux     Hypothyroidism     Osteoporosis     Primary osteoarthritis of both knees     Calcium pyrophosphate deposition disease     Trochanteric bursitis     Wegener's granulomatosis       Current Outpatient Medications   Medication Sig Dispense Refill     ADVANCED EVENING PRIMROSE OIL OR Take  by mouth.       azaTHIOprine (IMURAN) 50 MG tablet Take 50 mg by mouth       budesonide (PULMICORT) 0.25 MG/2ML neb solution Take 2 mLs (0.25 mg) by nebulization daily Use one unit dose via nebulizer daily 60 mL 6     budesonide (PULMICORT) 0.5 MG/2ML neb solution NEBULIZE 1 UNIT DOSE BY MOUTH DAILY       calcium carbonate-vitamin D 600-200 MG-UNIT TABS Take by mouth every morning        cetirizine (ZYRTEC) 10 MG tablet Take 10 mg by mouth At Bedtime         ciprofloxacin-dexAMETHasone (CIPRODEX) 0.3-0.1 % otic suspension Add 4 drops  in nebulizer. 7.5 mL 0     dextromethorphan-guaiFENesin (MUCINEX DM)  MG per 12 hr tablet Take 1 tablet by mouth as needed        doxycycline hyclate (VIBRAMYCIN) 100 MG capsule Take 100 mg by mouth daily.       EVENING PRIMROSE OIL PO Take by mouth daily (with lunch)       ipratropium - albuterol 0.5 mg/2.5 mg/3 mL (DUONEB) 0.5-2.5 (3) MG/3ML nebulization Take 1 ampule by nebulization every 6 hours as needed       levofloxacin (LEVAQUIN) 750 MG tablet Take 1 tablet (750 mg) by mouth daily for 5 days. 5 tablet 0     levothyroxine (SYNTHROID, LEVOTHROID) 50 MCG tablet Take 50 mcg by mouth every morning        Magnesium 400 MG TABS Take 400 mg by mouth At Bedtime       Melatonin 10 MG TABS tablet Take 10 mg by mouth At Bedtime       Multiple Vitamin (DAILY MULTIVITAMIN PO) Take by mouth daily (with lunch)        omeprazole (PRILOSEC) 20 MG DR capsule TAKE 1 CAPSULE BY MOUTH DAILY 1 HOUR BEFORE A MEAL       omeprazole 20 MG tablet Take 40 mg by mouth daily (with lunch)        Lydia LC Sprint Nebulizer Set MISC See Admin Instructions       predniSONE (DELTASONE) 10 MG tablet        predniSONE (DELTASONE) 20 MG tablet Take 2.5 mg by mouth every morning        predniSONE (DELTASONE) 5 MG tablet Take (12) 5mg tablets for two days, then take (11) 5 mg tablets for two days, then take (10) 5 mg tablets for two days, then take (9) 5 mg tablets for two days, then take (8) 5 mg tablets for two days, then take (7) 5 mg tablets for two days, then take (6) 5 mg tablets for two days, then take (5) 5 mg tablets for two days, then take (4) 5 mg for two days, then take (3) 5 mg tablets for two days, then take (2) 5 mg tablets for two days. Then resume typical prednisone dose. 114 tablet 1     sodium chloride 0.9 % neb solution Take 5 mLs by nebulization as needed for wheezing 500 mL 0     sulfamethoxazole-trimethoprim (BACTRIM) 400-80 MG tablet Take  "1 tablet by mouth daily at 2 pm       sulfamethoxazole-trimethoprim (BACTRIM/SEPTRA) suspension Take 10 mg/kg/day by mouth 2 times daily Dose based on TMP component.       traZODone (DESYREL) 50 MG tablet        TRAZODONE HCL PO Take 50 mg by mouth At Bedtime        No current facility-administered medications for this visit.       Allergies   Allergen Reactions     Penicillins Anaphylaxis     Codeine Nausea and Vomiting     Codeine Camsylate Nausea and Vomiting     Tramadol GI Disturbance     Tramadol Hcl               Physical Exam:   Vitals were reviewed.  All vitals stable  BP (!) 148/82   Pulse 77   Temp 98.2  F (36.8  C) (Oral)   Ht 1.575 m (5' 2\")   Wt 71.7 kg (158 lb)   BMI 28.90 kg/m      Exam:  GENERAL:  well-developed, well-nourished, alert, oriented, in no acute distress.  HEENT:  Head is normocephalic, atraumatic   EYES:  Eyes have anicteric sclerae.    LUNGS:  Breathing comfortably  SKIN:  Bruises on forearm. Left forearm with puncture marks from recent dog bite near dorsal wrist  NEUROLOGIC:  Grossly nonfocal.         Laboratory Data:     Microbiology:  Last 6 Culture results with specimen source  Culture Micro   Date Value Ref Range Status   02/01/2018 (A)  Final    Moderate growth  Staphylococcus pseudintermedius  Identification obtained by MALDI-TOF mass spectrometry research use only database. Test   characteristics determined and verified by the Infectious Diseases Diagnostic Laboratory   ([facility]) [place], [place].     02/01/2018 Culture negative after 4 weeks  Final    Specimen Description   Date Value Ref Range Status   02/01/2018 Fluid SUBGLOTTIC SECRETIONS  Final   02/01/2018 Fluid SUBGLOTTIC SECRETIONS  Final          Imaging:  Results for orders placed or performed during the hospital encounter of 02/01/18   XR Chest Port 1 View    Narrative    EXAM: XR CHEST PORT 1 VW  2/1/2018 1:11 PM      HISTORY: s/p subglottic stenosis dilation;     COMPARISON: 9/12/2013    FINDINGS: Single AP view " of the chest. Approximately 4 cm long  narrowing of the upper trachea. No pneumomediastinum/pneumothorax. Low  lung volumes. Unremarkable upper abdomen and visualized bones. Streaky  left basilar opacities, likely atelectatic. Increased vascular  markings.       Impression    IMPRESSION:   1. Left basilar atelectasis.  2. Mild upper tracheal stenosis.  3. Increased edema.         I have personally reviewed the examination and initial interpretation  and I agree with the findings.    HANNAH MATTHEW MD           Again, thank you for allowing me to participate in the care of your patient.      Sincerely,    Otilio Otot MD

## 2024-08-21 NOTE — PATIENT INSTRUCTIONS
- Treat sinusitis with Levofloxacin 750 mg daily x 5 days  - Let me know and stop treatment if tendon pain  - Take away from steroids, hold multivitamins while on above pill  - Sputum cultures, drop off at any nearby [facility] same day to see if fungus grows again  - Do not feel compelled to treat fungus at this time, unless it keeps growing again as it could be a colonizer  - Follow up with ENT as planned  - Follow up with me as needed

## 2024-08-21 NOTE — PROGRESS NOTES
[facility]  Infectious Disease Clinic Note:  New Patient     Patient:  Susan Liu, Date of birth 1950, Medical record number [id number]  Date of Visit:  08/21/2024  Consult requested by Dr. Marcelina Jordan for evaluation of Cladosporium in subglottic cultures.         Assessment and Recommendations:       Assessment:    Reviewed Laryngoscopy images, s/o purulence and inflammation. Pt is currently on azathioprine and low dose prednisone. Reviewed past antimicrobials  - looks like Bactrim DS BID 11/15-11/25/2023, Cipro 500 BID 11/16-12/25/2023, and Cipro 500 mg BID 7/1/-15/2-24. She is currently on Doxy from another provider (unclear course reports will finish up in a day or two) for a skin and soft tissue infection related to dog bite on left forearm(infection appears resolved).     On today's eval, pt reports sinus pressure and post nasal drip of greenish secretions, which makes me think her laryngeal purulence likely has an element of post nasal drip, though with GPA both sinusitis and laryngeal disease are expected. Not much throat symptoms or systemic symptoms today. Given past h/o Pseudomonas and Staph intermedius and reported secretion characteristics will treat for sinusitis w Levofloxacin for 5 days(both S). In terms of Cladosporium in cultures from 6/6: It is a very common environmental bug that very rarely causes disease. Treatment in case reports has been empirical given no CLSI breakpoints. Voriconazole has been reportedly used for Cladosporium pneumonia in some reports for 3 months. Susan does not have any chest symptoms suggestive of a pneumonia today. In Susan's case Vori SANJAY is 4, which is a little higher than I would like. Posa and Itra have MICs at 0.5. These higher azoles all come with a lot of side fefects such as liver injury, photosensitivity, visual hallucinations electrolyte abnormalities, drug interactions, arrhythmia etc so I do not feel compelled to  treat at this time unless we definitvely prove Cladosporium is at play in her laryngoscopy findings. Will attempt to re-culture this first. Pt does not feel like she can cough up anything for a sample today so we will send her with a sputum cup. Fluconazole is not expected to be active due to poor anti-mold activity.    Recommendations:  - Treat sinusitis with Levofloxacin 750 mg daily x 5 days  - Advised to let me know and stop treatment if tendon pain  - Advised to take Levofloxacin away from steroids, hold multivitamins while on above pill  - Sputum cultures- aerobic, and fungal cultures, provided labeled sputum cup, advised to drop off at any nearby [facility] same day   - Do not feel compelled to treat fungus at this time, unless it keeps growing again as it is likely a colonizer  - Follow up with ENT as planned  - Follow up with me as needed    I have reviewed vitals, labs, images, cultures and antibiotics    Otilio Otto  Staff Physician  Division of Infectious Diseases           History of the Infectious Disease lllness:     Susan is a 74 y/o F pt referred by ENT with the question of Cladosporium treatment    Per latest ENT notes 7/1  Susan Liu is a pleasant 73 year old female with a 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 She is s/p Microdirect laryngoscopy with incision, dilation, steroid injection of subglottic stenosis on 2/1/18, and previously in 2013.     She had been lost to follow up and returned in Nov 2023 with sticky mucopurulent secretions. Based on culture data showing 3+ Staph pseud/intermedius, 2+ P. aeruginosa, she was ultimately treated with ciprofloxacin and prednisone PO and ciprodex nebs. She initially had some improvement, but her symptoms worsened after she completed the antibiotics. Therefore we placed her on a longer course of antibiotics and steroids and advised her to seek timely care from her rheumatologist due  "to the ongoing flare. Dr Greene saw the patient 12/22/23 and recommended consideration of azathioprine; he noted that she previously did not tolerate methotrexate due to hepatic effects, and her insurance situation would likely lead to high costs with rituximab. He also recommended another prednisone burst, and completed a comprehensive screen/evaluation to assess for other contributing factors.       Ultimately she required surgery again- microDL with incision, steroid injection, balloon dilation on 6/6/24.    These cultures grew Cladosdoprium and Staph pseudointermedius    Per ENT message to ID:  \"I am seeking guidance on what antifungal to use for this patient with GPA and subglottic stenosis who grew Cladosporidium on her cultures from the OR. I had treated her previously with ciprofloxacin but she tends to have heavy sticky secretions and ongoing airway inflammation despite medical management of her GPA, so I am thinking maybe I should treat the Cladosporidium. The susceptibility panel includes various antifungals, and I am wondering if oral fluconazole would be similar enough to any of them for that to be a reasonable choice for treating her? Or if there is a different alternative I should be considering?\"    The above cultures were from 2 months ago now, and she seems ti be doing ok in terms of throat symptoms today.. Pt mostly concerned about sinusitis symptoms today. Has b/l frontal sinus pressure and post nasal drip of greenish secretions. She does not have cough, SOB, chest pain, or flu like symptoms. Does not feel like she can cough up anything for a sample today. No fevers, occasional chills, occasional sweats. Pt is currently on azathioprine and low dose prednisone. She is currently on Doxy from another provider (unclear course) for a skin and soft tissue infection related yue dog bite on left forearm. Has 2 pugs, they are older. Lives w daughter.     Past Medical History:   Diagnosis Date    Allergic " rhinitis     Allergic rhinitis, cause unspecified     Anemia     Anxiety     Balance problem     Bleeding disorder (H24)     Chronic sinusitis     Depression     Family history of thyroid problem     Hoarseness     Joint disease     Osteoarthritis     Osteoporosis     Reflux     Sinus problem     Skin disease     Thyroid disease     Tinnitus     Wesley syndrome        Past Surgical History:   Procedure Laterality Date    BUNIONECTOMY      both feet     SECTION      three    dilation for subglotic stenosis[      INJECT STEROID (LOCATION)  2013    Procedure: INJECT STEROID (LOCATION);;  Surgeon: Marcelina Jordan MD;  Location: [facility] OR    INJECT STEROID (LOCATION) N/A 2018    Procedure: INJECT STEROID (LOCATION);;  Surgeon: Marcelina Jordan MD;  Location: [facility] OR    INJECT STEROID (LOCATION) N/A 2024    Procedure: steroid injection;  Surgeon: Marcelina Jordan MD;  Location: [facility] OR    LASER CO2 LARYNGOSCOPY N/A 2024    Procedure: Microdirect laryngoscopy with incision, dilation, CO2 laser for airway stenosis;  Surgeon: Marcelina Jordan MD;  Location: [facility] OR    LASER CO2 LARYNGOSCOPY, COMPLEX  2013    Procedure: LASER CO2 LARYNGOSCOPY, COMPLEX;  Co2 Lumenis Laser Micro Direct Laryngoscopy, Incision and Dilation Subglottic stenosis, Steroid Injection, Mitomycin application. ;  Surgeon: Marcelina Jordan MD;  Location: [facility] OR    LASER CO2 LARYNGOSCOPY, COMPLEX N/A 2018    Procedure: LASER CO2 LARYNGOSCOPY, COMPLEX;  Microdirect Laryngoscopy With Incision/Dilation Of Subglottic Stenosis, Steroid Injection,  Mitomycin Application With CO2 Laser ;  Surgeon: Marcelina Jordan MD;  Location: [facility] OR    Microdirect laryngoscopy with incision, dilation, CO2 laser for airway stenosis  2024       Family History   Problem Relation Age of Onset    Hypertension Mother     Dementia Mother     Depression Mother     Thyroid Disease Mother      Migraines Mother     Hypertension Father     Alcohol/Drug Father     Depression Father     Heart Disease Brother     Cancer Brother     Thyroid Disease Daughter     Migraines Daughter         2 daughters       Social History     Social History Narrative    Not on file     Social History     Tobacco Use    Smoking status: Never    Smokeless tobacco: Never   Substance Use Topics    Alcohol use: Yes     Comment: 2 glasses wine a day    Drug use: No       Immunization History   Administered Date(s) Administered    COVID-19 12+ (2023-24) (MODERNA) 10/09/2023    COVID-19 Bivalent 12+ (Pfizer) 04/26/2023    COVID-19 Monovalent 18+ (Moderna) 03/03/2021, 04/01/2021, 11/13/2021, 08/14/2022       Patient Active Problem List   Diagnosis    Subglottic stenosis    Encounter for long-term (current) use of medications    Esophageal reflux    Hypothyroidism    Osteoporosis    Primary osteoarthritis of both knees    Calcium pyrophosphate deposition disease    Trochanteric bursitis    Wegener's granulomatosis       Current Outpatient Medications   Medication Sig Dispense Refill    ADVANCED EVENING PRIMROSE OIL OR Take  by mouth.      azaTHIOprine (IMURAN) 50 MG tablet Take 50 mg by mouth      budesonide (PULMICORT) 0.25 MG/2ML neb solution Take 2 mLs (0.25 mg) by nebulization daily Use one unit dose via nebulizer daily 60 mL 6    budesonide (PULMICORT) 0.5 MG/2ML neb solution NEBULIZE 1 UNIT DOSE BY MOUTH DAILY      calcium carbonate-vitamin D 600-200 MG-UNIT TABS Take by mouth every morning       cetirizine (ZYRTEC) 10 MG tablet Take 10 mg by mouth At Bedtime       ciprofloxacin-dexAMETHasone (CIPRODEX) 0.3-0.1 % otic suspension Add 4 drops  in nebulizer. 7.5 mL 0    dextromethorphan-guaiFENesin (MUCINEX DM)  MG per 12 hr tablet Take 1 tablet by mouth as needed       doxycycline hyclate (VIBRAMYCIN) 100 MG capsule Take 100 mg by mouth daily.      EVENING PRIMROSE OIL PO Take by mouth daily (with lunch)      ipratropium -  albuterol 0.5 mg/2.5 mg/3 mL (DUONEB) 0.5-2.5 (3) MG/3ML nebulization Take 1 ampule by nebulization every 6 hours as needed      levofloxacin (LEVAQUIN) 750 MG tablet Take 1 tablet (750 mg) by mouth daily for 5 days. 5 tablet 0    levothyroxine (SYNTHROID, LEVOTHROID) 50 MCG tablet Take 50 mcg by mouth every morning       Magnesium 400 MG TABS Take 400 mg by mouth At Bedtime      Melatonin 10 MG TABS tablet Take 10 mg by mouth At Bedtime      Multiple Vitamin (DAILY MULTIVITAMIN PO) Take by mouth daily (with lunch)       omeprazole (PRILOSEC) 20 MG DR capsule TAKE 1 CAPSULE BY MOUTH DAILY 1 HOUR BEFORE A MEAL      omeprazole 20 MG tablet Take 40 mg by mouth daily (with lunch)       Lydia LC Sprint Nebulizer Set MISC See Admin Instructions      predniSONE (DELTASONE) 10 MG tablet       predniSONE (DELTASONE) 20 MG tablet Take 2.5 mg by mouth every morning       predniSONE (DELTASONE) 5 MG tablet Take (12) 5mg tablets for two days, then take (11) 5 mg tablets for two days, then take (10) 5 mg tablets for two days, then take (9) 5 mg tablets for two days, then take (8) 5 mg tablets for two days, then take (7) 5 mg tablets for two days, then take (6) 5 mg tablets for two days, then take (5) 5 mg tablets for two days, then take (4) 5 mg for two days, then take (3) 5 mg tablets for two days, then take (2) 5 mg tablets for two days. Then resume typical prednisone dose. 114 tablet 1    sodium chloride 0.9 % neb solution Take 5 mLs by nebulization as needed for wheezing 500 mL 0    sulfamethoxazole-trimethoprim (BACTRIM) 400-80 MG tablet Take 1 tablet by mouth daily at 2 pm      sulfamethoxazole-trimethoprim (BACTRIM/SEPTRA) suspension Take 10 mg/kg/day by mouth 2 times daily Dose based on TMP component.      traZODone (DESYREL) 50 MG tablet       TRAZODONE HCL PO Take 50 mg by mouth At Bedtime        No current facility-administered medications for this visit.       Allergies   Allergen Reactions    Penicillins Anaphylaxis  "   Codeine Nausea and Vomiting    Codeine Camsylate Nausea and Vomiting    Tramadol GI Disturbance    Tramadol Hcl               Physical Exam:   Vitals were reviewed.  All vitals stable  BP (!) 148/82   Pulse 77   Temp 98.2  F (36.8  C) (Oral)   Ht 1.575 m (5' 2\")   Wt 71.7 kg (158 lb)   BMI 28.90 kg/m      Exam:  GENERAL:  well-developed, well-nourished, alert, oriented, in no acute distress.  HEENT:  Head is normocephalic, atraumatic   EYES:  Eyes have anicteric sclerae.    LUNGS:  Breathing comfortably  SKIN:  Bruises on forearm. Left forearm with puncture marks from recent dog bite near dorsal wrist  NEUROLOGIC:  Grossly nonfocal.         Laboratory Data:     Microbiology:  Last 6 Culture results with specimen source  Culture Micro   Date Value Ref Range Status   02/01/2018 (A)  Final    Moderate growth  Staphylococcus pseudintermedius  Identification obtained by MALDI-TOF mass spectrometry research use only database. Test   characteristics determined and verified by the [facility]   ([facility]) [place], [place].     02/01/2018 Culture negative after 4 weeks  Final    Specimen Description   Date Value Ref Range Status   02/01/2018 Fluid SUBGLOTTIC SECRETIONS  Final   02/01/2018 Fluid SUBGLOTTIC SECRETIONS  Final          Imaging:  Results for orders placed or performed during the hospital encounter of 02/01/18   XR Chest Port 1 View    Narrative    EXAM: XR CHEST PORT 1 VW  2/1/2018 1:11 PM      HISTORY: s/p subglottic stenosis dilation;     COMPARISON: 9/12/2013    FINDINGS: Single AP view of the chest. Approximately 4 cm long  narrowing of the upper trachea. No pneumomediastinum/pneumothorax. Low  lung volumes. Unremarkable upper abdomen and visualized bones. Streaky  left basilar opacities, likely atelectatic. Increased vascular  markings.       Impression    IMPRESSION:   1. Left basilar atelectasis.  2. Mild upper tracheal stenosis.  3. Increased edema.         I have " personally reviewed the examination and initial interpretation  and I agree with the findings.    HANNAH MATTHEW MD

## 2024-10-07 ENCOUNTER — OFFICE VISIT (OUTPATIENT)
Dept: OTOLARYNGOLOGY | Facility: CLINIC | Age: 74
End: 2024-10-07
Attending: OTOLARYNGOLOGY
Payer: COMMERCIAL

## 2024-10-07 VITALS
HEIGHT: 62 IN | BODY MASS INDEX: 28.34 KG/M2 | DIASTOLIC BLOOD PRESSURE: 80 MMHG | HEART RATE: 85 BPM | SYSTOLIC BLOOD PRESSURE: 146 MMHG | WEIGHT: 154 LBS

## 2024-10-07 DIAGNOSIS — M31.30 GRANULOMATOSIS WITH POLYANGIITIS WITHOUT RENAL INVOLVEMENT (H): ICD-10-CM

## 2024-10-07 DIAGNOSIS — Q31.0 LARYNGEAL WEB: ICD-10-CM

## 2024-10-07 DIAGNOSIS — J38.6 SUBGLOTTIC STENOSIS: Primary | ICD-10-CM

## 2024-10-07 PROCEDURE — 31622 DX BRONCHOSCOPE/WASH: CPT | Performed by: OTOLARYNGOLOGY

## 2024-10-07 PROCEDURE — 99213 OFFICE O/P EST LOW 20 MIN: CPT | Mod: 25 | Performed by: OTOLARYNGOLOGY

## 2024-10-07 RX ORDER — AZITHROMYCIN 250 MG/1
TABLET, FILM COATED ORAL
Qty: 6 TABLET | Refills: 0 | Status: SHIPPED | OUTPATIENT
Start: 2024-10-07 | End: 2024-10-12

## 2024-10-07 NOTE — PATIENT INSTRUCTIONS
1.  You were seen in the ENT Clinic today by Dr. Jordan. If you have any questions or concerns after your appointment, please call [phone]. Press option #1 for scheduling related needs. Press option #3 for Nurse advice.    2.  Dr. Jordan has recommended the following:   - Z-giulia    - Check peak flow, send message if you notice a drop in number    3.  Plan is to return to clinic in 3 months      How to Contact Us:  Send a "[employer] message to your provider. Our team will respond to you via "[employer]. Occasionally, we will need to call you to get further information.  For urgent matters (Monday-Friday, 8:00 AM-3:30 PM), call the ENT Clinic: [phone] and speak with a call center team member - they will route your call appropriately.   If you'd like to speak directly with a nurse, please call [phone]. We do our best to check voicemail frequently throughout the day, and will work to call you back within 1-2 days. For urgent matters, please use the general clinic phone numbers listed above.      Virgie Ayala RN  [phone]  [facility] - Otolaryngology

## 2024-10-07 NOTE — PROGRESS NOTES
Dear Colleague:  Susan Liu recently returned for follow-up at the [facility]. My clinic note from our visit is enclosed below.  Speech recognition software may have been used in the documentation below; input is reviewed before signature to the best of my ability.     I appreciate the ongoing opportunity to participate in this patient's care.    Please feel free to contact me with any questions.      Sincerely yours,  Marcelina Jordan M.D., M.P.H.  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 She is s/p Microdirect laryngoscopy with incision, dilation, steroid injection of subglottic stenosis on 2/1/18, and previously in 2013.    She had been lost to follow up and returned in Nov 2023 with sticky mucopurulent secretions. Based on culture data showing 3+ Staph pseud/intermedius, 2+ P. aeruginosa, she was ultimately treated with ciprofloxacin and prednisone PO and ciprodex nebs. She initially had some improvement, but her symptoms worsened after she completed the antibiotics. Therefore we placed her on a longer course of antibiotics and steroids and advised her to seek timely care from her rheumatologist due to the ongoing flare. Dr Greene saw the patient 12/22/23 and recommended consideration of azathioprine; he noted that she previously did not tolerate methotrexate due to hepatic effects, and her insurance situation would likely lead to high costs with rituximab. He also recommended another prednisone burst, and completed a comprehensive screen/evaluation to assess for other contributing factors.      Ultimately she required surgery again- microDL with incision, steroid injection, balloon dilation on  6/6/24.    Today's updates:  - Saw Dr Greene 8/13/24, she is unable to get rituximab due to cost/insurance limitations; they are increasing azathioprine dosing, continuing Bactrim, low-dose prednisone  - saw ID Dr Otto  8/21/24; the Cladosporium was felt to be a contaminant  - breathing well  - peak flows: forgot to check  - voice and swallowing are doing fine  - no new PMH/PSH; having some left hip pain      MEDICATIONS:     Current Outpatient Medications   Medication Sig Dispense Refill    ADVANCED EVENING PRIMROSE OIL OR Take  by mouth.      azaTHIOprine (IMURAN) 50 MG tablet Take 50 mg by mouth      budesonide (PULMICORT) 0.25 MG/2ML neb solution Take 2 mLs (0.25 mg) by nebulization daily Use one unit dose via nebulizer daily 60 mL 6    budesonide (PULMICORT) 0.5 MG/2ML neb solution NEBULIZE 1 UNIT DOSE BY MOUTH DAILY      calcium carbonate-vitamin D 600-200 MG-UNIT TABS Take by mouth every morning       cetirizine (ZYRTEC) 10 MG tablet Take 10 mg by mouth At Bedtime       ciprofloxacin-dexAMETHasone (CIPRODEX) 0.3-0.1 % otic suspension Add 4 drops  in nebulizer. 7.5 mL 0    dextromethorphan-guaiFENesin (MUCINEX DM)  MG per 12 hr tablet Take 1 tablet by mouth as needed       doxycycline hyclate (VIBRAMYCIN) 100 MG capsule Take 100 mg by mouth daily.      EVENING PRIMROSE OIL PO Take by mouth daily (with lunch)      ipratropium - albuterol 0.5 mg/2.5 mg/3 mL (DUONEB) 0.5-2.5 (3) MG/3ML nebulization Take 1 ampule by nebulization every 6 hours as needed      levothyroxine (SYNTHROID, LEVOTHROID) 50 MCG tablet Take 50 mcg by mouth every morning       Magnesium 400 MG TABS Take 400 mg by mouth At Bedtime      Melatonin 10 MG TABS tablet Take 10 mg by mouth At Bedtime      Multiple Vitamin (DAILY MULTIVITAMIN PO) Take by mouth daily (with lunch)       omeprazole (PRILOSEC) 20 MG DR capsule TAKE 1 CAPSULE BY MOUTH DAILY 1 HOUR BEFORE A MEAL      omeprazole 20 MG tablet Take 40 mg by mouth daily (with lunch)        Lydia LC Sprint Nebulizer Set MISC See Admin Instructions      predniSONE (DELTASONE) 10 MG tablet       predniSONE (DELTASONE) 20 MG tablet Take 2.5 mg by mouth every morning       predniSONE (DELTASONE) 5 MG tablet Take (12) 5mg tablets for two days, then take (11) 5 mg tablets for two days, then take (10) 5 mg tablets for two days, then take (9) 5 mg tablets for two days, then take (8) 5 mg tablets for two days, then take (7) 5 mg tablets for two days, then take (6) 5 mg tablets for two days, then take (5) 5 mg tablets for two days, then take (4) 5 mg for two days, then take (3) 5 mg tablets for two days, then take (2) 5 mg tablets for two days. Then resume typical prednisone dose. 114 tablet 1    sodium chloride 0.9 % neb solution Take 5 mLs by nebulization as needed for wheezing 500 mL 0    sulfamethoxazole-trimethoprim (BACTRIM) 400-80 MG tablet Take 1 tablet by mouth daily at 2 pm      sulfamethoxazole-trimethoprim (BACTRIM/SEPTRA) suspension Take 10 mg/kg/day by mouth 2 times daily Dose based on TMP component.      traZODone (DESYREL) 50 MG tablet       TRAZODONE HCL PO Take 50 mg by mouth At Bedtime          ALLERGIES:    Allergies   Allergen Reactions    Penicillins Anaphylaxis    Codeine Nausea and Vomiting    Codeine Camsylate Nausea and Vomiting    Tramadol GI Disturbance    Tramadol Hcl        NEW PMH/PSH: None    REVIEW OF SYSTEMS:  The patient completed a comprehensive 11 point review of systems (below), which was reviewed. Positives are as noted below.  Patient Supplied Answers to Review of Systems      7/1/2024    11:47 AM    ENT ROS   Ears, Nose, Throat Sore throat   Hematologic Easy bruising          PHYSICAL EXAM:  General: The patient was alert and conversant, and in no acute distress. Patient accompanied by her daughter.  Oral cavity/oropharynx: No masses or lesions. Dentition unchanged since prior. Tongue mobility and palate elevation intact and symmetric.  Neck: No palpable cervical  lymphadenopathy, no significant tenderness to palpation of the thyrohyoid space, which was narrow. No obvious thyroid abnormality.  Resp: Breathing comfortably, no stridor or stertor.  Neuro: Symmetric facial function. Other cranial nerve function as documented above.  Psych: Normal affect, pleasant and cooperative.  Voice/speech: Mild dysphonia characterized by breathiness, roughness, and strain.       Procedure:   Flexible Bronchoscopy (80811)  Indications: This procedure was warranted to evaluate the patient's airway anatomy. Risks, benefits, and alternatives were discussed.  Description: After informed consent was obtained, a time-out was performed to confirm patient identity, procedure, and procedure site. Topical 3% lidocaine with 0.25% phenylephrine was applied to the nasal cavities and oropharynx.  I performed the endoscopy and no complications were apparent.  Performed by: Marcelina Jordan MD MPH  Findings: Glottis patent with good bilateral vocal fold mobility. Infraglottic circumferential web, not obstructive. Subglottis with moderate stenosis, heavy crusting/purulence both in subglottis and cervical trachea. Trachea clear distally. Carol sharp. Unremarkable takeoffs of mainstem bronchi.                              IMPRESSION AND PLAN:   Susan Liu returns with a stable airway, persistent sticky/purulent secretions. Functionally she is doing well.    Plan:  1) given persistent crusting/purulent-appearing secretions, will treat with empiric antibiotics; at last cultures, she had some resistance to Bactrim, so we will plan a course of azithromycin (Z-pack)  2) check peak flow, send us a message with update  3) RTC 3 months or sooner as needed; if breathing well with stable peak flows, also okay to postpone a few months.    I spent a total of 27 minutes on 10/7/2024 in chart review, review of tests, patient visit, documentation, care coordination, and/or discussion with other providers about the  issues documented above, separate from any documented procedure(s).

## 2024-10-07 NOTE — LETTER
10/7/2024      RE: Susan Liu  [address] [place] [address]       Dear Colleague:  Susan Liu recently returned for follow-up at the [facility]. My clinic note from our visit is enclosed below.  Speech recognition software may have been used in the documentation below; input is reviewed before signature to the best of my ability.     I appreciate the ongoing opportunity to participate in this patient's care.    Please feel free to contact me with any questions.      Sincerely yours,  Marcelina Jordan M.D., M.P.H.  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 She is s/p Microdirect laryngoscopy with incision, dilation, steroid injection of subglottic stenosis on 2/1/18, and previously in 2013.    She had been lost to follow up and returned in Nov 2023 with sticky mucopurulent secretions. Based on culture data showing 3+ Staph pseud/intermedius, 2+ P. aeruginosa, she was ultimately treated with ciprofloxacin and prednisone PO and ciprodex nebs. She initially had some improvement, but her symptoms worsened after she completed the antibiotics. Therefore we placed her on a longer course of antibiotics and steroids and advised her to seek timely care from her rheumatologist due to the ongoing flare. Dr Greene saw the patient 12/22/23 and recommended consideration of azathioprine; he noted that she previously did not tolerate methotrexate due to hepatic effects, and her insurance situation would likely lead to high costs with rituximab. He also recommended another prednisone burst, and completed a comprehensive screen/evaluation to assess for other contributing factors.      Ultimately she required surgery  again- microDL with incision, steroid injection, balloon dilation on 6/6/24.    Today's updates:  - Saw Dr Greene 8/13/24, she is unable to get rituximab due to cost/insurance limitations; they are increasing azathioprine dosing, continuing Bactrim, low-dose prednisone  - saw ID Dr Otto  8/21/24; the Cladosporium was felt to be a contaminant  - breathing well  - peak flows: forgot to check  - voice and swallowing are doing fine  - no new PMH/PSH; having some left hip pain      MEDICATIONS:     Current Outpatient Medications   Medication Sig Dispense Refill     ADVANCED EVENING PRIMROSE OIL OR Take  by mouth.       azaTHIOprine (IMURAN) 50 MG tablet Take 50 mg by mouth       budesonide (PULMICORT) 0.25 MG/2ML neb solution Take 2 mLs (0.25 mg) by nebulization daily Use one unit dose via nebulizer daily 60 mL 6     budesonide (PULMICORT) 0.5 MG/2ML neb solution NEBULIZE 1 UNIT DOSE BY MOUTH DAILY       calcium carbonate-vitamin D 600-200 MG-UNIT TABS Take by mouth every morning        cetirizine (ZYRTEC) 10 MG tablet Take 10 mg by mouth At Bedtime        ciprofloxacin-dexAMETHasone (CIPRODEX) 0.3-0.1 % otic suspension Add 4 drops  in nebulizer. 7.5 mL 0     dextromethorphan-guaiFENesin (MUCINEX DM)  MG per 12 hr tablet Take 1 tablet by mouth as needed        doxycycline hyclate (VIBRAMYCIN) 100 MG capsule Take 100 mg by mouth daily.       EVENING PRIMROSE OIL PO Take by mouth daily (with lunch)       ipratropium - albuterol 0.5 mg/2.5 mg/3 mL (DUONEB) 0.5-2.5 (3) MG/3ML nebulization Take 1 ampule by nebulization every 6 hours as needed       levothyroxine (SYNTHROID, LEVOTHROID) 50 MCG tablet Take 50 mcg by mouth every morning        Magnesium 400 MG TABS Take 400 mg by mouth At Bedtime       Melatonin 10 MG TABS tablet Take 10 mg by mouth At Bedtime       Multiple Vitamin (DAILY MULTIVITAMIN PO) Take by mouth daily (with lunch)        omeprazole (PRILOSEC) 20 MG DR capsule TAKE 1 CAPSULE BY MOUTH DAILY 1 HOUR  BEFORE A MEAL       omeprazole 20 MG tablet Take 40 mg by mouth daily (with lunch)        Lydia LC Sprint Nebulizer Set MISC See Admin Instructions       predniSONE (DELTASONE) 10 MG tablet        predniSONE (DELTASONE) 20 MG tablet Take 2.5 mg by mouth every morning        predniSONE (DELTASONE) 5 MG tablet Take (12) 5mg tablets for two days, then take (11) 5 mg tablets for two days, then take (10) 5 mg tablets for two days, then take (9) 5 mg tablets for two days, then take (8) 5 mg tablets for two days, then take (7) 5 mg tablets for two days, then take (6) 5 mg tablets for two days, then take (5) 5 mg tablets for two days, then take (4) 5 mg for two days, then take (3) 5 mg tablets for two days, then take (2) 5 mg tablets for two days. Then resume typical prednisone dose. 114 tablet 1     sodium chloride 0.9 % neb solution Take 5 mLs by nebulization as needed for wheezing 500 mL 0     sulfamethoxazole-trimethoprim (BACTRIM) 400-80 MG tablet Take 1 tablet by mouth daily at 2 pm       sulfamethoxazole-trimethoprim (BACTRIM/SEPTRA) suspension Take 10 mg/kg/day by mouth 2 times daily Dose based on TMP component.       traZODone (DESYREL) 50 MG tablet        TRAZODONE HCL PO Take 50 mg by mouth At Bedtime          ALLERGIES:    Allergies   Allergen Reactions     Penicillins Anaphylaxis     Codeine Nausea and Vomiting     Codeine Camsylate Nausea and Vomiting     Tramadol GI Disturbance     Tramadol Hcl        NEW PMH/PSH: None    REVIEW OF SYSTEMS:  The patient completed a comprehensive 11 point review of systems (below), which was reviewed. Positives are as noted below.  Patient Supplied Answers to Review of Systems      7/1/2024    11:47 AM    ENT ROS   Ears, Nose, Throat Sore throat   Hematologic Easy bruising          PHYSICAL EXAM:  General: The patient was alert and conversant, and in no acute distress. Patient accompanied by her daughter.  Oral cavity/oropharynx: No masses or lesions. Dentition unchanged  since prior. Tongue mobility and palate elevation intact and symmetric.  Neck: No palpable cervical lymphadenopathy, no significant tenderness to palpation of the thyrohyoid space, which was narrow. No obvious thyroid abnormality.  Resp: Breathing comfortably, no stridor or stertor.  Neuro: Symmetric facial function. Other cranial nerve function as documented above.  Psych: Normal affect, pleasant and cooperative.  Voice/speech: Mild dysphonia characterized by breathiness, roughness, and strain.       Procedure:   Flexible Bronchoscopy (72535)  Indications: This procedure was warranted to evaluate the patient's airway anatomy. Risks, benefits, and alternatives were discussed.  Description: After informed consent was obtained, a time-out was performed to confirm patient identity, procedure, and procedure site. Topical 3% lidocaine with 0.25% phenylephrine was applied to the nasal cavities and oropharynx.  I performed the endoscopy and no complications were apparent.  Performed by: Marcelina Jordan MD MPH  Findings: Glottis patent with good bilateral vocal fold mobility. Infraglottic circumferential web, not obstructive. Subglottis with moderate stenosis, heavy crusting/purulence both in subglottis and cervical trachea. Trachea clear distally. Carol sharp. Unremarkable takeoffs of mainstem bronchi.                              IMPRESSION AND PLAN:   Susan Liu returns with a stable airway, persistent sticky/purulent secretions. Functionally she is doing well.    Plan:  1) given persistent crusting/purulent-appearing secretions, will treat with empiric antibiotics; at last cultures, she had some resistance to Bactrim, so we will plan a course of azithromycin (Z-pack)  2) check peak flow, send us a message with update  3) RTC 3 months or sooner as needed; if breathing well with stable peak flows, also okay to postpone a few months.    I spent a total of 27 minutes on 10/7/2024 in chart review, review of tests,  patient visit, documentation, care coordination, and/or discussion with other providers about the issues documented above, separate from any documented procedure(s).      Marcelina Jordan MD

## 2025-01-27 ENCOUNTER — OFFICE VISIT (OUTPATIENT)
Dept: OTOLARYNGOLOGY | Facility: CLINIC | Age: 75
End: 2025-01-27
Payer: COMMERCIAL

## 2025-01-27 VITALS — WEIGHT: 144 LBS | HEIGHT: 62 IN | BODY MASS INDEX: 26.5 KG/M2

## 2025-01-27 DIAGNOSIS — Q31.0 LARYNGEAL WEB: ICD-10-CM

## 2025-01-27 DIAGNOSIS — J30.2 SEASONAL ALLERGIC RHINITIS, UNSPECIFIED TRIGGER: ICD-10-CM

## 2025-01-27 DIAGNOSIS — M31.30 GRANULOMATOSIS WITH POLYANGIITIS WITHOUT RENAL INVOLVEMENT (H): ICD-10-CM

## 2025-01-27 DIAGNOSIS — J38.6 SUBGLOTTIC STENOSIS: Primary | ICD-10-CM

## 2025-01-27 DIAGNOSIS — J42: ICD-10-CM

## 2025-01-27 PROCEDURE — 99214 OFFICE O/P EST MOD 30 MIN: CPT | Mod: 25 | Performed by: OTOLARYNGOLOGY

## 2025-01-27 PROCEDURE — 31622 DX BRONCHOSCOPE/WASH: CPT | Performed by: OTOLARYNGOLOGY

## 2025-01-27 NOTE — LETTER
1/27/2025      RE: Susan Liu  [address] [place] [address]       Dear Colleague:  Susan Liu recently returned for follow-up at the [facility]. My clinic note from our visit is enclosed below.  Speech recognition software may have been used in the documentation below; please contact the clinic with questions.     I appreciate the ongoing opportunity to participate in this patient's care.    Please feel free to contact me with any questions.      Sincerely yours,  Marcelina Jordan M.D., M.P.H.  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 She is s/p Microdirect laryngoscopy with incision, dilation, steroid injection of subglottic stenosis on 2/1/18, and previously in 2013.    She had been lost to follow up and returned in Nov 2023 with sticky mucopurulent secretions. Based on culture data showing 3+ Staph pseud/intermedius, 2+ P. aeruginosa, she was ultimately treated with ciprofloxacin and prednisone PO and ciprodex nebs. She initially had some improvement, but her symptoms worsened after she completed the antibiotics. Therefore we placed her on a longer course of antibiotics and steroids and advised her to seek timely care from her rheumatologist due to the ongoing flare. Dr Greene saw the patient 12/22/23 and recommended consideration of azathioprine; he noted that she previously did not tolerate methotrexate due to hepatic effects, and her insurance situation would likely lead to high costs with rituximab. He also recommended another prednisone burst, and completed a comprehensive screen/evaluation to assess for other contributing factors.      Ultimately she required surgery again- microDL with  incision, steroid injection, balloon dilation on 6/6/24.  Saw Dr Greene 8/13/24, she was unable to get rituximab due to cost/insurance limitations so they increased azathioprine dosing, continuing Bactrim, low-dose prednisone.  Saw ID, Dr Otto, 8/21/24; the Cladosporium was felt to be a contaminant; treated with levofloxacin for acute sinusitis, recommended sputum culture    Today's updates:  - hasn't been checking peak flow bc of caregiving duties  - breathing has been good, but breathing cold air seems to make her breathing worse  - also feels that allergies and certain exposures contribute to drainage/congestion; saw an allergist at  many years ago but not lately  - not coughing up much stuff so has not completed sputum cultures  - overall is doing better, but is not sure if the azithromycin helped; she thinks it probably did  - no new PMH/PSH since last seen; is changing osteoporosis medications      MEDICATIONS:     Current Outpatient Medications   Medication Sig Dispense Refill     ADVANCED EVENING PRIMROSE OIL OR Take  by mouth.       budesonide (PULMICORT) 0.25 MG/2ML neb solution Take 2 mLs (0.25 mg) by nebulization daily Use one unit dose via nebulizer daily 60 mL 6     budesonide (PULMICORT) 0.5 MG/2ML neb solution NEBULIZE 1 UNIT DOSE BY MOUTH DAILY       calcium carbonate-vitamin D 600-200 MG-UNIT TABS Take by mouth every morning        cetirizine (ZYRTEC) 10 MG tablet Take 10 mg by mouth At Bedtime        ciprofloxacin-dexAMETHasone (CIPRODEX) 0.3-0.1 % otic suspension Add 4 drops  in nebulizer. 7.5 mL 0     dextromethorphan-guaiFENesin (MUCINEX DM)  MG per 12 hr tablet Take 1 tablet by mouth as needed        doxycycline hyclate (VIBRAMYCIN) 100 MG capsule Take 100 mg by mouth daily.       EVENING PRIMROSE OIL PO Take by mouth daily (with lunch)       ipratropium - albuterol 0.5 mg/2.5 mg/3 mL (DUONEB) 0.5-2.5 (3) MG/3ML nebulization Take 1 ampule by nebulization every 6 hours as needed        levothyroxine (SYNTHROID, LEVOTHROID) 50 MCG tablet Take 50 mcg by mouth every morning        Magnesium 400 MG TABS Take 400 mg by mouth At Bedtime       Melatonin 10 MG TABS tablet Take 10 mg by mouth At Bedtime       Multiple Vitamin (DAILY MULTIVITAMIN PO) Take by mouth daily (with lunch)        omeprazole (PRILOSEC) 20 MG DR capsule TAKE 1 CAPSULE BY MOUTH DAILY 1 HOUR BEFORE A MEAL       omeprazole 20 MG tablet Take 40 mg by mouth daily (with lunch)        Lydia LC Sprint Nebulizer Set MISC See Admin Instructions       predniSONE (DELTASONE) 10 MG tablet        predniSONE (DELTASONE) 20 MG tablet Take 2.5 mg by mouth every morning        predniSONE (DELTASONE) 5 MG tablet Take (12) 5mg tablets for two days, then take (11) 5 mg tablets for two days, then take (10) 5 mg tablets for two days, then take (9) 5 mg tablets for two days, then take (8) 5 mg tablets for two days, then take (7) 5 mg tablets for two days, then take (6) 5 mg tablets for two days, then take (5) 5 mg tablets for two days, then take (4) 5 mg for two days, then take (3) 5 mg tablets for two days, then take (2) 5 mg tablets for two days. Then resume typical prednisone dose. 114 tablet 1     sodium chloride 0.9 % neb solution Take 5 mLs by nebulization as needed for wheezing 500 mL 0     sulfamethoxazole-trimethoprim (BACTRIM) 400-80 MG tablet Take 1 tablet by mouth daily at 2 pm       sulfamethoxazole-trimethoprim (BACTRIM/SEPTRA) suspension Take 10 mg/kg/day by mouth 2 times daily Dose based on TMP component.       traZODone (DESYREL) 50 MG tablet        TRAZODONE HCL PO Take 50 mg by mouth At Bedtime        azaTHIOprine (IMURAN) 50 MG tablet Take 50 mg by mouth         ALLERGIES:    Allergies   Allergen Reactions     Penicillins Anaphylaxis     Codeine Nausea and Vomiting     Codeine Camsylate Nausea and Vomiting     Tramadol GI Disturbance     Tramadol Hcl        NEW PMH/PSH: None    REVIEW OF SYSTEMS:  The patient completed a comprehensive 11  point review of systems (below), which was reviewed. Positives are as noted below.  Patient Supplied Answers to Review of Systems      1/27/2025     9:55 AM   [facility] ROS   Constitutional Weight loss       PHYSICAL EXAM:  General: The patient was alert and conversant, and in no acute distress. Patient accompanied by her daughter.  Oral cavity/oropharynx: No masses or lesions. Tongue mobility and palate elevation intact and symmetric.  Neck: No palpable cervical lymphadenopathy, no significant tenderness to palpation of the thyrohyoid space, which was narrow. No obvious thyroid abnormality.  Resp: Breathing comfortably, no stridor or stertor.  Neuro: Symmetric facial function. Other cranial nerve function as documented above.  Psych: Normal affect, pleasant and cooperative.  Voice/speech: Mild dysphonia characterized by breathiness.    Procedure:   Flexible Bronchoscopy (04187)  Indications: This procedure was warranted to evaluate the patient's airway anatomy. Risks, benefits, and alternatives were discussed.  Description: After informed consent was obtained, a time-out was performed to confirm patient identity, procedure, and procedure site. Topical 3% lidocaine with 0.25% phenylephrine was applied to the nasal cavities and oropharynx. I performed the endoscopy and no complications were apparent.  Performed by: Marcelina Jordan MD MPH  Findings: Glottis patent with good bilateral vocal fold mobility. Stable mild to moderate infraglottic stenosis. Stable moderate subglottic stenosis. Moderate crusting/secretions. Trachea patent distally. Carol sharp. Unremarkable takeoffs of mainstem bronchi.                                                    IMPRESSION AND PLAN:   Susan Liu returns with stable airway patency and slightly reduced, but still persistent, airway crusting and secretions.    Plan  -needs new peak flow meter; our RN will meet with her today to provide this and review the instructions for use  -could  consider allergy evaluation as she feels allergy symptoms contribute to airway congestion; she is comfortable proceeding with this so we will place a referral  -nebulizer as needed  -RTC 3-4 months, or sooner as needed (e.g. worsening peak flows, worsening breathing, worsening congestion, etc.). She and her daughter were comfortable with this plan.    I spent a total of 32 minutes on 1/27/2025 in chart review, review of tests, patient visit, documentation, care coordination, and/or discussion with other providers about the issues documented above, separate from any documented procedure(s).      Marcelina Jordan MD

## 2025-01-27 NOTE — PATIENT INSTRUCTIONS
1.  You were seen in the ENT Clinic today by . If you have any questions or concerns after your appointment, please call [phone]. Press option #1 for scheduling related needs. Press option #3 for Nurse advice.    2.   has recommended the following:   - peak flow meter and instruction will be provided today.continue to monitor this   - allergy referral placed to address congestion. They will contact you for scheduling   - use nebulizer as needed    3.  Plan is to return to clinic in 3-4 months      Katie Mckeon LPN  [phone]  [facility] - Otolaryngology

## 2025-01-27 NOTE — PROGRESS NOTES
Dear Colleague:  Susan Liu recently returned for follow-up at the [facility]. My clinic note from our visit is enclosed below.  Speech recognition software may have been used in the documentation below; please contact the clinic with questions.     I appreciate the ongoing opportunity to participate in this patient's care.    Please feel free to contact me with any questions.      Sincerely yours,  Marcelina Jordan M.D., M.P.H.  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 She is s/p Microdirect laryngoscopy with incision, dilation, steroid injection of subglottic stenosis on 2/1/18, and previously in 2013.    She had been lost to follow up and returned in Nov 2023 with sticky mucopurulent secretions. Based on culture data showing 3+ Staph pseud/intermedius, 2+ P. aeruginosa, she was ultimately treated with ciprofloxacin and prednisone PO and ciprodex nebs. She initially had some improvement, but her symptoms worsened after she completed the antibiotics. Therefore we placed her on a longer course of antibiotics and steroids and advised her to seek timely care from her rheumatologist due to the ongoing flare. Dr Greene saw the patient 12/22/23 and recommended consideration of azathioprine; he noted that she previously did not tolerate methotrexate due to hepatic effects, and her insurance situation would likely lead to high costs with rituximab. He also recommended another prednisone burst, and completed a comprehensive screen/evaluation to assess for other contributing factors.      Ultimately she required surgery again- microDL with incision, steroid injection, balloon dilation on 6/6/24.  Saw Dr Greene  8/13/24, she was unable to get rituximab due to cost/insurance limitations so they increased azathioprine dosing, continuing Bactrim, low-dose prednisone.  Saw ID, Dr Otto, 8/21/24; the Cladosporium was felt to be a contaminant; treated with levofloxacin for acute sinusitis, recommended sputum culture    Today's updates:  - hasn't been checking peak flow bc of caregiving duties  - breathing has been good, but breathing cold air seems to make her breathing worse  - also feels that allergies and certain exposures contribute to drainage/congestion; saw an allergist at  many years ago but not lately  - not coughing up much stuff so has not completed sputum cultures  - overall is doing better, but is not sure if the azithromycin helped; she thinks it probably did  - no new PMH/PSH since last seen; is changing osteoporosis medications      MEDICATIONS:     Current Outpatient Medications   Medication Sig Dispense Refill    ADVANCED EVENING PRIMROSE OIL OR Take  by mouth.      budesonide (PULMICORT) 0.25 MG/2ML neb solution Take 2 mLs (0.25 mg) by nebulization daily Use one unit dose via nebulizer daily 60 mL 6    budesonide (PULMICORT) 0.5 MG/2ML neb solution NEBULIZE 1 UNIT DOSE BY MOUTH DAILY      calcium carbonate-vitamin D 600-200 MG-UNIT TABS Take by mouth every morning       cetirizine (ZYRTEC) 10 MG tablet Take 10 mg by mouth At Bedtime       ciprofloxacin-dexAMETHasone (CIPRODEX) 0.3-0.1 % otic suspension Add 4 drops  in nebulizer. 7.5 mL 0    dextromethorphan-guaiFENesin (MUCINEX DM)  MG per 12 hr tablet Take 1 tablet by mouth as needed       doxycycline hyclate (VIBRAMYCIN) 100 MG capsule Take 100 mg by mouth daily.      EVENING PRIMROSE OIL PO Take by mouth daily (with lunch)      ipratropium - albuterol 0.5 mg/2.5 mg/3 mL (DUONEB) 0.5-2.5 (3) MG/3ML nebulization Take 1 ampule by nebulization every 6 hours as needed      levothyroxine (SYNTHROID, LEVOTHROID) 50 MCG tablet Take 50 mcg by mouth every  morning       Magnesium 400 MG TABS Take 400 mg by mouth At Bedtime      Melatonin 10 MG TABS tablet Take 10 mg by mouth At Bedtime      Multiple Vitamin (DAILY MULTIVITAMIN PO) Take by mouth daily (with lunch)       omeprazole (PRILOSEC) 20 MG DR capsule TAKE 1 CAPSULE BY MOUTH DAILY 1 HOUR BEFORE A MEAL      omeprazole 20 MG tablet Take 40 mg by mouth daily (with lunch)       Lydia LC Sprint Nebulizer Set MISC See Admin Instructions      predniSONE (DELTASONE) 10 MG tablet       predniSONE (DELTASONE) 20 MG tablet Take 2.5 mg by mouth every morning       predniSONE (DELTASONE) 5 MG tablet Take (12) 5mg tablets for two days, then take (11) 5 mg tablets for two days, then take (10) 5 mg tablets for two days, then take (9) 5 mg tablets for two days, then take (8) 5 mg tablets for two days, then take (7) 5 mg tablets for two days, then take (6) 5 mg tablets for two days, then take (5) 5 mg tablets for two days, then take (4) 5 mg for two days, then take (3) 5 mg tablets for two days, then take (2) 5 mg tablets for two days. Then resume typical prednisone dose. 114 tablet 1    sodium chloride 0.9 % neb solution Take 5 mLs by nebulization as needed for wheezing 500 mL 0    sulfamethoxazole-trimethoprim (BACTRIM) 400-80 MG tablet Take 1 tablet by mouth daily at 2 pm      sulfamethoxazole-trimethoprim (BACTRIM/SEPTRA) suspension Take 10 mg/kg/day by mouth 2 times daily Dose based on TMP component.      traZODone (DESYREL) 50 MG tablet       TRAZODONE HCL PO Take 50 mg by mouth At Bedtime       azaTHIOprine (IMURAN) 50 MG tablet Take 50 mg by mouth         ALLERGIES:    Allergies   Allergen Reactions    Penicillins Anaphylaxis    Codeine Nausea and Vomiting    Codeine Camsylate Nausea and Vomiting    Tramadol GI Disturbance    Tramadol Hcl        NEW PMH/PSH: None    REVIEW OF SYSTEMS:  The patient completed a comprehensive 11 point review of systems (below), which was reviewed. Positives are as noted below.  Patient  Supplied Answers to Review of Systems      1/27/2025     9:55 AM    ENT ROS   Constitutional Weight loss       PHYSICAL EXAM:  General: The patient was alert and conversant, and in no acute distress. Patient accompanied by her daughter.  Oral cavity/oropharynx: No masses or lesions. Tongue mobility and palate elevation intact and symmetric.  Neck: No palpable cervical lymphadenopathy, no significant tenderness to palpation of the thyrohyoid space, which was narrow. No obvious thyroid abnormality.  Resp: Breathing comfortably, no stridor or stertor.  Neuro: Symmetric facial function. Other cranial nerve function as documented above.  Psych: Normal affect, pleasant and cooperative.  Voice/speech: Mild dysphonia characterized by breathiness.    Procedure:   Flexible Bronchoscopy (79382)  Indications: This procedure was warranted to evaluate the patient's airway anatomy. Risks, benefits, and alternatives were discussed.  Description: After informed consent was obtained, a time-out was performed to confirm patient identity, procedure, and procedure site. Topical 3% lidocaine with 0.25% phenylephrine was applied to the nasal cavities and oropharynx. I performed the endoscopy and no complications were apparent.  Performed by: Marcelina Jordan MD MPH  Findings: Glottis patent with good bilateral vocal fold mobility. Stable mild to moderate infraglottic stenosis. Stable moderate subglottic stenosis. Moderate crusting/secretions. Trachea patent distally. Carol sharp. Unremarkable takeoffs of mainstem bronchi.                                                    IMPRESSION AND PLAN:   Susan Liu returns with stable airway patency and slightly reduced, but still persistent, airway crusting and secretions.    Plan  -needs new peak flow meter; our RN will meet with her today to provide this and review the instructions for use  -could consider allergy evaluation as she feels allergy symptoms contribute to airway congestion;  she is comfortable proceeding with this so we will place a referral  -nebulizer as needed  -RTC 3-4 months, or sooner as needed (e.g. worsening peak flows, worsening breathing, worsening congestion, etc.). She and her daughter were comfortable with this plan.    I spent a total of 32 minutes on 1/27/2025 in chart review, review of tests, patient visit, documentation, care coordination, and/or discussion with other providers about the issues documented above, separate from any documented procedure(s).

## 2025-04-21 ENCOUNTER — OFFICE VISIT (OUTPATIENT)
Dept: OTOLARYNGOLOGY | Facility: CLINIC | Age: 75
End: 2025-04-21
Payer: COMMERCIAL

## 2025-04-21 VITALS
BODY MASS INDEX: 25.96 KG/M2 | WEIGHT: 141.1 LBS | SYSTOLIC BLOOD PRESSURE: 157 MMHG | DIASTOLIC BLOOD PRESSURE: 94 MMHG | OXYGEN SATURATION: 99 % | HEIGHT: 62 IN | HEART RATE: 86 BPM

## 2025-04-21 DIAGNOSIS — M31.30 GRANULOMATOSIS WITH POLYANGIITIS WITHOUT RENAL INVOLVEMENT (H): ICD-10-CM

## 2025-04-21 DIAGNOSIS — J30.2 SEASONAL ALLERGIC RHINITIS, UNSPECIFIED TRIGGER: ICD-10-CM

## 2025-04-21 DIAGNOSIS — J42: ICD-10-CM

## 2025-04-21 DIAGNOSIS — Q31.0 LARYNGEAL WEB: ICD-10-CM

## 2025-04-21 DIAGNOSIS — J38.6 SUBGLOTTIC STENOSIS: Primary | ICD-10-CM

## 2025-04-21 ASSESSMENT — PAIN SCALES - GENERAL: PAINLEVEL_OUTOF10: NO PAIN (0)

## 2025-04-21 NOTE — LETTER
4/21/2025      RE: Susan Liu  [address] [place] [address]       Dear Colleague:  Susan Liu recently returned for follow-up at the [facility]. My clinic note from our visit is enclosed below.  Speech recognition software may have been used in the documentation below; input is reviewed before signature to the best of my ability.     I appreciate the ongoing opportunity to participate in this patient's care.    Please feel free to contact me with any questions.      Sincerely yours,  Marcelina Jordan M.D., M.P.H.  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 She is s/p Microdirect laryngoscopy with incision, dilation, steroid injection of subglottic stenosis on 2/1/18, and previously in 2013.    She had been lost to follow up and returned in Nov 2023 with sticky mucopurulent secretions. Based on culture data showing 3+ Staph pseud/intermedius, 2+ P. aeruginosa, she was ultimately treated with ciprofloxacin and prednisone PO and ciprodex nebs. She initially had some improvement, but her symptoms worsened after she completed the antibiotics. Therefore we placed her on a longer course of antibiotics and steroids and advised her to seek timely care from her rheumatologist due to the ongoing flare. Dr Greene saw the patient 12/22/23 and recommended consideration of azathioprine; he noted that she previously did not tolerate methotrexate due to hepatic effects, and her insurance situation would likely lead to high costs with rituximab. He also recommended another prednisone burst, and completed a comprehensive screen/evaluation to assess for other contributing factors.      Ultimately she required surgery  again- microDL with incision, steroid injection, balloon dilation on 6/6/24.  Saw Dr Greene 8/13/24; she was unable to get rituximab due to cost/insurance limitations so they increased azathioprine dosing, continuing Bactrim, low-dose prednisone.  Saw ID, Dr Otto, 8/21/24; Cladosporium was felt to be a contaminant; treated with levofloxacin for acute sinusitis, recommended sputum culture if symptoms persist    Today's updates:  - Allergy referral is still pending (more history in 1/27/25 note)  - Peak flows tend to be around 280  - Having difficulty tolerating the azathioprine because of nausea; has not reached out to Dr Greene's office yet  - Does feel like the azathioprine is helping with the heavy sputum in her throat  - voice, swallowing are fine  - no new PMH/PSH otherwise      MEDICATIONS:     Current Outpatient Medications   Medication Sig Dispense Refill     ADVANCED EVENING PRIMROSE OIL OR Take  by mouth.       azaTHIOprine (IMURAN) 50 MG tablet Take 50 mg by mouth       budesonide (PULMICORT) 0.25 MG/2ML neb solution Take 2 mLs (0.25 mg) by nebulization daily Use one unit dose via nebulizer daily 60 mL 6     budesonide (PULMICORT) 0.5 MG/2ML neb solution NEBULIZE 1 UNIT DOSE BY MOUTH DAILY       calcium carbonate-vitamin D 600-200 MG-UNIT TABS Take by mouth every morning        cetirizine (ZYRTEC) 10 MG tablet Take 10 mg by mouth At Bedtime        ciprofloxacin-dexAMETHasone (CIPRODEX) 0.3-0.1 % otic suspension Add 4 drops  in nebulizer. 7.5 mL 0     dextromethorphan-guaiFENesin (MUCINEX DM)  MG per 12 hr tablet Take 1 tablet by mouth as needed        doxycycline hyclate (VIBRAMYCIN) 100 MG capsule Take 100 mg by mouth daily.       EVENING PRIMROSE OIL PO Take by mouth daily (with lunch)       ipratropium - albuterol 0.5 mg/2.5 mg/3 mL (DUONEB) 0.5-2.5 (3) MG/3ML nebulization Take 1 ampule by nebulization every 6 hours as needed       levothyroxine (SYNTHROID, LEVOTHROID) 50 MCG tablet Take 50 mcg by  mouth every morning        Magnesium 400 MG TABS Take 400 mg by mouth At Bedtime       Melatonin 10 MG TABS tablet Take 10 mg by mouth At Bedtime       Multiple Vitamin (DAILY MULTIVITAMIN PO) Take by mouth daily (with lunch)        omeprazole (PRILOSEC) 20 MG DR capsule TAKE 1 CAPSULE BY MOUTH DAILY 1 HOUR BEFORE A MEAL       omeprazole 20 MG tablet Take 40 mg by mouth daily (with lunch)        Lydia LC Sprint Nebulizer Set MISC See Admin Instructions       predniSONE (DELTASONE) 10 MG tablet        predniSONE (DELTASONE) 20 MG tablet Take 2.5 mg by mouth every morning        predniSONE (DELTASONE) 5 MG tablet Take (12) 5mg tablets for two days, then take (11) 5 mg tablets for two days, then take (10) 5 mg tablets for two days, then take (9) 5 mg tablets for two days, then take (8) 5 mg tablets for two days, then take (7) 5 mg tablets for two days, then take (6) 5 mg tablets for two days, then take (5) 5 mg tablets for two days, then take (4) 5 mg for two days, then take (3) 5 mg tablets for two days, then take (2) 5 mg tablets for two days. Then resume typical prednisone dose. 114 tablet 1     sodium chloride 0.9 % neb solution Take 5 mLs by nebulization as needed for wheezing 500 mL 0     sulfamethoxazole-trimethoprim (BACTRIM) 400-80 MG tablet Take 1 tablet by mouth daily at 2 pm       sulfamethoxazole-trimethoprim (BACTRIM/SEPTRA) suspension Take 10 mg/kg/day by mouth 2 times daily Dose based on TMP component.       traZODone (DESYREL) 50 MG tablet        TRAZODONE HCL PO Take 50 mg by mouth At Bedtime          ALLERGIES:    Allergies   Allergen Reactions     Penicillins Anaphylaxis     Codeine Nausea and Vomiting     Codeine Camsylate Nausea and Vomiting     Tramadol GI Disturbance     Tramadol Hcl        NEW PMH/PSH: None    REVIEW OF SYSTEMS:  The patient completed a comprehensive 11 point review of systems (below), which was reviewed. Positives are as noted below.  Patient Supplied Answers to Review of  Systems      4/21/2025     3:39 PM    ENT ROS   Constitutional Appetite change   Gastrointestinal/Genitourinary Unexplained nausea/vomiting   Musculoskeletal Neck pain         PHYSICAL EXAM:  General: The patient was alert and conversant, and in no acute distress. Patient accompanied by her daughter.  Oral cavity/oropharynx: No masses or lesions. Edentulous. Tongue mobility and palate elevation intact and symmetric.  Neck: No palpable cervical lymphadenopathy, no significant tenderness to palpation of the thyrohyoid space, which was narrow. No obvious thyroid abnormality.  Resp: Breathing comfortably, no stridor or stertor. Audibly mild noise on rapid inhalation between spoken phrases.  Neuro: Symmetric facial function. Other cranial nerve function as documented above.  Psych: Normal affect, pleasant and cooperative.  Voice/speech: No significant dysphonia.      Procedure:   Flexible Bronchoscopy (15827)  Indications: This procedure was warranted to evaluate the patient's airway anatomy. Risks, benefits, and alternatives were discussed.  Description: After informed consent was obtained, a time-out was performed to confirm patient identity, procedure, and procedure site. Topical 3% lidocaine with 0.25% phenylephrine was applied to the nasal cavities and oropharynx. I performed the endoscopy and no complications were apparent.  Performed by: Marcelina Jordan MD MPH  Findings: Glottis patent with good bilateral vocal fold mobility. Stable infraglottic stenosis. Subglottis with stable moderate stenosis, sticky secretions, no acute granulation. Trachea clear distally. Carol sharp. Unremarkable takeoffs of mainstem bronchi.                                       IMPRESSION AND PLAN:   Susan Liu returns with an essentially stable exam; still has very sticky secretions. She has been feeling like her breathing is really good, and is being more active.    Plan:  1) continue rheum management with Dr Greene; encouraged  her to contact his office for guidance on tolerating azathioprine as she has been having nausea with taking it lately  2) consider hydration, nebulized saline for thinning secretions, which do still appear fairly sticky  3) Allergy appt is upcoming  4) Continue checking peak flows, notify our office of any drops. Again reviewed that we need to know if she is getting worse, as she tends to worry about bothering people.  5) RTC 3-4 months, or sooner as needed    I spent a total of 23 minutes on 4/21/2025 in chart review, review of tests, patient visit, documentation, care coordination, and/or discussion with other providers about the issues documented above, separate from any documented procedure(s).      Marcelina Jordan MD

## 2025-04-21 NOTE — PROGRESS NOTES
Dear Colleague:  Susan Liu recently returned for follow-up at the [facility]. My clinic note from our visit is enclosed below.  Speech recognition software may have been used in the documentation below; input is reviewed before signature to the best of my ability.     I appreciate the ongoing opportunity to participate in this patient's care.    Please feel free to contact me with any questions.      Sincerely yours,  Marcelina Jordan M.D., M.P.H.  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 She is s/p Microdirect laryngoscopy with incision, dilation, steroid injection of subglottic stenosis on 2/1/18, and previously in 2013.    She had been lost to follow up and returned in Nov 2023 with sticky mucopurulent secretions. Based on culture data showing 3+ Staph pseud/intermedius, 2+ P. aeruginosa, she was ultimately treated with ciprofloxacin and prednisone PO and ciprodex nebs. She initially had some improvement, but her symptoms worsened after she completed the antibiotics. Therefore we placed her on a longer course of antibiotics and steroids and advised her to seek timely care from her rheumatologist due to the ongoing flare. Dr Greene saw the patient 12/22/23 and recommended consideration of azathioprine; he noted that she previously did not tolerate methotrexate due to hepatic effects, and her insurance situation would likely lead to high costs with rituximab. He also recommended another prednisone burst, and completed a comprehensive screen/evaluation to assess for other contributing factors.      Ultimately she required surgery again- microDL with incision, steroid injection, balloon dilation on  6/6/24.  Saw Dr Greene 8/13/24; she was unable to get rituximab due to cost/insurance limitations so they increased azathioprine dosing, continuing Bactrim, low-dose prednisone.  Saw ID, Dr Otto, 8/21/24; Cladosporium was felt to be a contaminant; treated with levofloxacin for acute sinusitis, recommended sputum culture if symptoms persist    Today's updates:  - Allergy referral is still pending (more history in 1/27/25 note)  - Peak flows tend to be around 280  - Having difficulty tolerating the azathioprine because of nausea; has not reached out to Dr Greene's office yet  - Does feel like the azathioprine is helping with the heavy sputum in her throat  - voice, swallowing are fine  - no new PMH/PSH otherwise      MEDICATIONS:     Current Outpatient Medications   Medication Sig Dispense Refill    ADVANCED EVENING PRIMROSE OIL OR Take  by mouth.      azaTHIOprine (IMURAN) 50 MG tablet Take 50 mg by mouth      budesonide (PULMICORT) 0.25 MG/2ML neb solution Take 2 mLs (0.25 mg) by nebulization daily Use one unit dose via nebulizer daily 60 mL 6    budesonide (PULMICORT) 0.5 MG/2ML neb solution NEBULIZE 1 UNIT DOSE BY MOUTH DAILY      calcium carbonate-vitamin D 600-200 MG-UNIT TABS Take by mouth every morning       cetirizine (ZYRTEC) 10 MG tablet Take 10 mg by mouth At Bedtime       ciprofloxacin-dexAMETHasone (CIPRODEX) 0.3-0.1 % otic suspension Add 4 drops  in nebulizer. 7.5 mL 0    dextromethorphan-guaiFENesin (MUCINEX DM)  MG per 12 hr tablet Take 1 tablet by mouth as needed       doxycycline hyclate (VIBRAMYCIN) 100 MG capsule Take 100 mg by mouth daily.      EVENING PRIMROSE OIL PO Take by mouth daily (with lunch)      ipratropium - albuterol 0.5 mg/2.5 mg/3 mL (DUONEB) 0.5-2.5 (3) MG/3ML nebulization Take 1 ampule by nebulization every 6 hours as needed      levothyroxine (SYNTHROID, LEVOTHROID) 50 MCG tablet Take 50 mcg by mouth every morning       Magnesium 400 MG TABS Take 400 mg by mouth At Bedtime       Melatonin 10 MG TABS tablet Take 10 mg by mouth At Bedtime      Multiple Vitamin (DAILY MULTIVITAMIN PO) Take by mouth daily (with lunch)       omeprazole (PRILOSEC) 20 MG DR capsule TAKE 1 CAPSULE BY MOUTH DAILY 1 HOUR BEFORE A MEAL      omeprazole 20 MG tablet Take 40 mg by mouth daily (with lunch)       Lydia LC Sprint Nebulizer Set MISC See Admin Instructions      predniSONE (DELTASONE) 10 MG tablet       predniSONE (DELTASONE) 20 MG tablet Take 2.5 mg by mouth every morning       predniSONE (DELTASONE) 5 MG tablet Take (12) 5mg tablets for two days, then take (11) 5 mg tablets for two days, then take (10) 5 mg tablets for two days, then take (9) 5 mg tablets for two days, then take (8) 5 mg tablets for two days, then take (7) 5 mg tablets for two days, then take (6) 5 mg tablets for two days, then take (5) 5 mg tablets for two days, then take (4) 5 mg for two days, then take (3) 5 mg tablets for two days, then take (2) 5 mg tablets for two days. Then resume typical prednisone dose. 114 tablet 1    sodium chloride 0.9 % neb solution Take 5 mLs by nebulization as needed for wheezing 500 mL 0    sulfamethoxazole-trimethoprim (BACTRIM) 400-80 MG tablet Take 1 tablet by mouth daily at 2 pm      sulfamethoxazole-trimethoprim (BACTRIM/SEPTRA) suspension Take 10 mg/kg/day by mouth 2 times daily Dose based on TMP component.      traZODone (DESYREL) 50 MG tablet       TRAZODONE HCL PO Take 50 mg by mouth At Bedtime          ALLERGIES:    Allergies   Allergen Reactions    Penicillins Anaphylaxis    Codeine Nausea and Vomiting    Codeine Camsylate Nausea and Vomiting    Tramadol GI Disturbance    Tramadol Hcl        NEW PMH/PSH: None    REVIEW OF SYSTEMS:  The patient completed a comprehensive 11 point review of systems (below), which was reviewed. Positives are as noted below.  Patient Supplied Answers to Review of Systems      4/21/2025     3:39 PM   [facility] ENT ROS   Constitutional Appetite change    Gastrointestinal/Genitourinary Unexplained nausea/vomiting   Musculoskeletal Neck pain         PHYSICAL EXAM:  General: The patient was alert and conversant, and in no acute distress. Patient accompanied by her daughter.  Oral cavity/oropharynx: No masses or lesions. Edentulous. Tongue mobility and palate elevation intact and symmetric.  Neck: No palpable cervical lymphadenopathy, no significant tenderness to palpation of the thyrohyoid space, which was narrow. No obvious thyroid abnormality.  Resp: Breathing comfortably, no stridor or stertor. Audibly mild noise on rapid inhalation between spoken phrases.  Neuro: Symmetric facial function. Other cranial nerve function as documented above.  Psych: Normal affect, pleasant and cooperative.  Voice/speech: No significant dysphonia.      Procedure:   Flexible Bronchoscopy (27167)  Indications: This procedure was warranted to evaluate the patient's airway anatomy. Risks, benefits, and alternatives were discussed.  Description: After informed consent was obtained, a time-out was performed to confirm patient identity, procedure, and procedure site. Topical 3% lidocaine with 0.25% phenylephrine was applied to the nasal cavities and oropharynx. I performed the endoscopy and no complications were apparent.  Performed by: Marcelina Jordan MD MPH  Findings: Glottis patent with good bilateral vocal fold mobility. Stable infraglottic stenosis. Subglottis with stable moderate stenosis, sticky secretions, no acute granulation. Trachea clear distally. Carol sharp. Unremarkable takeoffs of mainstem bronchi.                                       IMPRESSION AND PLAN:   Susan Liu returns with an essentially stable exam; still has very sticky secretions. She has been feeling like her breathing is really good, and is being more active.    Plan:  1) continue rheum management with Dr Greene; encouraged her to contact his office for guidance on tolerating azathioprine as she has been  having nausea with taking it lately  2) consider hydration, nebulized saline for thinning secretions, which do still appear fairly sticky  3) Allergy appt is upcoming  4) Continue checking peak flows, notify our office of any drops. Again reviewed that we need to know if she is getting worse, as she tends to worry about bothering people.  5) RTC 3-4 months, or sooner as needed    I spent a total of 23 minutes on 4/21/2025 in chart review, review of tests, patient visit, documentation, care coordination, and/or discussion with other providers about the issues documented above, separate from any documented procedure(s).

## 2025-04-21 NOTE — PATIENT INSTRUCTIONS
1.  You were seen in the ENT Clinic today by . If you have any questions or concerns after your appointment, please call [phone]. Press option #1 for scheduling related needs. Press option #3 for Nurse advice.    2.   has recommended the following:   - continue checking peak flows   - consider hydration and nebulized saline to manage secretions   - continue rheumatology management    3.  Plan is to return to clinic in 3-4 months      Katie Mckeon LPN  [phone]  [facility] - Otolaryngology

## 2025-04-21 NOTE — NURSING NOTE
"Chief Complaint   Patient presents with    RECHECK   Blood pressure (!) 157/94, pulse 86, height 1.575 m (5' 2\"), weight 64 kg (141 lb 1.6 oz), SpO2 99%. Salo Boyd, EMT    "

## 2025-05-13 ENCOUNTER — RESULTS FOLLOW-UP (OUTPATIENT)
Dept: ALLERGY | Facility: CLINIC | Age: 75
End: 2025-05-13

## 2025-05-27 ENCOUNTER — MYC MEDICAL ADVICE (OUTPATIENT)
Dept: OTOLARYNGOLOGY | Facility: CLINIC | Age: 75
End: 2025-05-27
Payer: COMMERCIAL

## 2025-06-15 ENCOUNTER — HEALTH MAINTENANCE LETTER (OUTPATIENT)
Age: 75
End: 2025-06-15

## (undated) DEVICE — SPONGE COTTONOID 1/4X1/4" 80-1399

## (undated) DEVICE — SPONGE COTTONOID 1/2X1/2" 80-1400

## (undated) DEVICE — WIPE INSTRUMENT MEROCEL 400200

## (undated) DEVICE — LINEN TOWEL PACK X5 5464

## (undated) DEVICE — GOWN SM/MED DISP 9505

## (undated) DEVICE — DILATOR CRE PULM BALLOON 12-13.5-15MMX75CM 3CM WIRE 5035

## (undated) DEVICE — GLOVE PROTEXIS POWDER FREE SMT 6.5  2D72PT65X

## (undated) DEVICE — SOL WATER IRRIG 1000ML BOTTLE 2F7114

## (undated) DEVICE — JELLY LUBRICATING SURGILUBE 2OZ TUBE

## (undated) DEVICE — GOWN LG DISP 9515

## (undated) DEVICE — ESU GROUND PAD ADULT W/CORD E7507

## (undated) DEVICE — SYR PISTON URETHRAL 60ML 68000

## (undated) DEVICE — SOL NACL 0.9% IRRIG 1000ML BOTTLE 2F7124

## (undated) DEVICE — SOL NACL 0.9% INJ 1000ML BAG 2B1324X

## (undated) DEVICE — PACK ENT ENDOSCOPY UMMC

## (undated) DEVICE — ENDO FORCEP ALLIGATOR JAW BIOPSY 2MMX100CM FB-211D

## (undated) DEVICE — GLOVE BIOGEL PI ULTRATOUCH G SZ 6.5 42165

## (undated) DEVICE — NDL BUTTERFLY 25GA .75" 367298

## (undated) DEVICE — DRAPE SLEEVE 599

## (undated) DEVICE — SPONGE COTTONOID 1/2X1 1/2" 1-STRING 80-1404

## (undated) DEVICE — TEST TUBE W/SCREW CAP 17361

## (undated) DEVICE — ENDO TOOTH QUICK GUARD CONFORMING PROTECTION

## (undated) DEVICE — TUBING SMOKE EVAC .635CMX3M SEA3705

## (undated) DEVICE — DRSG EYE PAD STERILE 1.63X2.63" NON21600

## (undated) DEVICE — SPECIMEN SWAB CALGISWAB TYPE 1 25-800

## (undated) DEVICE — BLADE SKIMMER LARYN 2.9X22CM  1882923HRE

## (undated) DEVICE — SUCTION MANIFOLD DORNOCH ULTRA CART UL-CL500

## (undated) DEVICE — SYR DILATION COOK 60 ML DS-60CC-S  G27112

## (undated) DEVICE — DRSG TELFA 3"X8" NON25720

## (undated) DEVICE — SUCTION MANIFOLD NEPTUNE 2 SYS 4 PORT 0702-020-000

## (undated) DEVICE — EYE DRSG PAD OVAL

## (undated) DEVICE — Device

## (undated) DEVICE — LABEL MEDICATION SYSTEM 3303-P

## (undated) RX ORDER — LIDOCAINE HYDROCHLORIDE 20 MG/ML
INJECTION, SOLUTION INFILTRATION; PERINEURAL
Status: DISPENSED
Start: 2023-11-13

## (undated) RX ORDER — FENTANYL CITRATE 50 UG/ML
INJECTION, SOLUTION INTRAMUSCULAR; INTRAVENOUS
Status: DISPENSED
Start: 2018-02-01

## (undated) RX ORDER — ONDANSETRON 2 MG/ML
INJECTION INTRAMUSCULAR; INTRAVENOUS
Status: DISPENSED
Start: 2018-02-01

## (undated) RX ORDER — LIDOCAINE HYDROCHLORIDE 20 MG/ML
INJECTION, SOLUTION EPIDURAL; INFILTRATION; INTRACAUDAL; PERINEURAL
Status: DISPENSED
Start: 2018-02-01

## (undated) RX ORDER — FENTANYL CITRATE-0.9 % NACL/PF 10 MCG/ML
PLASTIC BAG, INJECTION (ML) INTRAVENOUS
Status: DISPENSED
Start: 2024-06-06

## (undated) RX ORDER — TRIAMCINOLONE ACETONIDE 40 MG/ML
INJECTION, SUSPENSION INTRA-ARTICULAR; INTRAMUSCULAR
Status: DISPENSED
Start: 2018-04-23

## (undated) RX ORDER — PROPOFOL 10 MG/ML
INJECTION, EMULSION INTRAVENOUS
Status: DISPENSED
Start: 2024-06-06

## (undated) RX ORDER — CLINDAMYCIN PHOSPHATE 900 MG/50ML
INJECTION, SOLUTION INTRAVENOUS
Status: DISPENSED
Start: 2018-02-01

## (undated) RX ORDER — CLINDAMYCIN PHOSPHATE 900 MG/50ML
INJECTION, SOLUTION INTRAVENOUS
Status: DISPENSED
Start: 2024-06-06

## (undated) RX ORDER — LIDOCAINE HYDROCHLORIDE 20 MG/ML
INJECTION, SOLUTION INFILTRATION; PERINEURAL
Status: DISPENSED
Start: 2018-04-23

## (undated) RX ORDER — DEXAMETHASONE SODIUM PHOSPHATE 4 MG/ML
INJECTION, SOLUTION INTRA-ARTICULAR; INTRALESIONAL; INTRAMUSCULAR; INTRAVENOUS; SOFT TISSUE
Status: DISPENSED
Start: 2018-02-01

## (undated) RX ORDER — LIDOCAINE HYDROCHLORIDE 20 MG/ML
INJECTION, SOLUTION INFILTRATION; PERINEURAL
Status: DISPENSED
Start: 2018-03-09

## (undated) RX ORDER — FENTANYL CITRATE 50 UG/ML
INJECTION, SOLUTION INTRAMUSCULAR; INTRAVENOUS
Status: DISPENSED
Start: 2024-06-06

## (undated) RX ORDER — ONDANSETRON 2 MG/ML
INJECTION INTRAMUSCULAR; INTRAVENOUS
Status: DISPENSED
Start: 2024-06-06

## (undated) RX ORDER — TRIAMCINOLONE ACETONIDE 40 MG/ML
INJECTION, SUSPENSION INTRA-ARTICULAR; INTRAMUSCULAR
Status: DISPENSED
Start: 2018-03-09